# Patient Record
Sex: MALE | Race: WHITE | NOT HISPANIC OR LATINO | Employment: OTHER | URBAN - METROPOLITAN AREA
[De-identification: names, ages, dates, MRNs, and addresses within clinical notes are randomized per-mention and may not be internally consistent; named-entity substitution may affect disease eponyms.]

---

## 2023-10-02 ENCOUNTER — HOSPITAL ENCOUNTER (INPATIENT)
Facility: HOSPITAL | Age: 73
LOS: 11 days | Discharge: NURSING, REHAB OR LONG-TERM CARE | DRG: 305 | End: 2023-10-13
Attending: EMERGENCY MEDICINE | Admitting: INTERNAL MEDICINE
Payer: MEDICARE

## 2023-10-02 ENCOUNTER — APPOINTMENT (OUTPATIENT)
Dept: RADIOLOGY | Facility: HOSPITAL | Age: 73
DRG: 305 | End: 2023-10-02
Payer: MEDICARE

## 2023-10-02 DIAGNOSIS — Z95.0 PACEMAKER: ICD-10-CM

## 2023-10-02 DIAGNOSIS — G91.9 HYDROCEPHALUS IN ADULT (MULTI): ICD-10-CM

## 2023-10-02 DIAGNOSIS — I43 CARDIOMYOPATHY AS MANIFESTATION OF UNDERLYING DISEASE (MULTI): ICD-10-CM

## 2023-10-02 DIAGNOSIS — I10 HYPERTENSION, UNSPECIFIED TYPE: ICD-10-CM

## 2023-10-02 DIAGNOSIS — I11.9 HYPERTENSIVE HEART DISEASE WITHOUT HEART FAILURE: ICD-10-CM

## 2023-10-02 DIAGNOSIS — I16.1 HYPERTENSIVE EMERGENCY: Primary | ICD-10-CM

## 2023-10-02 DIAGNOSIS — R55 SYNCOPE, UNSPECIFIED SYNCOPE TYPE: ICD-10-CM

## 2023-10-02 DIAGNOSIS — E11.65 TYPE 2 DIABETES MELLITUS WITH HYPERGLYCEMIA, WITHOUT LONG-TERM CURRENT USE OF INSULIN (MULTI): ICD-10-CM

## 2023-10-02 DIAGNOSIS — I49.5 TACHYCARDIA-BRADYCARDIA (MULTI): ICD-10-CM

## 2023-10-02 DIAGNOSIS — I21.A1 TYPE 2 MI (MYOCARDIAL INFARCTION) (MULTI): ICD-10-CM

## 2023-10-02 DIAGNOSIS — G91.2 NPH (NORMAL PRESSURE HYDROCEPHALUS) (MULTI): ICD-10-CM

## 2023-10-02 LAB
ALBUMIN SERPL BCP-MCNC: 3.9 G/DL (ref 3.4–5)
ALBUMIN SERPL BCP-MCNC: 4.2 G/DL (ref 3.4–5)
ALP SERPL-CCNC: 66 U/L (ref 33–136)
ALP SERPL-CCNC: 67 U/L (ref 33–136)
ALT SERPL W P-5'-P-CCNC: 16 U/L (ref 10–52)
ALT SERPL W P-5'-P-CCNC: 18 U/L (ref 10–52)
AMMONIA PLAS-SCNC: 30 UMOL/L (ref 16–53)
ANION GAP BLDV CALCULATED.4IONS-SCNC: 12 MMOL/L (ref 10–25)
ANION GAP BLDV CALCULATED.4IONS-SCNC: 13 MMOL/L (ref 10–25)
ANION GAP SERPL CALC-SCNC: 20 MMOL/L (ref 10–20)
ANION GAP SERPL CALC-SCNC: 20 MMOL/L (ref 10–20)
APPEARANCE UR: ABNORMAL
APTT PPP: 29 SECONDS (ref 27–38)
AST SERPL W P-5'-P-CCNC: 24 U/L (ref 9–39)
AST SERPL W P-5'-P-CCNC: 27 U/L (ref 9–39)
BASE EXCESS BLDV CALC-SCNC: 2 MMOL/L (ref -2–3)
BASE EXCESS BLDV CALC-SCNC: 5.2 MMOL/L (ref -2–3)
BASOPHILS # BLD AUTO: 0.02 X10*3/UL (ref 0–0.1)
BASOPHILS # BLD AUTO: 0.04 X10*3/UL (ref 0–0.1)
BASOPHILS NFR BLD AUTO: 0.1 %
BASOPHILS NFR BLD AUTO: 0.3 %
BILIRUB SERPL-MCNC: 1.5 MG/DL (ref 0–1.2)
BILIRUB SERPL-MCNC: 1.7 MG/DL (ref 0–1.2)
BILIRUB UR STRIP.AUTO-MCNC: NEGATIVE MG/DL
BNP SERPL-MCNC: 229 PG/ML (ref 0–99)
BNP SERPL-MCNC: 244 PG/ML (ref 0–99)
BODY TEMPERATURE: 37 DEGREES CELSIUS
BODY TEMPERATURE: 37 DEGREES CELSIUS
BUN SERPL-MCNC: 20 MG/DL (ref 6–23)
BUN SERPL-MCNC: 23 MG/DL (ref 6–23)
CA-I BLD-SCNC: 1.12 MMOL/L (ref 1.1–1.33)
CA-I BLDV-SCNC: 1.21 MMOL/L (ref 1.1–1.33)
CA-I BLDV-SCNC: 1.22 MMOL/L (ref 1.1–1.33)
CALCIUM SERPL-MCNC: 10.1 MG/DL (ref 8.6–10.6)
CALCIUM SERPL-MCNC: 9.7 MG/DL (ref 8.6–10.6)
CARDIAC TROPONIN I PNL SERPL HS: 149 NG/L (ref 0–53)
CARDIAC TROPONIN I PNL SERPL HS: 151 NG/L (ref 0–53)
CARDIAC TROPONIN I PNL SERPL HS: 153 NG/L (ref 0–53)
CHLORIDE BLDV-SCNC: 100 MMOL/L (ref 98–107)
CHLORIDE BLDV-SCNC: 99 MMOL/L (ref 98–107)
CHLORIDE SERPL-SCNC: 97 MMOL/L (ref 98–107)
CHLORIDE SERPL-SCNC: 98 MMOL/L (ref 98–107)
CK SERPL-CCNC: 396 U/L (ref 0–325)
CO2 SERPL-SCNC: 25 MMOL/L (ref 21–32)
CO2 SERPL-SCNC: 26 MMOL/L (ref 21–32)
COLOR UR: YELLOW
CREAT SERPL-MCNC: 0.98 MG/DL (ref 0.5–1.3)
CREAT SERPL-MCNC: 1.04 MG/DL (ref 0.5–1.3)
EOSINOPHIL # BLD AUTO: 0.01 X10*3/UL (ref 0–0.4)
EOSINOPHIL # BLD AUTO: 0.06 X10*3/UL (ref 0–0.4)
EOSINOPHIL NFR BLD AUTO: 0.1 %
EOSINOPHIL NFR BLD AUTO: 0.4 %
ERYTHROCYTE [DISTWIDTH] IN BLOOD BY AUTOMATED COUNT: 12.6 % (ref 11.5–14.5)
ERYTHROCYTE [DISTWIDTH] IN BLOOD BY AUTOMATED COUNT: 12.7 % (ref 11.5–14.5)
GFR SERPL CREATININE-BSD FRML MDRD: 76 ML/MIN/1.73M*2
GFR SERPL CREATININE-BSD FRML MDRD: 81 ML/MIN/1.73M*2
GLUCOSE BLD MANUAL STRIP-MCNC: 304 MG/DL (ref 74–99)
GLUCOSE BLD MANUAL STRIP-MCNC: 313 MG/DL (ref 74–99)
GLUCOSE BLD MANUAL STRIP-MCNC: 321 MG/DL (ref 74–99)
GLUCOSE BLDV-MCNC: 355 MG/DL (ref 74–99)
GLUCOSE BLDV-MCNC: 420 MG/DL (ref 74–99)
GLUCOSE SERPL-MCNC: 311 MG/DL (ref 74–99)
GLUCOSE SERPL-MCNC: 359 MG/DL (ref 74–99)
GLUCOSE UR STRIP.AUTO-MCNC: ABNORMAL MG/DL
HCO3 BLDV-SCNC: 26.5 MMOL/L (ref 22–26)
HCO3 BLDV-SCNC: 27.8 MMOL/L (ref 22–26)
HCT VFR BLD AUTO: 46.8 % (ref 41–52)
HCT VFR BLD AUTO: 50.7 % (ref 41–52)
HCT VFR BLD EST: 54 % (ref 41–52)
HCT VFR BLD EST: 55 % (ref 41–52)
HGB BLD-MCNC: 17.5 G/DL (ref 13.5–17.5)
HGB BLD-MCNC: 17.8 G/DL (ref 13.5–17.5)
HGB BLDV-MCNC: 18.1 G/DL (ref 13.5–17.5)
HGB BLDV-MCNC: 18.4 G/DL (ref 13.5–17.5)
IMM GRANULOCYTES # BLD AUTO: 0.08 X10*3/UL (ref 0–0.5)
IMM GRANULOCYTES # BLD AUTO: 0.08 X10*3/UL (ref 0–0.5)
IMM GRANULOCYTES NFR BLD AUTO: 0.5 % (ref 0–0.9)
IMM GRANULOCYTES NFR BLD AUTO: 0.5 % (ref 0–0.9)
INR PPP: 1 (ref 0.9–1.1)
KETONES UR STRIP.AUTO-MCNC: ABNORMAL MG/DL
LACTATE BLDV-SCNC: 2.2 MMOL/L (ref 0.4–2)
LACTATE BLDV-SCNC: 2.3 MMOL/L (ref 0.4–2)
LACTATE SERPL-SCNC: 1.3 MMOL/L (ref 0.4–2)
LEUKOCYTE ESTERASE UR QL STRIP.AUTO: NEGATIVE
LYMPHOCYTES # BLD AUTO: 0.79 X10*3/UL (ref 0.8–3)
LYMPHOCYTES # BLD AUTO: 1.67 X10*3/UL (ref 0.8–3)
LYMPHOCYTES NFR BLD AUTO: 11.1 %
LYMPHOCYTES NFR BLD AUTO: 5.3 %
MAGNESIUM SERPL-MCNC: 1.85 MG/DL (ref 1.6–2.4)
MCH RBC QN AUTO: 32.1 PG (ref 26–34)
MCH RBC QN AUTO: 33.7 PG (ref 26–34)
MCHC RBC AUTO-ENTMCNC: 35.1 G/DL (ref 32–36)
MCHC RBC AUTO-ENTMCNC: 37.4 G/DL (ref 32–36)
MCV RBC AUTO: 90 FL (ref 80–100)
MCV RBC AUTO: 91 FL (ref 80–100)
MONOCYTES # BLD AUTO: 1.07 X10*3/UL (ref 0.05–0.8)
MONOCYTES # BLD AUTO: 1.47 X10*3/UL (ref 0.05–0.8)
MONOCYTES NFR BLD AUTO: 7.1 %
MONOCYTES NFR BLD AUTO: 9.8 %
MUCOUS THREADS #/AREA URNS AUTO: ABNORMAL /LPF
NEUTROPHILS # BLD AUTO: 11.7 X10*3/UL (ref 1.6–5.5)
NEUTROPHILS # BLD AUTO: 13 X10*3/UL (ref 1.6–5.5)
NEUTROPHILS NFR BLD AUTO: 77.9 %
NEUTROPHILS NFR BLD AUTO: 86.9 %
NITRITE UR QL STRIP.AUTO: NEGATIVE
NRBC BLD-RTO: 0 /100 WBCS (ref 0–0)
NRBC BLD-RTO: 0 /100 WBCS (ref 0–0)
OXYHGB MFR BLDV: 76.8 % (ref 45–75)
OXYHGB MFR BLDV: 85.2 % (ref 45–75)
PCO2 BLDV: 34 MM HG (ref 41–51)
PCO2 BLDV: 40 MM HG (ref 41–51)
PH BLDV: 7.43 PH (ref 7.33–7.43)
PH BLDV: 7.52 PH (ref 7.33–7.43)
PH UR STRIP.AUTO: 5 [PH]
PHOSPHATE SERPL-MCNC: 3.1 MG/DL (ref 2.5–4.9)
PLATELET # BLD AUTO: 207 X10*3/UL (ref 150–450)
PLATELET # BLD AUTO: 227 X10*3/UL (ref 150–450)
PMV BLD AUTO: 11 FL (ref 7.5–11.5)
PMV BLD AUTO: 11.3 FL (ref 7.5–11.5)
PO2 BLDV: 52 MM HG (ref 35–45)
PO2 BLDV: 54 MM HG (ref 35–45)
POTASSIUM BLDV-SCNC: 3.5 MMOL/L (ref 3.5–5.3)
POTASSIUM BLDV-SCNC: 4 MMOL/L (ref 3.5–5.3)
POTASSIUM SERPL-SCNC: 3.4 MMOL/L (ref 3.5–5.3)
POTASSIUM SERPL-SCNC: 3.8 MMOL/L (ref 3.5–5.3)
PROT SERPL-MCNC: 6.7 G/DL (ref 6.4–8.2)
PROT SERPL-MCNC: 7.6 G/DL (ref 6.4–8.2)
PROT UR STRIP.AUTO-MCNC: ABNORMAL MG/DL
PROTHROMBIN TIME: 11.5 SECONDS (ref 9.8–12.8)
RBC # BLD AUTO: 5.19 X10*6/UL (ref 4.5–5.9)
RBC # BLD AUTO: 5.55 X10*6/UL (ref 4.5–5.9)
RBC # UR STRIP.AUTO: NEGATIVE /UL
RBC #/AREA URNS AUTO: ABNORMAL /HPF
SAO2 % BLDV: 79 % (ref 45–75)
SAO2 % BLDV: 88 % (ref 45–75)
SODIUM BLDV-SCNC: 134 MMOL/L (ref 136–145)
SODIUM BLDV-SCNC: 136 MMOL/L (ref 136–145)
SODIUM SERPL-SCNC: 139 MMOL/L (ref 136–145)
SODIUM SERPL-SCNC: 140 MMOL/L (ref 136–145)
SP GR UR STRIP.AUTO: 1.03
SQUAMOUS #/AREA URNS AUTO: ABNORMAL /HPF
UROBILINOGEN UR STRIP.AUTO-MCNC: <2 MG/DL
WBC # BLD AUTO: 15 X10*3/UL (ref 4.4–11.3)
WBC # BLD AUTO: 15 X10*3/UL (ref 4.4–11.3)
WBC #/AREA URNS AUTO: ABNORMAL /HPF

## 2023-10-02 PROCEDURE — 96372 THER/PROPH/DIAG INJ SC/IM: CPT

## 2023-10-02 PROCEDURE — 82330 ASSAY OF CALCIUM: CPT

## 2023-10-02 PROCEDURE — 36415 COLL VENOUS BLD VENIPUNCTURE: CPT

## 2023-10-02 PROCEDURE — 82947 ASSAY GLUCOSE BLOOD QUANT: CPT

## 2023-10-02 PROCEDURE — 99285 EMERGENCY DEPT VISIT HI MDM: CPT | Performed by: EMERGENCY MEDICINE

## 2023-10-02 PROCEDURE — 2500000004 HC RX 250 GENERAL PHARMACY W/ HCPCS (ALT 636 FOR OP/ED)

## 2023-10-02 PROCEDURE — 83880 ASSAY OF NATRIURETIC PEPTIDE: CPT | Performed by: EMERGENCY MEDICINE

## 2023-10-02 PROCEDURE — 84484 ASSAY OF TROPONIN QUANT: CPT | Performed by: EMERGENCY MEDICINE

## 2023-10-02 PROCEDURE — G1004 CDSM NDSC: HCPCS

## 2023-10-02 PROCEDURE — 83735 ASSAY OF MAGNESIUM: CPT

## 2023-10-02 PROCEDURE — 70450 CT HEAD/BRAIN W/O DYE: CPT | Performed by: RADIOLOGY

## 2023-10-02 PROCEDURE — 82550 ASSAY OF CK (CPK): CPT | Performed by: EMERGENCY MEDICINE

## 2023-10-02 PROCEDURE — 84100 ASSAY OF PHOSPHORUS: CPT

## 2023-10-02 PROCEDURE — 84484 ASSAY OF TROPONIN QUANT: CPT

## 2023-10-02 PROCEDURE — 80307 DRUG TEST PRSMV CHEM ANLYZR: CPT

## 2023-10-02 PROCEDURE — 83605 ASSAY OF LACTIC ACID: CPT

## 2023-10-02 PROCEDURE — 82140 ASSAY OF AMMONIA: CPT | Performed by: EMERGENCY MEDICINE

## 2023-10-02 PROCEDURE — 99291 CRITICAL CARE FIRST HOUR: CPT

## 2023-10-02 PROCEDURE — 84439 ASSAY OF FREE THYROXINE: CPT

## 2023-10-02 PROCEDURE — 84443 ASSAY THYROID STIM HORMONE: CPT

## 2023-10-02 PROCEDURE — 85730 THROMBOPLASTIN TIME PARTIAL: CPT | Performed by: STUDENT IN AN ORGANIZED HEALTH CARE EDUCATION/TRAINING PROGRAM

## 2023-10-02 PROCEDURE — 1100000001 HC PRIVATE ROOM DAILY

## 2023-10-02 PROCEDURE — 2500000004 HC RX 250 GENERAL PHARMACY W/ HCPCS (ALT 636 FOR OP/ED): Performed by: EMERGENCY MEDICINE

## 2023-10-02 PROCEDURE — 85025 COMPLETE CBC W/AUTO DIFF WBC: CPT

## 2023-10-02 PROCEDURE — 2500000002 HC RX 250 W HCPCS SELF ADMINISTERED DRUGS (ALT 637 FOR MEDICARE OP, ALT 636 FOR OP/ED)

## 2023-10-02 PROCEDURE — 84484 ASSAY OF TROPONIN QUANT: CPT | Performed by: STUDENT IN AN ORGANIZED HEALTH CARE EDUCATION/TRAINING PROGRAM

## 2023-10-02 PROCEDURE — 80053 COMPREHEN METABOLIC PANEL: CPT | Performed by: EMERGENCY MEDICINE

## 2023-10-02 PROCEDURE — 83605 ASSAY OF LACTIC ACID: CPT | Performed by: STUDENT IN AN ORGANIZED HEALTH CARE EDUCATION/TRAINING PROGRAM

## 2023-10-02 PROCEDURE — 2580000001 HC RX 258 IV SOLUTIONS

## 2023-10-02 PROCEDURE — 80307 DRUG TEST PRSMV CHEM ANLYZR: CPT | Performed by: EMERGENCY MEDICINE

## 2023-10-02 PROCEDURE — 96374 THER/PROPH/DIAG INJ IV PUSH: CPT

## 2023-10-02 PROCEDURE — 81001 URINALYSIS AUTO W/SCOPE: CPT | Performed by: EMERGENCY MEDICINE

## 2023-10-02 PROCEDURE — 36415 COLL VENOUS BLD VENIPUNCTURE: CPT | Performed by: EMERGENCY MEDICINE

## 2023-10-02 PROCEDURE — 71045 X-RAY EXAM CHEST 1 VIEW: CPT | Mod: FOREIGN READ | Performed by: RADIOLOGY

## 2023-10-02 PROCEDURE — 72125 CT NECK SPINE W/O DYE: CPT | Mod: ME

## 2023-10-02 PROCEDURE — 85018 HEMOGLOBIN: CPT | Performed by: EMERGENCY MEDICINE

## 2023-10-02 PROCEDURE — 87086 URINE CULTURE/COLONY COUNT: CPT | Performed by: EMERGENCY MEDICINE

## 2023-10-02 PROCEDURE — 72125 CT NECK SPINE W/O DYE: CPT | Performed by: RADIOLOGY

## 2023-10-02 PROCEDURE — 83880 ASSAY OF NATRIURETIC PEPTIDE: CPT

## 2023-10-02 PROCEDURE — 71045 X-RAY EXAM CHEST 1 VIEW: CPT | Mod: FY,FR

## 2023-10-02 PROCEDURE — 85025 COMPLETE CBC W/AUTO DIFF WBC: CPT | Performed by: EMERGENCY MEDICINE

## 2023-10-02 PROCEDURE — 96375 TX/PRO/DX INJ NEW DRUG ADDON: CPT

## 2023-10-02 PROCEDURE — 84075 ASSAY ALKALINE PHOSPHATASE: CPT | Performed by: EMERGENCY MEDICINE

## 2023-10-02 RX ORDER — SODIUM CHLORIDE, SODIUM LACTATE, POTASSIUM CHLORIDE, CALCIUM CHLORIDE 600; 310; 30; 20 MG/100ML; MG/100ML; MG/100ML; MG/100ML
500 INJECTION, SOLUTION INTRAVENOUS CONTINUOUS
Status: ACTIVE | OUTPATIENT
Start: 2023-10-02 | End: 2023-10-03

## 2023-10-02 RX ORDER — POTASSIUM CHLORIDE 14.9 MG/ML
20 INJECTION INTRAVENOUS
Status: DISCONTINUED | OUTPATIENT
Start: 2023-10-02 | End: 2023-10-02

## 2023-10-02 RX ORDER — INSULIN GLARGINE 100 [IU]/ML
10 INJECTION, SOLUTION SUBCUTANEOUS NIGHTLY
Status: DISCONTINUED | OUTPATIENT
Start: 2023-10-02 | End: 2023-10-04

## 2023-10-02 RX ORDER — MAGNESIUM SULFATE HEPTAHYDRATE 40 MG/ML
2 INJECTION, SOLUTION INTRAVENOUS ONCE
Status: DISCONTINUED | OUTPATIENT
Start: 2023-10-02 | End: 2023-10-02

## 2023-10-02 RX ORDER — HYDRALAZINE HYDROCHLORIDE 20 MG/ML
10 INJECTION INTRAMUSCULAR; INTRAVENOUS ONCE
Status: COMPLETED | OUTPATIENT
Start: 2023-10-02 | End: 2023-10-02

## 2023-10-02 RX ORDER — DEXTROSE MONOHYDRATE 100 MG/ML
0.3 INJECTION, SOLUTION INTRAVENOUS ONCE AS NEEDED
Status: DISCONTINUED | OUTPATIENT
Start: 2023-10-02 | End: 2023-10-13 | Stop reason: HOSPADM

## 2023-10-02 RX ORDER — POTASSIUM CHLORIDE 14.9 MG/ML
20 INJECTION INTRAVENOUS
Status: COMPLETED | OUTPATIENT
Start: 2023-10-02 | End: 2023-10-03

## 2023-10-02 RX ORDER — DEXTROSE 50 % IN WATER (D50W) INTRAVENOUS SYRINGE
25
Status: DISCONTINUED | OUTPATIENT
Start: 2023-10-02 | End: 2023-10-13 | Stop reason: HOSPADM

## 2023-10-02 RX ORDER — NICARDIPINE HYDROCHLORIDE 0.2 MG/ML
2.5-15 INJECTION INTRAVENOUS CONTINUOUS
Status: DISCONTINUED | OUTPATIENT
Start: 2023-10-02 | End: 2023-10-03

## 2023-10-02 RX ORDER — MAGNESIUM SULFATE HEPTAHYDRATE 40 MG/ML
2 INJECTION, SOLUTION INTRAVENOUS ONCE
Status: COMPLETED | OUTPATIENT
Start: 2023-10-02 | End: 2023-10-02

## 2023-10-02 RX ORDER — POTASSIUM CHLORIDE 14.9 MG/ML
INJECTION INTRAVENOUS
Status: COMPLETED
Start: 2023-10-02 | End: 2023-10-02

## 2023-10-02 RX ORDER — NICARDIPINE HYDROCHLORIDE 0.2 MG/ML
INJECTION INTRAVENOUS
Status: COMPLETED
Start: 2023-10-02 | End: 2023-10-02

## 2023-10-02 RX ORDER — INSULIN LISPRO 100 [IU]/ML
0-10 INJECTION, SOLUTION INTRAVENOUS; SUBCUTANEOUS
Status: DISCONTINUED | OUTPATIENT
Start: 2023-10-02 | End: 2023-10-13 | Stop reason: HOSPADM

## 2023-10-02 RX ORDER — INSULIN LISPRO 100 [IU]/ML
0-10 INJECTION, SOLUTION INTRAVENOUS; SUBCUTANEOUS
Status: DISCONTINUED | OUTPATIENT
Start: 2023-10-03 | End: 2023-10-02

## 2023-10-02 RX ORDER — NAPROXEN SODIUM 220 MG/1
325 TABLET, FILM COATED ORAL ONCE
Status: DISCONTINUED | OUTPATIENT
Start: 2023-10-02 | End: 2023-10-02

## 2023-10-02 RX ORDER — DEXTROSE MONOHYDRATE 100 MG/ML
0.3 INJECTION, SOLUTION INTRAVENOUS ONCE AS NEEDED
Status: DISCONTINUED | OUTPATIENT
Start: 2023-10-02 | End: 2023-10-02

## 2023-10-02 RX ORDER — NAPROXEN SODIUM 220 MG/1
324 TABLET, FILM COATED ORAL ONCE
Status: DISCONTINUED | OUTPATIENT
Start: 2023-10-02 | End: 2023-10-02

## 2023-10-02 RX ADMIN — HYDRALAZINE HYDROCHLORIDE 10 MG: 20 INJECTION INTRAMUSCULAR; INTRAVENOUS at 10:48

## 2023-10-02 RX ADMIN — NICARDIPINE HYDROCHLORIDE 2.5 MG/HR: 0.2 INJECTION, SOLUTION INTRAVENOUS at 12:09

## 2023-10-02 RX ADMIN — INSULIN LISPRO 8 UNITS: 100 INJECTION, SOLUTION INTRAVENOUS; SUBCUTANEOUS at 22:06

## 2023-10-02 RX ADMIN — POTASSIUM CHLORIDE 20 MEQ: 14.9 INJECTION, SOLUTION INTRAVENOUS at 22:08

## 2023-10-02 RX ADMIN — NICARDIPINE HYDROCHLORIDE 2.58 MG/HR: 0.2 INJECTION, SOLUTION INTRAVENOUS at 22:07

## 2023-10-02 RX ADMIN — MAGNESIUM SULFATE HEPTAHYDRATE 2 G: 40 INJECTION, SOLUTION INTRAVENOUS at 22:07

## 2023-10-02 RX ADMIN — INSULIN HUMAN 10 UNITS: 100 INJECTION, SOLUTION PARENTERAL at 12:00

## 2023-10-02 RX ADMIN — SODIUM CHLORIDE, POTASSIUM CHLORIDE, SODIUM LACTATE AND CALCIUM CHLORIDE 500 ML/HR: 600; 310; 30; 20 INJECTION, SOLUTION INTRAVENOUS at 23:17

## 2023-10-02 RX ADMIN — INSULIN GLARGINE 10 UNITS: 100 INJECTION, SOLUTION SUBCUTANEOUS at 22:05

## 2023-10-02 SDOH — SOCIAL STABILITY: SOCIAL INSECURITY: ABUSE: ADULT

## 2023-10-02 SDOH — SOCIAL STABILITY: SOCIAL INSECURITY: ARE THERE ANY APPARENT SIGNS OF INJURIES/BEHAVIORS THAT COULD BE RELATED TO ABUSE/NEGLECT?: NO

## 2023-10-02 SDOH — SOCIAL STABILITY: SOCIAL INSECURITY: DO YOU FEEL UNSAFE GOING BACK TO THE PLACE WHERE YOU ARE LIVING?: NO

## 2023-10-02 SDOH — SOCIAL STABILITY: SOCIAL INSECURITY: DOES ANYONE TRY TO KEEP YOU FROM HAVING/CONTACTING OTHER FRIENDS OR DOING THINGS OUTSIDE YOUR HOME?: NO

## 2023-10-02 SDOH — SOCIAL STABILITY: SOCIAL INSECURITY: HAVE YOU HAD THOUGHTS OF HARMING ANYONE ELSE?: NO

## 2023-10-02 SDOH — SOCIAL STABILITY: SOCIAL INSECURITY: HAS ANYONE EVER THREATENED TO HURT YOUR FAMILY OR YOUR PETS?: NO

## 2023-10-02 SDOH — SOCIAL STABILITY: SOCIAL INSECURITY: ARE YOU OR HAVE YOU BEEN THREATENED OR ABUSED PHYSICALLY, EMOTIONALLY, OR SEXUALLY BY ANYONE?: NO

## 2023-10-02 SDOH — SOCIAL STABILITY: SOCIAL INSECURITY: DO YOU FEEL ANYONE HAS EXPLOITED OR TAKEN ADVANTAGE OF YOU FINANCIALLY OR OF YOUR PERSONAL PROPERTY?: NO

## 2023-10-02 ASSESSMENT — LIFESTYLE VARIABLES
HOW OFTEN DO YOU HAVE 6 OR MORE DRINKS ON ONE OCCASION: NEVER
SKIP TO QUESTIONS 9-10: 1
HOW OFTEN DO YOU HAVE A DRINK CONTAINING ALCOHOL: 2-4 TIMES A MONTH
HOW MANY STANDARD DRINKS CONTAINING ALCOHOL DO YOU HAVE ON A TYPICAL DAY: 1 OR 2
AUDIT-C TOTAL SCORE: 2
HAVE YOU EVER FELT YOU SHOULD CUT DOWN ON YOUR DRINKING: NO
HAVE PEOPLE ANNOYED YOU BY CRITICIZING YOUR DRINKING: NO
EVER FELT BAD OR GUILTY ABOUT YOUR DRINKING: NO
AUDIT-C TOTAL SCORE: 2
EVER HAD A DRINK FIRST THING IN THE MORNING TO STEADY YOUR NERVES TO GET RID OF A HANGOVER: NO

## 2023-10-02 ASSESSMENT — ENCOUNTER SYMPTOMS
WEAKNESS: 1
FALLS: 1
CHILLS: 0
NEAR-SYNCOPE: 1
EYE DISCHARGE: 0
ORTHOPNEA: 0
CONSTIPATION: 0
FEVER: 0
DIARRHEA: 0
EYE REDNESS: 1
NAUSEA: 0
LOSS OF BALANCE: 1
JOINT SWELLING: 0
HEADACHES: 0
ABDOMINAL PAIN: 0
FREQUENCY: 0
EYE PAIN: 0
PALPITATIONS: 0
SYNCOPE: 1
VOMITING: 0
LIGHT-HEADEDNESS: 1
SEIZURES: 0
PND: 0
NUMBNESS: 0
DYSPNEA ON EXERTION: 0
DIZZINESS: 1
DYSURIA: 0
FOCAL WEAKNESS: 0

## 2023-10-02 ASSESSMENT — ACTIVITIES OF DAILY LIVING (ADL)
GROOMING: NEEDS ASSISTANCE
DRESSING YOURSELF: NEEDS ASSISTANCE
HEARING - RIGHT EAR: FUNCTIONAL
HEARING - LEFT EAR: FUNCTIONAL
BATHING: NEEDS ASSISTANCE
JUDGMENT_ADEQUATE_SAFELY_COMPLETE_DAILY_ACTIVITIES: YES
PATIENT'S MEMORY ADEQUATE TO SAFELY COMPLETE DAILY ACTIVITIES?: YES
WALKS IN HOME: INDEPENDENT
ADEQUATE_TO_COMPLETE_ADL: YES
TOILETING: INDEPENDENT
FEEDING YOURSELF: INDEPENDENT

## 2023-10-02 ASSESSMENT — COGNITIVE AND FUNCTIONAL STATUS - GENERAL
MOBILITY SCORE: 20
CLIMB 3 TO 5 STEPS WITH RAILING: A LITTLE
DRESSING REGULAR UPPER BODY CLOTHING: A LITTLE
MOVING TO AND FROM BED TO CHAIR: A LITTLE
PERSONAL GROOMING: A LITTLE
TOILETING: A LITTLE
HELP NEEDED FOR BATHING: A LITTLE
PATIENT BASELINE BEDBOUND: NO
STANDING UP FROM CHAIR USING ARMS: A LITTLE
DAILY ACTIVITIY SCORE: 19
EATING MEALS: A LITTLE
WALKING IN HOSPITAL ROOM: A LITTLE

## 2023-10-02 ASSESSMENT — PAIN - FUNCTIONAL ASSESSMENT
PAIN_FUNCTIONAL_ASSESSMENT: 0-10
PAIN_FUNCTIONAL_ASSESSMENT: 0-10

## 2023-10-02 ASSESSMENT — PAIN SCALES - GENERAL
PAINLEVEL_OUTOF10: 0 - NO PAIN
PAINLEVEL_OUTOF10: 0 - NO PAIN

## 2023-10-02 ASSESSMENT — COLUMBIA-SUICIDE SEVERITY RATING SCALE - C-SSRS
1. IN THE PAST MONTH, HAVE YOU WISHED YOU WERE DEAD OR WISHED YOU COULD GO TO SLEEP AND NOT WAKE UP?: NO
6. HAVE YOU EVER DONE ANYTHING, STARTED TO DO ANYTHING, OR PREPARED TO DO ANYTHING TO END YOUR LIFE?: NO
2. HAVE YOU ACTUALLY HAD ANY THOUGHTS OF KILLING YOURSELF?: NO

## 2023-10-02 ASSESSMENT — PATIENT HEALTH QUESTIONNAIRE - PHQ9
2. FEELING DOWN, DEPRESSED OR HOPELESS: NOT AT ALL
1. LITTLE INTEREST OR PLEASURE IN DOING THINGS: NOT AT ALL
SUM OF ALL RESPONSES TO PHQ9 QUESTIONS 1 & 2: 0

## 2023-10-02 NOTE — H&P
History Of Present Illness  Miguel Angel Santos is a 73 y.o. male presenting with PMHx of HTN, DMII per self-report, who presents for a syncopal event. Hx was taken from the patient with difficulties as he is having trouble with thought process and replying to questions however he was doing well when asked yes/no questions. The patient reported that he fell yesterday, lost consciousness, unsure for how long, and unsure why, he reported that he can't recall the whole incident but he denies tripping on something and he reported just blacking out. He denies any chest pain, sob, cough, fever, chills, n/v/d/c, abdominal pain and rest of ROS was unremarkable.   He reported he follows at [facility] and has some medication which he has been having trouble taking them.  Per ED note, it was noted that he was found down in his apartment by his daughter which noticed confused speech, also per report EMS found him naked on the ground in his urine (unclear if today or yesterday as he reported similar episode of falling down yesterday)   Per ED report Additionally reported he has had 1 to 2 weeks of worsening personality changes, shuffling gait, and multiple episodes of incontinence.         Met family at bedside, they reported that for the past 3 days they have been trying to contact him with no response, no one is aware of the mechanism of the fall, they also reported that for the past few week to months he has not been him self, recently having recurrent falls, unable to have full conversations and interactions as before, along with urinary incontinence.     They are unsure of how much he follows or takes his medications but have doubts on that, they are not sure if he takes insulin but confirmed the dx of HTN and DM with no knowledge of other diseases.     The reported FH of Stroke in both parents of the patient. It was noted during the conversation with his daughters that the patient was more attentive and more engaging and the daughter  agreed on this status of waxing and waning.      SH: He lives alone, denies smoking, alcohol or illicit drug use   FH: Could not obtain  PMHX: as mentioned   PSHx: PPM in 2018     ED course:   Was found to be hypertensive 200s/100s, 204/142  WY 99, RR 16, T 36.7, Sat 94% on RA   CBC: WBC 15, Hgb 17.8,    Chem: Glucose 359, K 3.8, Chloride 97, , Bicarb 26, Cr 0.98, BUN 20   LFTs within normal limits   Ammonia 30   Trop 149   BNp 244      BG: Ph 7.52, Pco2 34, Bicarb 27.8   EKG per ED documentation   EKG: Rate 98, regular rhythm, WY interval less than 250 MS, QRS prolonged at 180,  I calculated his QTc interval as 511 ms.  Atrial sensed V paced rhythm rhythm present.  Sgarbossa criteria negative for STEMI  as he did not have excessively discordant ST elevation in his precordial leads.     CXR:   IMPRESSION:  Right basilar subsegmental atelectasis.    CTH and CTS:   IMPRESSION:  Moderate dilatation of the lateral and 3rd ventricles with relative  effacement of the subarachnoid space over the vertex suggesting the  possibility of adult hydrocephalus. Please correlate clinically.      Minimal focal hypodensity in the left corona radiata and right  lateral thalamus likely due to ischemia of uncertain chronicity. If  recent ischemia were clinically suspected CT angiography and or MRI  could be considered for added sensitivity and specificity in this  regard.      No acute fracture or other acute abnormality of the cervical vertebral bodies was identified with degenerative changes as described above.    Started on cardene drip, and  NSG consulted with no surgical intervention for now.     Past Medical History  He has a past medical history of Diabetes mellitus (CMS/HCC).    Surgical History  He has a past surgical history that includes Cardiac surgery.     Social History  He has no history on file for tobacco use, alcohol use, and drug use.    Family History  No family history on file.    "  Allergies  Patient has no known allergies.       Review of Systems   Constitutional: Negative for chills and fever.   HENT:  Negative for ear discharge and ear pain.    Eyes:  Positive for redness. Negative for discharge and pain.   Cardiovascular:  Positive for near-syncope and syncope. Negative for chest pain, dyspnea on exertion, orthopnea, palpitations and paroxysmal nocturnal dyspnea.   Musculoskeletal:  Positive for falls. Negative for joint pain and joint swelling.   Gastrointestinal:  Negative for abdominal pain, constipation, diarrhea, melena, nausea and vomiting.   Genitourinary:  Positive for bladder incontinence. Negative for dysuria and frequency.   Neurological:  Positive for dizziness, light-headedness, loss of balance and weakness. Negative for focal weakness, headaches, numbness and seizures.       Physical Exam  BP (!) 167/106   Pulse 106   Temp 36.7 °C (98.1 °F) (Temporal)   Resp 16   Ht 1.93 m (6' 4\")   Wt 99.8 kg (220 lb)   SpO2 96%   BMI 26.78 kg/m²     Constitutional: NAD, lying in bed, appears comfortable, Alert to year, person and place but not to month (takes a lot of time to answer questions)   Eyes: EOMI, clear sclera  ENMT: moist mucous membranes  Head/Neck: no appreciable JVD  Respiratory/Thorax: CTAB, adequate air movement, no increased WOB  Cardiovascular: +S1, +S2, RRR, no m/r/g  Gastrointestinal: soft, NT, ND, +BS, no appreciable HSM  Extremities: No asymmetry   Neurological: AOx2-3, pleasant, conversational, following commands, no gross focal neuro deficits  Skin: warm and dry       Last Recorded Vitals  Blood pressure (!) 167/106, pulse 106, temperature 36.7 °C (98.1 °F), temperature source Temporal, resp. rate 16, height 1.93 m (6' 4\"), weight 99.8 kg (220 lb), SpO2 96 %.    Relevant Results  Scheduled medications  aspirin, 324 mg, oral, Once  lactated Ringer's, 500 mL, intravenous, Once  magnesium sulfate, 2 g, intravenous, Once  magnesium sulfate, 2 g, intravenous, " Once  potassium chloride, 20 mEq, intravenous, q2h      Continuous medications  niCARdipine, 2.5-15 mg/hr, Last Rate: 2.5 mg/hr (10/02/23 1209)      PRN medications    Results for orders placed or performed during the hospital encounter of 10/02/23 (from the past 24 hour(s))   CBC and Auto Differential   Result Value Ref Range    WBC 15.0 (H) 4.4 - 11.3 x10*3/uL    nRBC 0.0 0.0 - 0.0 /100 WBCs    RBC 5.55 4.50 - 5.90 x10*6/uL    Hemoglobin 17.8 (H) 13.5 - 17.5 g/dL    Hematocrit 50.7 41.0 - 52.0 %    MCV 91 80 - 100 fL    MCH 32.1 26.0 - 34.0 pg    MCHC 35.1 32.0 - 36.0 g/dL    RDW 12.6 11.5 - 14.5 %    Platelets 207 150 - 450 x10*3/uL    MPV 11.3 7.5 - 11.5 fL    Neutrophils % 86.9 40.0 - 80.0 %    Immature Granulocytes %, Automated 0.5 0.0 - 0.9 %    Lymphocytes % 5.3 13.0 - 44.0 %    Monocytes % 7.1 2.0 - 10.0 %    Eosinophils % 0.1 0.0 - 6.0 %    Basophils % 0.1 0.0 - 2.0 %    Neutrophils Absolute 13.00 (H) 1.60 - 5.50 x10*3/uL    Immature Granulocytes Absolute, Automated 0.08 0.00 - 0.50 x10*3/uL    Lymphocytes Absolute 0.79 (L) 0.80 - 3.00 x10*3/uL    Monocytes Absolute 1.07 (H) 0.05 - 0.80 x10*3/uL    Eosinophils Absolute 0.01 0.00 - 0.40 x10*3/uL    Basophils Absolute 0.02 0.00 - 0.10 x10*3/uL   Comprehensive metabolic panel   Result Value Ref Range    Glucose 359 (H) 74 - 99 mg/dL    Sodium 139 136 - 145 mmol/L    Potassium 3.8 3.5 - 5.3 mmol/L    Chloride 97 (L) 98 - 107 mmol/L    Bicarbonate 26 21 - 32 mmol/L    Anion Gap 20 10 - 20 mmol/L    Urea Nitrogen 20 6 - 23 mg/dL    Creatinine 0.98 0.50 - 1.30 mg/dL    eGFR 81 >60 mL/min/1.73m*2    Calcium 10.1 8.6 - 10.6 mg/dL    Albumin 4.2 3.4 - 5.0 g/dL    Alkaline Phosphatase 66 33 - 136 U/L    Total Protein 7.6 6.4 - 8.2 g/dL    AST 27 9 - 39 U/L    Bilirubin, Total 1.5 (H) 0.0 - 1.2 mg/dL    ALT 18 10 - 52 U/L   Ammonia   Result Value Ref Range    Ammonia 30 16 - 53 umol/L   Troponin I, High Sensitivity   Result Value Ref Range    Troponin I, High  Sensitivity 149 (HH) 0 - 53 ng/L   B-Type Natriuretic Peptide   Result Value Ref Range     (H) 0 - 99 pg/mL   Creatine Kinase   Result Value Ref Range    Creatine Kinase 396 (H) 0 - 325 U/L   Blood Gas Venous Full Panel   Result Value Ref Range    POCT pH, Venous 7.43 7.33 - 7.43 pH    POCT pCO2, Venous 40 (L) 41 - 51 mm Hg    POCT pO2, Venous 52 (H) 35 - 45 mm Hg    POCT SO2, Venous 79 (H) 45 - 75 %    POCT Oxy Hemoglobin, Venous 76.8 (H) 45.0 - 75.0 %    POCT Hematocrit Calculated, Venous 55.0 (H) 41.0 - 52.0 %    POCT Sodium, Venous 134 (L) 136 - 145 mmol/L    POCT Potassium, Venous 4.0 3.5 - 5.3 mmol/L    POCT Chloride, Venous 99 98 - 107 mmol/L    POCT Ionized Calicum, Venous 1.21 1.10 - 1.33 mmol/L    POCT Glucose, Venous 420 (H) 74 - 99 mg/dL    POCT Lactate, Venous 2.3 (H) 0.4 - 2.0 mmol/L    POCT Base Excess, Venous 2.0 -2.0 - 3.0 mmol/L    POCT HCO3 Calculated, Venous 26.5 (H) 22.0 - 26.0 mmol/L    POCT Hemoglobin, Venous 18.4 (H) 13.5 - 17.5 g/dL    POCT Anion Gap, Venous 13.0 10.0 - 25.0 mmol/L    Patient Temperature 37.0 degrees Celsius   Blood Gas Venous Full Panel Unsolicited   Result Value Ref Range    POCT pH, Venous 7.52 (H) 7.33 - 7.43 pH    POCT pCO2, Venous 34 (L) 41 - 51 mm Hg    POCT pO2, Venous 54 (H) 35 - 45 mm Hg    POCT SO2, Venous 88 (H) 45 - 75 %    POCT Oxy Hemoglobin, Venous 85.2 (H) 45.0 - 75.0 %    POCT Hematocrit Calculated, Venous 54.0 (H) 41.0 - 52.0 %    POCT Sodium, Venous 136 136 - 145 mmol/L    POCT Potassium, Venous 3.5 3.5 - 5.3 mmol/L    POCT Chloride, Venous 100 98 - 107 mmol/L    POCT Ionized Calicum, Venous 1.22 1.10 - 1.33 mmol/L    POCT Glucose, Venous 355 (H) 74 - 99 mg/dL    POCT Lactate, Venous 2.2 (H) 0.4 - 2.0 mmol/L    POCT Base Excess, Venous 5.2 (H) -2.0 - 3.0 mmol/L    POCT HCO3 Calculated, Venous 27.8 (H) 22.0 - 26.0 mmol/L    POCT Hemoglobin, Venous 18.1 (H) 13.5 - 17.5 g/dL    POCT Anion Gap, Venous 12.0 10.0 - 25.0 mmol/L    Patient Temperature 37.0  degrees Celsius   POCT GLUCOSE   Result Value Ref Range    POCT Glucose 313 (H) 74 - 99 mg/dL   POCT GLUCOSE   Result Value Ref Range    POCT Glucose 321 (H) 74 - 99 mg/dL      CT head wo IV contrast    Result Date: 10/2/2023  Interpreted By:  Naseem Palmer, STUDY: CT HEAD WO IV CONTRAST; CT CERVICAL SPINE WO IV CONTRAST;  10/2/2023 12:41 pm   INDICATION: confusion ?PRESS?; Signs/Symptoms:unwitnessed fall.   COMPARISON: None.   ACCESSION NUMBER(S): [id number]; [id number]   ORDERING CLINICIAN: CORBIN YEPZE   TECHNIQUE: Axial noncontrast head CT   FINDINGS: Findings head CT: No acute intracranial hemorrhage mass effect or midline shift. Moderate dilatation of the lateral and 3rd ventricles with partial effacement of the subarachnoid space over the vertex. Nonspecific focal hypodensity in the left frontal corona radiata and within the lateral aspect of the right thalamus of uncertain chronicity. No territorial loss of gray-white distinction. No calvarial fracture. Atherosclerotic calcification of the cavernous internal carotid and bilateral V4 vertebral arteries is noted.   Findings cervical spine CT: No acute fracture or posttraumatic subluxation was identified. Disc osteophyte complexes indent the ventral thecal sac most prominently at C3-4 C4-5 C5-6 and C6-7 without high-grade central canal stenosis within the limits of CT.       Moderate dilatation of the lateral and 3rd ventricles with relative effacement of the subarachnoid space over the vertex suggesting the possibility of adult hydrocephalus. Please correlate clinically.   Minimal focal hypodensity in the left corona radiata and right lateral thalamus likely due to ischemia of uncertain chronicity. If recent ischemia were clinically suspected CT angiography and or MRI could be considered for added sensitivity and specificity in this regard.   No acute fracture or other acute abnormality of the cervical vertebral bodies was identified with degenerative changes  as described above.   Signed by: Naseem Palmer 10/2/2023 12:56 PM Dictation workstation:   [id number]    CT cervical spine wo IV contrast    Result Date: 10/2/2023  Interpreted By:  Naseem Palmer, STUDY: CT HEAD WO IV CONTRAST; CT CERVICAL SPINE WO IV CONTRAST;  10/2/2023 12:41 pm   INDICATION: confusion ?PRESS?; Signs/Symptoms:unwitnessed fall.   COMPARISON: None.   ACCESSION NUMBER(S): [id number]; [id number]   ORDERING CLINICIAN: CORBIN YEPEZ   TECHNIQUE: Axial noncontrast head CT   FINDINGS: Findings head CT: No acute intracranial hemorrhage mass effect or midline shift. Moderate dilatation of the lateral and 3rd ventricles with partial effacement of the subarachnoid space over the vertex. Nonspecific focal hypodensity in the left frontal corona radiata and within the lateral aspect of the right thalamus of uncertain chronicity. No territorial loss of gray-white distinction. No calvarial fracture. Atherosclerotic calcification of the cavernous internal carotid and bilateral V4 vertebral arteries is noted.   Findings cervical spine CT: No acute fracture or posttraumatic subluxation was identified. Disc osteophyte complexes indent the ventral thecal sac most prominently at C3-4 C4-5 C5-6 and C6-7 without high-grade central canal stenosis within the limits of CT.       Moderate dilatation of the lateral and 3rd ventricles with relative effacement of the subarachnoid space over the vertex suggesting the possibility of adult hydrocephalus. Please correlate clinically.   Minimal focal hypodensity in the left corona radiata and right lateral thalamus likely due to ischemia of uncertain chronicity. If recent ischemia were clinically suspected CT angiography and or MRI could be considered for added sensitivity and specificity in this regard.   No acute fracture or other acute abnormality of the cervical vertebral bodies was identified with degenerative changes as described above.   Signed by: Naseme Palmer 10/2/2023 12:56 PM  Dictation workstation:   [id number]    XR chest 1 view    Result Date: 10/2/2023  STUDY: Chest Radiograph; 10-2-2023 at 10:32 AM INDICATION: Hypertension. COMPARISON: None Available ACCESSION NUMBER(S): [id number] ORDERING CLINICIAN: CORBIN YEPEZ TECHNIQUE:  Frontal chest was obtained at 10:18 hours. FINDINGS: CARDIOMEDIASTINAL SILHOUETTE: Cardiomediastinal silhouette is normal in size and configuration.  LUNGS: There is mild elevation the right hemidiaphragm with subsegmental atelectasis in the right lung base.  ABDOMEN: No remarkable upper abdominal findings.  BONES: No acute osseous changes.    Right basilar subsegmental atelectasis. Signed by Anthony Sosa MD          Assessment/Plan   Principal Problem:    Hypertensive emergency  Active Problems:    Type 2 MI (myocardial infarction) (CMS/HCC)    NPH (normal pressure hydrocephalus) (CMS/HCC)    Type 2 diabetes mellitus with hyperglycemia, without long-term current use of insulin (CMS/HCC)      A 72 y/o M patient with PMHx of HTN, DMII per self-report, who presents for a syncopal event. Hx was difficult to obtain however from ED report it was mentioned that he was found down by his daughter and he was covered in urine, he doesn't recall any traumatic event and describes it as syncope last night. His vitals showed BP in 200s/100s, labs showed elevated trops, imaging showed findings suggestive of NPH. Started on cardene drip and admitted to CICU for further monitoring.     Neuro  Personality changes and wobbly gait per ED reports per daughter  NPH on imaging, NSG on board, recommending OP follow up and no acute surgical intervention  Currently calm AOx2-3     CV:   # Hypertensive emergency  # Type II demand ischemia   - Syncopal events, high trops, EKG findings suggestive of early repolarization, wide QRS likely from pacing, with more j point elevation.   Admit to CICU  Monitor VS  25 % decrease in first hour achieved, will aim for <160/110 in the first day    Continue cardene drip  Reported to be on labetalol and ccb per gaurang review, will try to obtain medication list otherwise it would be appropriate to at least start with ACE/ARB for his concurrent DM   TTE tomorrow  Trend trops   Drug screen   # Resp  No acute issues  # GI  No acute issues  # Renal  No acute issues   # MSK  No acute issues  # ID  Elevated WBC, could be 2/2 dehydration/hemoconcentration also reactive to acute state  No fever or focus of infection, U/A pending and less likely to be reliable with no sympotms  Ctm for now   # Endo  DM II, self-reported, no insulin use per patient  ISS and accucheks   F: prn  E: prn   N: 2 gm sodium   A: PIV   DVT PPx: Avoid (high fall risk)   Code status: Full code  NOK: Kate Glaser [phone]            Najma Mcneil MD

## 2023-10-02 NOTE — ED PROVIDER NOTES
HPI   Chief Complaint   Patient presents with    Fall     Pt found down during wellness check. Pt unable to state how long down and LKW. States he believes he fell sometime yesterday       73-year-old male significant past cardiac history with ?AICD in place presenting today after being found down in his apartment after his daughter  noticed confused speech.  According to the patient he was watching TV last night when he stood up he felt lightheaded and collapsed to the ground.  He denies head strike, loss of consciousness, denies taking blood thinners.  He does not believe the room felt like a spinning around him and he remembers falling onto the ground.  He did not have chest pain, shortness of breath, nausea vomiting, diarrhea, diaphoresis, chills preceding his presyncopal episode.  EMS responded and found him on the ground, naked, in his own urine.  He was noted to be hypertensive in the 200s/100s.  Upon presentation he denies any symptoms including headache, vision changes, neck pain, chest pain, shortness of breath, abdominal pain, nausea, vomiting, diarrhea, extremity pain, weakness, and paresthesias. he was a poor historian, often giving confused nonsensical answers.  He was unable to provide any medical, surgical or other medical history.     daughter at bedside reported that he has a history of hypertension, type 2 diabetes, and history of medication noncompliance. He takes labetalol and a calcium channel blocker for hypertension control.  They are unsure what medications he takes for his diabetes management. Additionally reported he has had 1 to 2 weeks of worsening personality changes, shuffling gait, and multiple episodes of incontinence.  His ICD was placed at [facility] but they are unsure of details other than that.   He received Mohs for a forehead lesion but did not follow-up with dermatology.  The daughter has noticed a recurrence of the lesion which has not been assessed by a physician.                           Oliver Coma Scale Score: 14                  Patient History   No past medical history on file.  No past surgical history on file.  No family history on file.  Social History     Tobacco Use    Smoking status: Not on file    Smokeless tobacco: Not on file   Substance Use Topics    Alcohol use: Not on file    Drug use: Not on file       Physical Exam   ED Triage Vitals [10/02/23 1009]   Temp Heart Rate Resp BP   36.7 °C (98.1 °F) 99 16 (!) 204/142      SpO2 Temp Source Heart Rate Source Patient Position   94 % Temporal Monitor Sitting      BP Location FiO2 (%)     Left arm --       Physical Exam  Constitutional:       Appearance: Normal appearance.   HENT:      Head: Normocephalic and atraumatic.      Right Ear: External ear normal.      Left Ear: External ear normal.      Nose: Nose normal.      Mouth/Throat:      Mouth: Mucous membranes are moist.      Pharynx: Oropharynx is clear.   Eyes:      Extraocular Movements: Extraocular movements intact.      Conjunctiva/sclera: Conjunctivae normal.      Pupils: Pupils are equal, round, and reactive to light.   Cardiovascular:      Rate and Rhythm: Normal rate and regular rhythm.      Pulses: Normal pulses.      Heart sounds: Normal heart sounds.   Pulmonary:      Effort: Pulmonary effort is normal.      Breath sounds: Normal breath sounds.   Abdominal:      General: Abdomen is flat. There is no distension.      Palpations: Abdomen is soft.      Tenderness: There is no abdominal tenderness. There is no guarding or rebound.   Skin:     General: Skin is warm and dry.      Capillary Refill: Capillary refill takes less than 2 seconds.   Neurological:      Mental Status: He is alert and oriented to person, place, and time. He is confused.      GCS: GCS eye subscore is 4. GCS verbal subscore is 4. GCS motor subscore is 6.      Cranial Nerves: Cranial nerves 2-12 are intact. No cranial nerve deficit.      Sensory: Sensation is intact. No sensory deficit.      Motor:  Weakness present.      Comments:  strength 4/5 bilateral upper and lower extremities       ED Course & MDM   ED Course as of 10/02/23 1131   Mon Oct 02, 2023   1121 XR chest 1 view [JY]   1121 WBC(!): 15.0 [JY]      ED Course User Index  [JY] Vincent DIXON Chente, DO       Medical Decision Making   73-year-old male presenting after a presyncopal fall with confusion and hypertension.  The patient's vitals were notable for hypertension to 204/142,  and heart rate >90s, but he was otherwise afebrile and had respiratory rate of less than 20.  Given worsening neurologic status over the last 2 weeks, and CT findings the patient likely has NPH.  The associated mental status changes may have exacerbated his nonadherence to his medication regimen leading to his hypertensive emergency.  Given his elevated troponin in the setting of his extremely high blood pressure, he likely has a type II NSTEMI as well.  He will be admitted to the CICU for continued monitoring.  Neurosurgery has been consulted for his NPH.  Working him up we also considered other cardiac versus neurologic versus orthostatic causes of his presyncopal episode.  However given his findings we believe the underlying cause may be his NPH and/or hypertensive encephalopathy.      EKG: Rate 98, regular rhythm, OH interval less than 250 MS, QRS prolonged at 180,  I calculated his QTc interval as 511 ms.  Atrial sensed V paced rhythm rhythm present.  Sgarbossa criteria negative for STEMI  as he did not have excessively discordant ST elevation in his precordial leads.      POCUS: No Signs of right heart strain.  No pericardial effusion.   parasternal short views were not ideal for assessment of wall motion abnormalities but on parasternal long and subxiphoid no focal wall motion abnormalities were noted.  IVC collapsible.  this helps rule out STEMI or acute  cardiac ischemia as a cause of his fall.  Additionally there are no signs of PE on this exam.    -Troponin was elevated.   In the setting of his hypertension this likely represents a hypertensive emergency and is secondary to his high blood pressure.  This represents a type II NSTEMI.    -  Head CT: Dilated ventricles concerning for hydrocephalus.  Given his signs and symptoms this may represent a refresher hydrocephalus.  additionally 2 hypodensities which could represent prior strokes. We will consult neurology for further work-up.    - CT C-spine:  no fracture.  Safe to remove cervical collar at this time    - VBG: Glucose elevated.  pH within normal limits.  Bicarb elevated.    Likely not in DKA.  Initial VBG showed normal potassium    -, rules out rhabdo from his recent fall    At this time his blood pressure is 180s/110s and will continue to update titrate his Cardizem drip.  We will place an arterial line for continuous blood pressure monitoring.  Additionally he will require admission to the CCU for further management of his blood pressure, and continued work-up of his elevated troponin.     Problem list:  Neuro (& Psych):   CT showed hydrocephalus.  Given the constellation of symptoms likely represents NPH, neurosurgery consulted  Endocrine:   10 units insulin given.  q1hr hour fingersticks  Cardiovascular:   Type II NSTEMI -  Will trend troponin.  Currently on Cardizem drip will uptitrate to a blood pressure goal of 110/80.  ICU admission for continued blood pressure monitoring and control.  Pulm / Resp:  on 2 L O2 nasal cannula.   wean O2 as tolerated.  Gastrointestinal:   N.p.o.  Genitourinary:   follow-up urinalysis.  UDS obtained due to the possibility of cocaine induced hypertension.  Hematologic:   WBC count of 15.  Unclear etiology,  potentially due to ongoing inflammation and stress.  Infectious Disease:  white blood cell count 15.   chest x-ray, UA, skin exam to assess for potential infectious etiology.  Lactate elevated 2.1 will trend.  Musculoskeletal:    Dermatology:   Will require outpatient dermatology  follow-up  FEN:  we will start maintenance fluids of LR at 150 mL an hour.  We will give 2 g magnesium for prolonged QTc and vasodilatory effect.    Patient was found to be hypokalemic after the initiation of an insulin, will replete.   Corrected sodium of 143.    Patient signed out to oncoming team  pending CICU admission.    Amount and/or Complexity of Data Reviewed  Labs: ordered.  Radiology: ordered.  ECG/medicine tests: ordered.        Procedure  Procedures     Yue Cosby  10/02/23 1528

## 2023-10-02 NOTE — CONSULTS
Date of Service:  10/2/2023 Attending Provider:  No att. providers found     Reason for Consultation:  Miguel Angel Santos is being seen today for a consult requested by No att. providers found.  Consulted at 2pm  Consult seen at 220PM     Assessment/Plan   Assessment:  73yM h/o HTN, HLD, ICD, DM2, p/w found down, had 1-2 wks of personality changes and episodes of incontinence, 's on arrival, CTH triventriculomegaly    Symptoms and imaging concerning for NPH as patient has been increasingly confused, multiple episodes of incontinence, and having falls.    Plan:  No acute neurosurgical intervention indicated at this time.  Recommend outpatient work up for possible NPH for LP, neuropsych evaluation. Please request a follow up closer to time of discharge.     Subjective   History of Present Illness:  Miguel Angel is a 73 y.o. male with No Principal Problem: There is no principal problem currently on the Problem List. Please update the Problem List and refresh.      Objective   Vitals:  Vitals:    10/02/23 1351   BP: 132/90   Pulse: 110   Resp: 16   Temp:    SpO2: 94%       EXAM  Constitutional: no acute distress  Cards: well perfused  Resp: breathing comfortably  ABD: nondistended  Neuro: Ox3 (confused) 5/5x4  Psych: appropriate  Skin: no obvious lesions    Diagnostic Results:  CT scan of the head was reviewed and is notable for ventriculomegaly    Time spent on the history, physical examination, assessment, plan, and coordination of care for this patient was 15 minutes.

## 2023-10-02 NOTE — PROGRESS NOTES
"   10/02/23 1412   Discharge Planning   Living Arrangements Alone   Support Systems Children;Spouse/significant other;Friends/neighbors   Assistance Needed Home assistance, family education for SNF and living independently; nutrition, medication   Type of Residence Private residence   Number of Stairs to Enter Residence 0   Number of Stairs Within Residence 0  (elevator)   Do you have animals or pets at home? No   Does the patient need discharge transport arranged? Yes   RoundTrip coordination needed? Yes   Has discharge transport been arranged? No     SW consulted initially in order to locate pt's emergency contact and/or family members.   SW arrived, pt's daughter present at bedside, reporting they were sent to [facility] initially, then  ED, and had to park.  SW and pt's daughter, Destiny, discussed pt's current situation, she advised pt \"does not have much money, is on a fixed income\".  Pt currently resides at \"[facility] a senior building, where his daughter states pt's \"girlfriend\" also has an apartment there. Destiny requested a UA drug screen from provider, due to pt's concerns with failing health. Destiny requested POA information, as well as Home Care/care giving information.   SW and family discussed pt's ongoing needs, family willing to take turns assisting with care giving until acclimated with new routine(s). Pt admitted medically.   Pt shared with SW that he has not been regularly removing his contacts from his eyes, has not been taking his medications as prescribed, or sometimes at all, and pt agreed that he has been feeling \"sad\". Pt reports willingness to be independent; pt tearful as he states he is \"motivated to get better\".  VASU Cabello  "

## 2023-10-02 NOTE — ED PROVIDER NOTES
Patient was signed out to me by Yue Young, MS4.     Admitted to CICU.  73 year old with past cardiac history presents after being found down in his apartment.   Found to be hypertensive on cardine drip.  Elevated Troponin & CK. Waiting on repeat Troponin.   WBC 15.  CT showing hydrocephalus     Neurosurgery was consulted.      Agata Bourgeois  10/02/23 1945       Nelly Shafer MD  10/18/23 6927

## 2023-10-03 ENCOUNTER — APPOINTMENT (OUTPATIENT)
Dept: CARDIOLOGY | Facility: HOSPITAL | Age: 73
DRG: 305 | End: 2023-10-03
Payer: MEDICARE

## 2023-10-03 DIAGNOSIS — G91.2 NORMAL PRESSURE HYDROCEPHALUS (MULTI): Primary | ICD-10-CM

## 2023-10-03 PROBLEM — I11.9 HYPERTENSIVE HEART DISEASE WITHOUT HEART FAILURE: Status: ACTIVE | Noted: 2023-10-03

## 2023-10-03 LAB
ALBUMIN SERPL BCP-MCNC: 3.4 G/DL (ref 3.4–5)
ALP SERPL-CCNC: 59 U/L (ref 33–136)
ALT SERPL W P-5'-P-CCNC: 15 U/L (ref 10–52)
AMPHETAMINES UR QL SCN: NORMAL
AMPHETAMINES UR QL SCN: NORMAL
ANION GAP SERPL CALC-SCNC: 16 MMOL/L (ref 10–20)
AST SERPL W P-5'-P-CCNC: 21 U/L (ref 9–39)
BARBITURATES UR QL SCN: NORMAL
BARBITURATES UR QL SCN: NORMAL
BASOPHILS # BLD AUTO: 0.04 X10*3/UL (ref 0–0.1)
BASOPHILS NFR BLD AUTO: 0.3 %
BENZODIAZ UR QL SCN: NORMAL
BENZODIAZ UR QL SCN: NORMAL
BILIRUB SERPL-MCNC: 1.6 MG/DL (ref 0–1.2)
BUN SERPL-MCNC: 26 MG/DL (ref 6–23)
BZE UR QL SCN: NORMAL
BZE UR QL SCN: NORMAL
CALCIUM SERPL-MCNC: 9.5 MG/DL (ref 8.6–10.6)
CANNABINOIDS UR QL SCN: NORMAL
CANNABINOIDS UR QL SCN: NORMAL
CARDIAC TROPONIN I PNL SERPL HS: 145 NG/L (ref 0–53)
CHLORIDE SERPL-SCNC: 100 MMOL/L (ref 98–107)
CO2 SERPL-SCNC: 26 MMOL/L (ref 21–32)
CREAT SERPL-MCNC: 0.99 MG/DL (ref 0.5–1.3)
EJECTION FRACTION APICAL 4 CHAMBER: 67.1
EOSINOPHIL # BLD AUTO: 0.15 X10*3/UL (ref 0–0.4)
EOSINOPHIL NFR BLD AUTO: 1.1 %
ERYTHROCYTE [DISTWIDTH] IN BLOOD BY AUTOMATED COUNT: 12.4 % (ref 11.5–14.5)
FENTANYL+NORFENTANYL UR QL SCN: NORMAL
FENTANYL+NORFENTANYL UR QL SCN: NORMAL
GFR SERPL CREATININE-BSD FRML MDRD: 80 ML/MIN/1.73M*2
GLUCOSE BLD MANUAL STRIP-MCNC: 219 MG/DL (ref 74–99)
GLUCOSE BLD MANUAL STRIP-MCNC: 330 MG/DL (ref 74–99)
GLUCOSE BLD MANUAL STRIP-MCNC: 342 MG/DL (ref 74–99)
GLUCOSE SERPL-MCNC: 281 MG/DL (ref 74–99)
HCT VFR BLD AUTO: 45.8 % (ref 41–52)
HGB BLD-MCNC: 16.2 G/DL (ref 13.5–17.5)
IMM GRANULOCYTES # BLD AUTO: 0.05 X10*3/UL (ref 0–0.5)
IMM GRANULOCYTES NFR BLD AUTO: 0.4 % (ref 0–0.9)
LYMPHOCYTES # BLD AUTO: 2.03 X10*3/UL (ref 0.8–3)
LYMPHOCYTES NFR BLD AUTO: 14.8 %
MAGNESIUM SERPL-MCNC: 2.12 MG/DL (ref 1.6–2.4)
MCH RBC QN AUTO: 32.4 PG (ref 26–34)
MCHC RBC AUTO-ENTMCNC: 35.4 G/DL (ref 32–36)
MCV RBC AUTO: 92 FL (ref 80–100)
MONOCYTES # BLD AUTO: 1.49 X10*3/UL (ref 0.05–0.8)
MONOCYTES NFR BLD AUTO: 10.9 %
NEUTROPHILS # BLD AUTO: 9.93 X10*3/UL (ref 1.6–5.5)
NEUTROPHILS NFR BLD AUTO: 72.5 %
NRBC BLD-RTO: 0 /100 WBCS (ref 0–0)
OPIATES UR QL SCN: NORMAL
OPIATES UR QL SCN: NORMAL
OXYCODONE+OXYMORPHONE UR QL SCN: NORMAL
OXYCODONE+OXYMORPHONE UR QL SCN: NORMAL
PCP UR QL SCN: NORMAL
PCP UR QL SCN: NORMAL
PHOSPHATE SERPL-MCNC: 2.5 MG/DL (ref 2.5–4.9)
PLATELET # BLD AUTO: 202 X10*3/UL (ref 150–450)
PMV BLD AUTO: 10.7 FL (ref 7.5–11.5)
POTASSIUM SERPL-SCNC: 3.7 MMOL/L (ref 3.5–5.3)
PROT SERPL-MCNC: 6 G/DL (ref 6.4–8.2)
RBC # BLD AUTO: 5 X10*6/UL (ref 4.5–5.9)
SODIUM SERPL-SCNC: 138 MMOL/L (ref 136–145)
T PALLIDUM AB SER QL: NONREACTIVE
WBC # BLD AUTO: 13.7 X10*3/UL (ref 4.4–11.3)

## 2023-10-03 PROCEDURE — 82607 VITAMIN B-12: CPT

## 2023-10-03 PROCEDURE — 85025 COMPLETE CBC W/AUTO DIFF WBC: CPT

## 2023-10-03 PROCEDURE — 93280 PM DEVICE PROGR EVAL DUAL: CPT | Performed by: STUDENT IN AN ORGANIZED HEALTH CARE EDUCATION/TRAINING PROGRAM

## 2023-10-03 PROCEDURE — 93290 INTERROG DEV EVAL ICPMS IP: CPT | Performed by: STUDENT IN AN ORGANIZED HEALTH CARE EDUCATION/TRAINING PROGRAM

## 2023-10-03 PROCEDURE — 99233 SBSQ HOSP IP/OBS HIGH 50: CPT

## 2023-10-03 PROCEDURE — 2500000004 HC RX 250 GENERAL PHARMACY W/ HCPCS (ALT 636 FOR OP/ED)

## 2023-10-03 PROCEDURE — 83036 HEMOGLOBIN GLYCOSYLATED A1C: CPT

## 2023-10-03 PROCEDURE — 84100 ASSAY OF PHOSPHORUS: CPT

## 2023-10-03 PROCEDURE — 84484 ASSAY OF TROPONIN QUANT: CPT

## 2023-10-03 PROCEDURE — 97112 NEUROMUSCULAR REEDUCATION: CPT | Mod: GP

## 2023-10-03 PROCEDURE — 1100000001 HC PRIVATE ROOM DAILY

## 2023-10-03 PROCEDURE — 2500000001 HC RX 250 WO HCPCS SELF ADMINISTERED DRUGS (ALT 637 FOR MEDICARE OP): Performed by: STUDENT IN AN ORGANIZED HEALTH CARE EDUCATION/TRAINING PROGRAM

## 2023-10-03 PROCEDURE — 80053 COMPREHEN METABOLIC PANEL: CPT

## 2023-10-03 PROCEDURE — 96372 THER/PROPH/DIAG INJ SC/IM: CPT

## 2023-10-03 PROCEDURE — 82947 ASSAY GLUCOSE BLOOD QUANT: CPT

## 2023-10-03 PROCEDURE — 2500000002 HC RX 250 W HCPCS SELF ADMINISTERED DRUGS (ALT 637 FOR MEDICARE OP, ALT 636 FOR OP/ED)

## 2023-10-03 PROCEDURE — 93306 TTE W/DOPPLER COMPLETE: CPT | Performed by: INTERNAL MEDICINE

## 2023-10-03 PROCEDURE — 93306 TTE W/DOPPLER COMPLETE: CPT

## 2023-10-03 PROCEDURE — 86780 TREPONEMA PALLIDUM: CPT

## 2023-10-03 PROCEDURE — 82746 ASSAY OF FOLIC ACID SERUM: CPT

## 2023-10-03 PROCEDURE — 97530 THERAPEUTIC ACTIVITIES: CPT | Mod: GP

## 2023-10-03 PROCEDURE — 2500000001 HC RX 250 WO HCPCS SELF ADMINISTERED DRUGS (ALT 637 FOR MEDICARE OP)

## 2023-10-03 PROCEDURE — 97162 PT EVAL MOD COMPLEX 30 MIN: CPT | Mod: GP

## 2023-10-03 PROCEDURE — 83735 ASSAY OF MAGNESIUM: CPT

## 2023-10-03 PROCEDURE — 36415 COLL VENOUS BLD VENIPUNCTURE: CPT

## 2023-10-03 PROCEDURE — 2500000004 HC RX 250 GENERAL PHARMACY W/ HCPCS (ALT 636 FOR OP/ED): Performed by: STUDENT IN AN ORGANIZED HEALTH CARE EDUCATION/TRAINING PROGRAM

## 2023-10-03 PROCEDURE — 93286 PERI-PX EVAL PM/LDLS PM IP: CPT

## 2023-10-03 PROCEDURE — 2500000004 HC RX 250 GENERAL PHARMACY W/ HCPCS (ALT 636 FOR OP/ED): Performed by: EMERGENCY MEDICINE

## 2023-10-03 RX ORDER — LOSARTAN POTASSIUM 25 MG/1
25 TABLET ORAL DAILY
Status: DISCONTINUED | OUTPATIENT
Start: 2023-10-03 | End: 2023-10-06

## 2023-10-03 RX ORDER — DAPAGLIFLOZIN 10 MG/1
10 TABLET, FILM COATED ORAL DAILY
Status: DISCONTINUED | OUTPATIENT
Start: 2023-10-03 | End: 2023-10-13 | Stop reason: HOSPADM

## 2023-10-03 RX ORDER — CHOLECALCIFEROL (VITAMIN D3) 25 MCG
25 TABLET ORAL DAILY
Status: DISCONTINUED | OUTPATIENT
Start: 2023-10-03 | End: 2023-10-13 | Stop reason: HOSPADM

## 2023-10-03 RX ORDER — NIFEDIPINE 90 MG/1
90 TABLET, EXTENDED RELEASE ORAL DAILY
COMMUNITY
Start: 2023-07-10 | End: 2023-10-13 | Stop reason: HOSPADM

## 2023-10-03 RX ORDER — CHLORTHALIDONE 25 MG/1
50 TABLET ORAL DAILY
Status: DISCONTINUED | OUTPATIENT
Start: 2023-10-03 | End: 2023-10-05

## 2023-10-03 RX ORDER — DOXAZOSIN 4 MG/1
4 TABLET ORAL DAILY
COMMUNITY
Start: 2023-06-19 | End: 2023-10-13 | Stop reason: HOSPADM

## 2023-10-03 RX ORDER — ATORVASTATIN CALCIUM 40 MG/1
40 TABLET, FILM COATED ORAL DAILY
COMMUNITY
Start: 2023-07-11

## 2023-10-03 RX ORDER — CARVEDILOL 12.5 MG/1
12.5 TABLET ORAL EVERY 12 HOURS
Status: DISCONTINUED | OUTPATIENT
Start: 2023-10-04 | End: 2023-10-13 | Stop reason: HOSPADM

## 2023-10-03 RX ORDER — OFLOXACIN 3 MG/ML
1 SOLUTION/ DROPS OPHTHALMIC 4 TIMES DAILY
Status: DISPENSED | OUTPATIENT
Start: 2023-10-03 | End: 2023-10-08

## 2023-10-03 RX ORDER — ASPIRIN 81 MG/1
81 TABLET ORAL DAILY
Status: DISCONTINUED | OUTPATIENT
Start: 2023-10-03 | End: 2023-10-13 | Stop reason: HOSPADM

## 2023-10-03 RX ORDER — CARVEDILOL 3.12 MG/1
6.25 TABLET ORAL EVERY 12 HOURS
Status: DISCONTINUED | OUTPATIENT
Start: 2023-10-03 | End: 2023-10-03

## 2023-10-03 RX ORDER — MULTIVIT-MIN/IRON FUM/FOLIC AC 7.5 MG-4
1 TABLET ORAL DAILY
Status: DISCONTINUED | OUTPATIENT
Start: 2023-10-03 | End: 2023-10-13 | Stop reason: HOSPADM

## 2023-10-03 RX ORDER — LISINOPRIL 40 MG/1
40 TABLET ORAL DAILY
COMMUNITY
Start: 2023-07-10 | End: 2023-10-13 | Stop reason: HOSPADM

## 2023-10-03 RX ORDER — ASCORBIC ACID 500 MG
500 TABLET ORAL DAILY
Status: DISCONTINUED | OUTPATIENT
Start: 2023-10-03 | End: 2023-10-13 | Stop reason: HOSPADM

## 2023-10-03 RX ORDER — POTASSIUM CHLORIDE 750 MG/1
10 TABLET, EXTENDED RELEASE ORAL DAILY
COMMUNITY
Start: 2023-07-08 | End: 2023-10-13 | Stop reason: HOSPADM

## 2023-10-03 RX ORDER — POTASSIUM CHLORIDE 14.9 MG/ML
20 INJECTION INTRAVENOUS
Status: DISPENSED | OUTPATIENT
Start: 2023-10-03 | End: 2023-10-03

## 2023-10-03 RX ORDER — METFORMIN HYDROCHLORIDE 1000 MG/1
1000 TABLET ORAL 2 TIMES DAILY
COMMUNITY
Start: 2023-07-10

## 2023-10-03 RX ORDER — DAPAGLIFLOZIN 10 MG/1
10 TABLET, FILM COATED ORAL
COMMUNITY
Start: 2023-05-23

## 2023-10-03 RX ORDER — ORAL SEMAGLUTIDE 7 MG/1
1 TABLET ORAL
COMMUNITY
Start: 2023-06-22 | End: 2023-10-13 | Stop reason: HOSPADM

## 2023-10-03 RX ORDER — CARVEDILOL 3.12 MG/1
6.25 TABLET ORAL ONCE
Status: COMPLETED | OUTPATIENT
Start: 2023-10-03 | End: 2023-10-03

## 2023-10-03 RX ORDER — ERYTHROMYCIN 5 MG/G
1 OINTMENT OPHTHALMIC NIGHTLY
Status: DISCONTINUED | OUTPATIENT
Start: 2023-10-03 | End: 2023-10-03

## 2023-10-03 RX ORDER — METOPROLOL SUCCINATE 200 MG/1
200 TABLET, EXTENDED RELEASE ORAL DAILY
COMMUNITY
Start: 2023-08-14 | End: 2023-10-13 | Stop reason: HOSPADM

## 2023-10-03 RX ORDER — CHLORTHALIDONE 50 MG/1
50 TABLET ORAL DAILY
COMMUNITY
Start: 2023-07-11 | End: 2023-10-13 | Stop reason: HOSPADM

## 2023-10-03 RX ORDER — GLIPIZIDE 10 MG/1
10 TABLET ORAL
COMMUNITY
Start: 2023-08-29 | End: 2023-10-13 | Stop reason: HOSPADM

## 2023-10-03 RX ORDER — ATORVASTATIN CALCIUM 40 MG/1
40 TABLET, FILM COATED ORAL DAILY
Status: DISCONTINUED | OUTPATIENT
Start: 2023-10-03 | End: 2023-10-13 | Stop reason: HOSPADM

## 2023-10-03 RX ORDER — HYDRALAZINE HYDROCHLORIDE 20 MG/ML
10 INJECTION INTRAMUSCULAR; INTRAVENOUS ONCE
Status: COMPLETED | OUTPATIENT
Start: 2023-10-03 | End: 2023-10-03

## 2023-10-03 RX ADMIN — CHLORTHALIDONE 50 MG: 25 TABLET ORAL at 18:30

## 2023-10-03 RX ADMIN — DAPAGLIFLOZIN 10 MG: 10 TABLET, FILM COATED ORAL at 15:06

## 2023-10-03 RX ADMIN — INSULIN LISPRO 8 UNITS: 100 INJECTION, SOLUTION INTRAVENOUS; SUBCUTANEOUS at 07:55

## 2023-10-03 RX ADMIN — HYDRALAZINE HYDROCHLORIDE 10 MG: 20 INJECTION INTRAMUSCULAR; INTRAVENOUS at 22:58

## 2023-10-03 RX ADMIN — CARVEDILOL 6.25 MG: 3.12 TABLET, FILM COATED ORAL at 21:53

## 2023-10-03 RX ADMIN — INSULIN GLARGINE 10 UNITS: 100 INJECTION, SOLUTION SUBCUTANEOUS at 21:54

## 2023-10-03 RX ADMIN — LOSARTAN POTASSIUM 25 MG: 25 TABLET, FILM COATED ORAL at 10:02

## 2023-10-03 RX ADMIN — POTASSIUM CHLORIDE 20 MEQ: 14.9 INJECTION, SOLUTION INTRAVENOUS at 00:07

## 2023-10-03 RX ADMIN — PERFLUTREN 0.5 ML: 6.52 INJECTION, SUSPENSION INTRAVENOUS at 08:48

## 2023-10-03 RX ADMIN — ATORVASTATIN CALCIUM 40 MG: 40 TABLET, FILM COATED ORAL at 15:06

## 2023-10-03 RX ADMIN — Medication 25 MCG: at 17:41

## 2023-10-03 RX ADMIN — POTASSIUM CHLORIDE 20 MEQ: 14.9 INJECTION, SOLUTION INTRAVENOUS at 07:57

## 2023-10-03 RX ADMIN — OXYCODONE HYDROCHLORIDE AND ACETAMINOPHEN 500 MG: 500 TABLET ORAL at 17:40

## 2023-10-03 RX ADMIN — INSULIN LISPRO 8 UNITS: 100 INJECTION, SOLUTION INTRAVENOUS; SUBCUTANEOUS at 13:15

## 2023-10-03 RX ADMIN — POTASSIUM CHLORIDE 20 MEQ: 14.9 INJECTION, SOLUTION INTRAVENOUS at 02:03

## 2023-10-03 RX ADMIN — NICARDIPINE HYDROCHLORIDE 5 MG/HR: 0.2 INJECTION, SOLUTION INTRAVENOUS at 09:00

## 2023-10-03 RX ADMIN — CARVEDILOL 6.25 MG: 3.12 TABLET, FILM COATED ORAL at 22:58

## 2023-10-03 RX ADMIN — INSULIN LISPRO 4 UNITS: 100 INJECTION, SOLUTION INTRAVENOUS; SUBCUTANEOUS at 17:49

## 2023-10-03 RX ADMIN — ASPIRIN 81 MG: 81 TABLET, COATED ORAL at 15:06

## 2023-10-03 RX ADMIN — Medication 1 TABLET: at 15:00

## 2023-10-03 SDOH — ECONOMIC STABILITY: HOUSING INSECURITY
IN THE LAST 12 MONTHS, WAS THERE A TIME WHEN YOU DID NOT HAVE A STEADY PLACE TO SLEEP OR SLEPT IN A SHELTER (INCLUDING NOW)?: YES

## 2023-10-03 SDOH — SOCIAL STABILITY: SOCIAL NETWORK: ARE YOU MARRIED, WIDOWED, DIVORCED, SEPARATED, NEVER MARRIED, OR LIVING WITH A PARTNER?: DIVORCED

## 2023-10-03 SDOH — ECONOMIC STABILITY: FOOD INSECURITY: WITHIN THE PAST 12 MONTHS, THE FOOD YOU BOUGHT JUST DIDN'T LAST AND YOU DIDN'T HAVE MONEY TO GET MORE.: NEVER TRUE

## 2023-10-03 SDOH — ECONOMIC STABILITY: INCOME INSECURITY: IN THE LAST 12 MONTHS, WAS THERE A TIME WHEN YOU WERE NOT ABLE TO PAY THE MORTGAGE OR RENT ON TIME?: NO

## 2023-10-03 SDOH — ECONOMIC STABILITY: INCOME INSECURITY: IN THE PAST 12 MONTHS, HAS THE ELECTRIC, GAS, OIL, OR WATER COMPANY THREATENED TO SHUT OFF SERVICE IN YOUR HOME?: NO

## 2023-10-03 SDOH — ECONOMIC STABILITY: FOOD INSECURITY: WITHIN THE PAST 12 MONTHS, YOU WORRIED THAT YOUR FOOD WOULD RUN OUT BEFORE YOU GOT MONEY TO BUY MORE.: NEVER TRUE

## 2023-10-03 SDOH — ECONOMIC STABILITY: HOUSING INSECURITY: IN THE LAST 12 MONTHS, HOW MANY PLACES HAVE YOU LIVED?: 1

## 2023-10-03 ASSESSMENT — PAIN - FUNCTIONAL ASSESSMENT
PAIN_FUNCTIONAL_ASSESSMENT: 0-10

## 2023-10-03 ASSESSMENT — PAIN SCALES - GENERAL
PAINLEVEL_OUTOF10: 0 - NO PAIN

## 2023-10-03 ASSESSMENT — COGNITIVE AND FUNCTIONAL STATUS - GENERAL
MOBILITY SCORE: 8
WALKING IN HOSPITAL ROOM: TOTAL
STANDING UP FROM CHAIR USING ARMS: TOTAL
TURNING FROM BACK TO SIDE WHILE IN FLAT BAD: A LOT
MOVING TO AND FROM BED TO CHAIR: TOTAL
CLIMB 3 TO 5 STEPS WITH RAILING: TOTAL
MOVING FROM LYING ON BACK TO SITTING ON SIDE OF FLAT BED WITH BEDRAILS: A LOT

## 2023-10-03 NOTE — PROGRESS NOTES
Miguel Angel Santos is a 73 y.o. male on day 1 of admission presenting with Hypertensive emergency.    Subjective   Doing better this morning, reports he slept well, no complaints, no chest pain, sob, or cough. No headache or visual changes.     Miguel Angel Santos is a 73 y.o. male with PMHx of HTN, DMII per self-report, who presented for a syncopal event. Hx was taken from the patient with difficulties as he is having trouble with thought process and replying to questions however he was doing well when asked yes/no questions. The patient reported that he fell yesterday, lost consciousness, unsure for how long, and unsure why, he reported that he can't recall the whole incident but he denies tripping on something and he reported just blacking out. He denies any chest pain, sob, cough, fever, chills, n/v/d/c, abdominal pain and rest of ROS was unremarkable.   He reported he follows at [facility] and has some medication which he has been having trouble taking them.     Met family at bedside, they reported that for the past 3 days they have been trying to contact him with no response, no one is aware of the mechanism of the fall, they also reported that for the past few week to months he has not been him self, recently having recurrent falls, unable to have full conversations and interactions as before, along with urinary incontinence.       ED course:   Was found to be hypertensive 200s/100s, 204/142  OR 99, RR 16, T 36.7, Sat 94% on RA   CBC: WBC 15, Hgb 17.8,    Chem: Glucose 359, K 3.8, Chloride 97, , Bicarb 26, Cr 0.98, BUN 20   EKG: Rate 98, regular rhythm, OR interval less than 250 MS, QRS prolonged at 180,  I calculated his QTc interval as 511 ms.  Atrial sensed V paced rhythm rhythm present.  Sgarbossa criteria negative for STEMI  as he did not have excessively discordant ST elevation in his precordial leads.      CTH showed  ilatation of the lateral and 3rd ventricles with relative effacement of the subarachnoid  "space over the vertex suggesting the possibility of adult hydrocephalus. For which NSG was consulted and no surgical intervention was needed.     He was started on cardene drip, weaned off and started on coreg and losartan.     To follow  [ ] HgbA1c and start empagliflozin if high   [ ] Device interrogation   [ ] Neurology, endocrinology and PCP follow up  (WANTS TO establish care here)   [ ] Cognitive eval requested by family, order is in, OT performs it per psych or if not can reach out to geriatrics, will wait on OT to pass by today if not will consult Hanny tomorrow     Objective   Vitals:    10/03/23 0700   BP: (!) 150/98   Pulse: 87   Resp: 15   Temp:    SpO2: 97%       Physical Exam  BP (!) 150/98 (BP Location: Left arm)   Pulse 87   Temp 36.6 °C (97.9 °F) (Temporal)   Resp 15   Ht 1.93 m (6' 4\")   Wt 91.4 kg (201 lb 8 oz)   SpO2 97%   BMI 24.53 kg/m²     General Appearance:  Alert, cooperative, no distress, appears stated age   Head:  Normocephalic, without obvious abnormality, atraumatic                   Neck: Supple, symmetrical,       Lungs:   Clear to auscultation bilaterally, respirations unlabored   Chest Wall:  No tenderness or deformity   Heart:  Regular rate and rhythm, S1, S2 normal, no murmur, rub or gallop   Abdomen:   Soft, non-tender, bowel sounds active all four quadrants,  no masses, no organomegaly           Extremities: Extremities normal, atraumatic, no cyanosis or edema                       Last Recorded Vitals  Blood pressure (!) 150/98, pulse 87, temperature 36.6 °C (97.9 °F), temperature source Temporal, resp. rate 15, height 1.93 m (6' 4\"), weight 91.4 kg (201 lb 8 oz), SpO2 97 %.  Intake/Output last 3 Shifts:  I/O last 3 completed shifts:  In: 1988.9 (21.8 mL/kg) [P.O.:840; I.V.:848.9 (9.3 mL/kg); IV Piggyback:300]  Out: - (0 mL/kg)   Weight: 91.4 kg     Relevant Results  Scheduled medications  carvedilol, 6.25 mg, oral, q12h  insulin glargine, 10 Units, subcutaneous, " Nightly  insulin lispro, 0-10 Units, subcutaneous, TID with meals  perflutren lipid microspheres, 0.5 mL, intravenous, Once in imaging  perflutren protein A microsphere, 0.5 mL, intravenous, Once in imaging  potassium chloride, 20 mEq, intravenous, q2h  sulfur hexafluoride microsphr, 2 mL, intravenous, Once in imaging      Continuous medications  niCARdipine, 2.5-15 mg/hr, Last Rate: 5 mg/hr (10/03/23 0700)      PRN medications  PRN medications: dextrose, dextrose, glucagon    Results for orders placed or performed during the hospital encounter of 10/02/23 (from the past 24 hour(s))   CBC and Auto Differential   Result Value Ref Range    WBC 15.0 (H) 4.4 - 11.3 x10*3/uL    nRBC 0.0 0.0 - 0.0 /100 WBCs    RBC 5.55 4.50 - 5.90 x10*6/uL    Hemoglobin 17.8 (H) 13.5 - 17.5 g/dL    Hematocrit 50.7 41.0 - 52.0 %    MCV 91 80 - 100 fL    MCH 32.1 26.0 - 34.0 pg    MCHC 35.1 32.0 - 36.0 g/dL    RDW 12.6 11.5 - 14.5 %    Platelets 207 150 - 450 x10*3/uL    MPV 11.3 7.5 - 11.5 fL    Neutrophils % 86.9 40.0 - 80.0 %    Immature Granulocytes %, Automated 0.5 0.0 - 0.9 %    Lymphocytes % 5.3 13.0 - 44.0 %    Monocytes % 7.1 2.0 - 10.0 %    Eosinophils % 0.1 0.0 - 6.0 %    Basophils % 0.1 0.0 - 2.0 %    Neutrophils Absolute 13.00 (H) 1.60 - 5.50 x10*3/uL    Immature Granulocytes Absolute, Automated 0.08 0.00 - 0.50 x10*3/uL    Lymphocytes Absolute 0.79 (L) 0.80 - 3.00 x10*3/uL    Monocytes Absolute 1.07 (H) 0.05 - 0.80 x10*3/uL    Eosinophils Absolute 0.01 0.00 - 0.40 x10*3/uL    Basophils Absolute 0.02 0.00 - 0.10 x10*3/uL   Comprehensive metabolic panel   Result Value Ref Range    Glucose 359 (H) 74 - 99 mg/dL    Sodium 139 136 - 145 mmol/L    Potassium 3.8 3.5 - 5.3 mmol/L    Chloride 97 (L) 98 - 107 mmol/L    Bicarbonate 26 21 - 32 mmol/L    Anion Gap 20 10 - 20 mmol/L    Urea Nitrogen 20 6 - 23 mg/dL    Creatinine 0.98 0.50 - 1.30 mg/dL    eGFR 81 >60 mL/min/1.73m*2    Calcium 10.1 8.6 - 10.6 mg/dL    Albumin 4.2 3.4 - 5.0  g/dL    Alkaline Phosphatase 66 33 - 136 U/L    Total Protein 7.6 6.4 - 8.2 g/dL    AST 27 9 - 39 U/L    Bilirubin, Total 1.5 (H) 0.0 - 1.2 mg/dL    ALT 18 10 - 52 U/L   Ammonia   Result Value Ref Range    Ammonia 30 16 - 53 umol/L   Troponin I, High Sensitivity   Result Value Ref Range    Troponin I, High Sensitivity 149 (HH) 0 - 53 ng/L   B-Type Natriuretic Peptide   Result Value Ref Range     (H) 0 - 99 pg/mL   Creatine Kinase   Result Value Ref Range    Creatine Kinase 396 (H) 0 - 325 U/L   Blood Gas Venous Full Panel   Result Value Ref Range    POCT pH, Venous 7.43 7.33 - 7.43 pH    POCT pCO2, Venous 40 (L) 41 - 51 mm Hg    POCT pO2, Venous 52 (H) 35 - 45 mm Hg    POCT SO2, Venous 79 (H) 45 - 75 %    POCT Oxy Hemoglobin, Venous 76.8 (H) 45.0 - 75.0 %    POCT Hematocrit Calculated, Venous 55.0 (H) 41.0 - 52.0 %    POCT Sodium, Venous 134 (L) 136 - 145 mmol/L    POCT Potassium, Venous 4.0 3.5 - 5.3 mmol/L    POCT Chloride, Venous 99 98 - 107 mmol/L    POCT Ionized Calicum, Venous 1.21 1.10 - 1.33 mmol/L    POCT Glucose, Venous 420 (H) 74 - 99 mg/dL    POCT Lactate, Venous 2.3 (H) 0.4 - 2.0 mmol/L    POCT Base Excess, Venous 2.0 -2.0 - 3.0 mmol/L    POCT HCO3 Calculated, Venous 26.5 (H) 22.0 - 26.0 mmol/L    POCT Hemoglobin, Venous 18.4 (H) 13.5 - 17.5 g/dL    POCT Anion Gap, Venous 13.0 10.0 - 25.0 mmol/L    Patient Temperature 37.0 degrees Celsius   Blood Gas Venous Full Panel Unsolicited   Result Value Ref Range    POCT pH, Venous 7.52 (H) 7.33 - 7.43 pH    POCT pCO2, Venous 34 (L) 41 - 51 mm Hg    POCT pO2, Venous 54 (H) 35 - 45 mm Hg    POCT SO2, Venous 88 (H) 45 - 75 %    POCT Oxy Hemoglobin, Venous 85.2 (H) 45.0 - 75.0 %    POCT Hematocrit Calculated, Venous 54.0 (H) 41.0 - 52.0 %    POCT Sodium, Venous 136 136 - 145 mmol/L    POCT Potassium, Venous 3.5 3.5 - 5.3 mmol/L    POCT Chloride, Venous 100 98 - 107 mmol/L    POCT Ionized Calicum, Venous 1.22 1.10 - 1.33 mmol/L    POCT Glucose, Venous 355 (H)  74 - 99 mg/dL    POCT Lactate, Venous 2.2 (H) 0.4 - 2.0 mmol/L    POCT Base Excess, Venous 5.2 (H) -2.0 - 3.0 mmol/L    POCT HCO3 Calculated, Venous 27.8 (H) 22.0 - 26.0 mmol/L    POCT Hemoglobin, Venous 18.1 (H) 13.5 - 17.5 g/dL    POCT Anion Gap, Venous 12.0 10.0 - 25.0 mmol/L    Patient Temperature 37.0 degrees Celsius   POCT GLUCOSE   Result Value Ref Range    POCT Glucose 313 (H) 74 - 99 mg/dL   POCT GLUCOSE   Result Value Ref Range    POCT Glucose 321 (H) 74 - 99 mg/dL   Troponin I, High Sensitivity   Result Value Ref Range    Troponin I, High Sensitivity 151 (HH) 0 - 53 ng/L   POCT GLUCOSE   Result Value Ref Range    POCT Glucose 304 (H) 74 - 99 mg/dL   Troponin I, High Sensitivity   Result Value Ref Range    Troponin I, High Sensitivity 153 (HH) 0 - 53 ng/L   Lactate   Result Value Ref Range    Lactate 1.3 0.4 - 2.0 mmol/L   CBC and Auto Differential   Result Value Ref Range    WBC 15.0 (H) 4.4 - 11.3 x10*3/uL    nRBC 0.0 0.0 - 0.0 /100 WBCs    RBC 5.19 4.50 - 5.90 x10*6/uL    Hemoglobin 17.5 13.5 - 17.5 g/dL    Hematocrit 46.8 41.0 - 52.0 %    MCV 90 80 - 100 fL    MCH 33.7 26.0 - 34.0 pg    MCHC 37.4 (H) 32.0 - 36.0 g/dL    RDW 12.7 11.5 - 14.5 %    Platelets 227 150 - 450 x10*3/uL    MPV 11.0 7.5 - 11.5 fL    Neutrophils % 77.9 40.0 - 80.0 %    Immature Granulocytes %, Automated 0.5 0.0 - 0.9 %    Lymphocytes % 11.1 13.0 - 44.0 %    Monocytes % 9.8 2.0 - 10.0 %    Eosinophils % 0.4 0.0 - 6.0 %    Basophils % 0.3 0.0 - 2.0 %    Neutrophils Absolute 11.70 (H) 1.60 - 5.50 x10*3/uL    Immature Granulocytes Absolute, Automated 0.08 0.00 - 0.50 x10*3/uL    Lymphocytes Absolute 1.67 0.80 - 3.00 x10*3/uL    Monocytes Absolute 1.47 (H) 0.05 - 0.80 x10*3/uL    Eosinophils Absolute 0.06 0.00 - 0.40 x10*3/uL    Basophils Absolute 0.04 0.00 - 0.10 x10*3/uL   Comprehensive metabolic panel   Result Value Ref Range    Glucose 311 (H) 74 - 99 mg/dL    Sodium 140 136 - 145 mmol/L    Potassium 3.4 (L) 3.5 - 5.3 mmol/L     Chloride 98 98 - 107 mmol/L    Bicarbonate 25 21 - 32 mmol/L    Anion Gap 20 10 - 20 mmol/L    Urea Nitrogen 23 6 - 23 mg/dL    Creatinine 1.04 0.50 - 1.30 mg/dL    eGFR 76 >60 mL/min/1.73m*2    Calcium 9.7 8.6 - 10.6 mg/dL    Albumin 3.9 3.4 - 5.0 g/dL    Alkaline Phosphatase 67 33 - 136 U/L    Total Protein 6.7 6.4 - 8.2 g/dL    AST 24 9 - 39 U/L    Bilirubin, Total 1.7 (H) 0.0 - 1.2 mg/dL    ALT 16 10 - 52 U/L   Magnesium   Result Value Ref Range    Magnesium 1.85 1.60 - 2.40 mg/dL   Phosphorus   Result Value Ref Range    Phosphorus 3.1 2.5 - 4.9 mg/dL   Calcium, ionized   Result Value Ref Range    POCT Calcium, Ionized 1.12 1.1 - 1.33 mmol/L   B-type natriuretic peptide   Result Value Ref Range     (H) 0 - 99 pg/mL   Urinalysis with Reflex Microscopic and Culture   Result Value Ref Range    Color, Urine Yellow Straw, Yellow    Appearance, Urine Hazy (N) Clear    Specific Gravity, Urine 1.030 1.005 - 1.035    pH, Urine 5.0 5.0, 5.5, 6.0, 6.5, 7.0, 7.5, 8.0    Protein, Urine 100 (2+) (N) NEGATIVE mg/dL    Glucose, Urine >=500 (3+) (A) NEGATIVE mg/dL    Blood, Urine NEGATIVE NEGATIVE    Ketones, Urine 80 (2+) (A) NEGATIVE mg/dL    Bilirubin, Urine NEGATIVE NEGATIVE    Urobilinogen, Urine <2.0 <2.0 mg/dL    Nitrite, Urine NEGATIVE NEGATIVE    Leukocyte Esterase, Urine NEGATIVE NEGATIVE   Urinalysis Microscopic   Result Value Ref Range    WBC, Urine 6-10 (A) 1-5, NONE /HPF    RBC, Urine 3-5 NONE, 1-2, 3-5 /HPF    Squamous Epithelial Cells, Urine NONE Reference range not established. /HPF    Mucus, Urine 1+ Reference range not established. /LPF   Coagulation Screen   Result Value Ref Range    Protime 11.5 9.8 - 12.8 seconds    INR 1.0 0.9 - 1.1    aPTT 29 27 - 38 seconds   CBC and Auto Differential   Result Value Ref Range    WBC 13.7 (H) 4.4 - 11.3 x10*3/uL    nRBC 0.0 0.0 - 0.0 /100 WBCs    RBC 5.00 4.50 - 5.90 x10*6/uL    Hemoglobin 16.2 13.5 - 17.5 g/dL    Hematocrit 45.8 41.0 - 52.0 %    MCV 92 80 - 100 fL     MCH 32.4 26.0 - 34.0 pg    MCHC 35.4 32.0 - 36.0 g/dL    RDW 12.4 11.5 - 14.5 %    Platelets 202 150 - 450 x10*3/uL    MPV 10.7 7.5 - 11.5 fL    Neutrophils % 72.5 40.0 - 80.0 %    Immature Granulocytes %, Automated 0.4 0.0 - 0.9 %    Lymphocytes % 14.8 13.0 - 44.0 %    Monocytes % 10.9 2.0 - 10.0 %    Eosinophils % 1.1 0.0 - 6.0 %    Basophils % 0.3 0.0 - 2.0 %    Neutrophils Absolute 9.93 (H) 1.60 - 5.50 x10*3/uL    Immature Granulocytes Absolute, Automated 0.05 0.00 - 0.50 x10*3/uL    Lymphocytes Absolute 2.03 0.80 - 3.00 x10*3/uL    Monocytes Absolute 1.49 (H) 0.05 - 0.80 x10*3/uL    Eosinophils Absolute 0.15 0.00 - 0.40 x10*3/uL    Basophils Absolute 0.04 0.00 - 0.10 x10*3/uL   Comprehensive Metabolic Panel   Result Value Ref Range    Glucose 281 (H) 74 - 99 mg/dL    Sodium 138 136 - 145 mmol/L    Potassium 3.7 3.5 - 5.3 mmol/L    Chloride 100 98 - 107 mmol/L    Bicarbonate 26 21 - 32 mmol/L    Anion Gap 16 10 - 20 mmol/L    Urea Nitrogen 26 (H) 6 - 23 mg/dL    Creatinine 0.99 0.50 - 1.30 mg/dL    eGFR 80 >60 mL/min/1.73m*2    Calcium 9.5 8.6 - 10.6 mg/dL    Albumin 3.4 3.4 - 5.0 g/dL    Alkaline Phosphatase 59 33 - 136 U/L    Total Protein 6.0 (L) 6.4 - 8.2 g/dL    AST 21 9 - 39 U/L    Bilirubin, Total 1.6 (H) 0.0 - 1.2 mg/dL    ALT 15 10 - 52 U/L   Magnesium   Result Value Ref Range    Magnesium 2.12 1.60 - 2.40 mg/dL   Phosphorus   Result Value Ref Range    Phosphorus 2.5 2.5 - 4.9 mg/dL   Troponin I, High Sensitivity   Result Value Ref Range    Troponin I, High Sensitivity 145 (HH) 0 - 53 ng/L   POCT GLUCOSE   Result Value Ref Range    POCT Glucose 330 (H) 74 - 99 mg/dL           Assessment/Plan   Principal Problem:    Hypertensive emergency  Active Problems:    Type 2 MI (myocardial infarction) (CMS/[facility])    NPH (normal pressure hydrocephalus) (CMS/[facility])    Type 2 diabetes mellitus with hyperglycemia, without long-term current use of insulin (CMS/[facility])    A 74 y/o M patient with PMHx of HTN, DMII per  self-report, who presents for a syncopal event. Hx was difficult to obtain however from ED report it was mentioned that he was found down by his daughter and he was covered in urine, he doesn't recall any traumatic event and describes it as syncope last night. His vitals showed BP in 200s/100s, labs showed elevated trops, imaging showed findings suggestive of NPH. Started on cardene drip and admitted to CICU for further monitoring.      Updates:  - Losartan 25 mg po daily + coreg 6.25 daily  - Hgba1c and if elevated add empa 10 mg po daily  - Wean off cardene drip   - Interrogation of device   - Noted to have red eye with some drainage mainly left eye but also right, he has contact lenses so will start ofloxacin 0.3 4 times daily x5 days     Neuro  Personality changes and wobbly gait per ED reports per daughter  NPH on imaging, NSG on board, recommending OP follow up and no acute surgical intervention  Currently calm AOx2-3   Syphilis, T4, TSH, folate, Drug screen, B12 added   Cognitive eval requested by family, order is in, OT performs it per psych or if not can reach out to geriatrics, will wait on OT to pass by today if not will consult Hanny tomorrow      CV:   # Hypertensive emergency  # Type II demand ischemia   - Syncopal events, high trops, EKG findings suggestive of early repolarization, wide QRS likely from pacing, with more j point elevation.   Admit to CICU  Monitor VS  25 % decrease in first hour achieved, will aim for <160/110 in the first day  Wean off cardene drip  Reported to be on labetalol and ccb per gaurang review, will try to obtain medication list otherwise it would be appropriate to at least start with ACE/ARB for his concurrent DM   TTE Today  Trops trended down  Coreg 6.25 started   Echo : CONCLUSIONS:   1. Left ventricular systolic function is normal with a 55-60% estimated ejection fraction.   2. Poorly visualized anatomical structures due to suboptimal image quality.   3. Abnormal septal  motion consistent with RV pacemaker.      Home medications obtained and are:   - Atorvastatin 40 (restarted)   - ASA 81 mg po daily (restarted)   - Dapagliflozin 10 mg po (restarted)   - Lisinopril 40 mg (siwtched to losartan)   - Chlorthalidone 50 mg po daily (restarted)   - Nifedipine 90 mg po daily (can reinitiate during his stay)   - Doxazosin 4 mg po daily (can reinitiate as needed)   - Rybelsus 7 mg po daily (currently on hold, c/w SSI)   - Glipizide 10 mg po daily (currently on hold, c/w SSI)   - Metforim 1000 BID (currently on hold, c/w SSI)   - Vit c, and Vit D and MVI resumed     # Resp  No acute issues    # GI  No acute issues    # Renal  No acute issues     # MSK  No acute issues    # ID  Elevated WBC trending down from 15 K to 13K, could be 2/2 dehydration/hemoconcentration also reactive to acute state  No fever or focus of infection, U/A with WBC 6-10, +ve glucose and ketones, -ve nitrite and LE less likely to be reliable with no sympotms  Ctm for now       # Endo  DM II, self-reported, no insulin use per patient  ISS and accucheks   Insulin glargine 10 units subcutaneous nightly added   F: prn  E: prn   N: 2 gm sodium   A: PIV   DVT PPx: Avoid (high fall risk)   Code status: Full code  NOK: Daughter Destiny Glaser [phone]            Najma Mcneil MD

## 2023-10-03 NOTE — CARE PLAN
SPT under the direct supervision of PT     Problem: Balance  Goal: STG - Maintains dynamic standing balance with CGA upper extremity support  Outcome: Progressing     Problem: Mobility  Goal: STG - Patient will ambulate >50 ft with CGA and no assistive device   Outcome: Progressing     Problem: Transfers  Goal: STG - Patient will perform bed mobility with the use of bedrails and Jami x1  Outcome: Progressing  Goal: STG - Patient will transfer sit to and from stand with CGA with no assistive device  Outcome: Progressing

## 2023-10-03 NOTE — PROGRESS NOTES
Physical Therapy    Physical Therapy Evaluation    Patient Name: Miguel Angel Santos  MRN: [id number]  Today's Date: 10/3/2023      SPT under the direct supervision of PT    Assessment/Plan   PT Assessment  PT Assessment Results: Decreased strength, Decreased range of motion, Impaired balance, Decreased mobility, Decreased coordination, Decreased cognition  Rehab Prognosis: Good  Evaluation/Treatment Tolerance: Patient tolerated treatment well  Medical Staff Made Aware: Yes  End of Session Communication: Bedside nurse  End of Session Patient Position: Up in chair, Alarm on  IP OR SWING BED PT PLAN  Inpatient or Swing Bed: Inpatient  PT Plan  Treatment/Interventions: Bed mobility, Transfer training, Gait training, Balance training, Neuromuscular re-education, Strengthening, Endurance training, Postural re-education, Therapeutic exercise, Therapeutic activity  PT Plan: Skilled PT  PT Frequency: 4 times per week  PT Discharge Recommendations: High intensity level of continued care  PT Recommended Transfer Status: Assist x2      Subjective     General Visit Information:  General  Reason for Referral: syncopal event, hypertensive emergency, type II demand ischemia, type II MI, NPH  Past Medical History Relevant to Rehab: HTN, DMII  Family/Caregiver Present: Yes  Caregiver Feedback: Daughters present throughout session. They are supportive and pleasant  Patient Position Received: Bed, 4 rail up, Alarm on    Home Living:  Home Living  Type of Home: Apartment  Lives With: Alone  Home Adaptive Equipment: None  Home Layout: One level (elevator to apartment)  Home Access: No concerns  Home Living Comments: girlfriend lives on same floor per pt report; family reports pt does not have help at home    Prior Level of Function:  Prior Function Per Pt/Caregiver Report  Level of [place]: Independent with homemaking with ambulation, Independent with ADLs and functional transfers  Receives Help From: Family, Friends  Vocational:  Retired  Prior Function Comments: Pt had more than two falls in the last month per family report. Pt drives at baseline.    Precautions:  Precautions  Medical Precautions: Cardiac precautions, Fall precautions (SBP </= 140s)    Vital Signs:  Vital Signs  Heart Rate:  (PRE:87 DURING: VSS POST:107)  Resp:  (PRE: 19 DURING: VSS POST: 25)  SpO2:  (PRE: 97% DURING: VSS POST: 99%)  BP:  (PRE: 127/77 supine (HOB elevated) DURIN/77 supine, 109/97 sitting, 129/94 standing POST: 143/84 sitting; orthostatics taken per RN request)  BP Location: Left arm    Objective     Pain:  Pain Assessment  Pain Assessment: 0-10  Pain Score: 0 - No pain    Cognition:  Cognition  Overall Cognitive Status: Impaired  Arousal/Alertness: Delayed responses to stimuli  Orientation Level: Disoriented to time, Disoriented to person (Pt thought it was the 27th. Was able to guess month with increased processing time. Guessed year with options. Pt was able to give his birth month and day but was not able to guess his birth year. Re-oriented to time and self)  Following Commands: Follows one step commands with increased time (>90%)  Attention: Exceptions to WFL  Alternating Attention: Impaired (Re-directed to tasks)  Insight:  (impaired insight into deficits and safety)  Processing Speed: Delayed    General Assessments:      Activity Tolerance  Endurance: Tolerates less than 10 min exercise with changes in vital signs    Sensation  Light Touch: No apparent deficits  Sensation Comment: pt could feel light touch but was not able to distinguish exact location distally    Coordination  Movements are Fluid and Coordinated: No  Lower Body Coordination: shuffled gait with narrow TYREL - unable to adjust with cueing  Trunk Coordination: L lean  Finger to Nose: Bradykinesia, Dysdiadokinesia  Rapid Alternating Movements: Dysdiadokinesia, Bradykinesia  Alternating Toe Taps: Bradykinesia  Heel to [place]: Dysdiadokinesia  Finger to [place]:  Dysdiadokinesia    Postural Control  Trunk Control: L lean    Static Sitting Balance  Static Sitting-Balance Support: Feet supported, No upper extremity supported  Static Sitting-Level of Assistance: Close supervision (occasional CGA to correct lean)  Static Sitting-Comment/Number of Minutes: 10  Dynamic Sitting Balance  Dynamic Sitting-Balance Support: Feet supported, No upper extremity supported    Static Standing Balance  Static Standing-Balance Support: Bilateral upper extremity supported  Static Standing-Level of Assistance: Minimum assistance (x2)  Static Standing-Comment/Number of Minutes: 3  Dynamic Standing Balance  Dynamic Standing-Balance Support: Bilateral upper extremity supported (min-modA x2)    Functional Assessments:  Bed Mobility  Bed Mobility: Yes  Bed Mobility 1  Bed Mobility 1: Supine to sitting  Level of Assistance 1: Maximum assistance, Maximum verbal cues (x1)    Transfers  Transfer: Yes  Transfer 1  Transfer From 1: Sit to  Transfer to 1: Stand  Technique 1: Sit to stand  Transfer Level of Assistance 1: Arm in arm assistance, Maximum verbal cues, Maximum assistance (x2)  Trials/Comments 1: cueing for pt to push through LE  Transfers 2  Transfer From 2: Stand to  Transfer to 2: Sit  Technique 2: Stand to sit  Transfer Level of Assistance 2: Arm in arm assistance, Maximum assistance, Maximum verbal cues (x2)  Transfers 3  Transfer From 3: Sit to  Transfer to 3: Stand  Technique 3: Sit to stand  Transfer Level of Assistance 3: Arm in arm assistance, Moderate assistance, Maximum verbal cues (x2)  Trials/Comments 3: TREATMENT  Transfers 4  Transfer From 4: Stand to  Transfer to 4: Sit  Technique 4: Stand to sit  Transfer Level of Assistance 4: Arm in arm assistance, Moderate assistance, Maximum verbal cues (x2)  Trials/Comments 4: TREATMENT    Ambulation/Gait Training  Ambulation/Gait Training Performed: Yes  Ambulation/Gait Training 1  Surface 1: Level tile  Assistance 1: Arm in arm assistance,  Minimum assistance, Maximum verbal cues (min-modA x2)  Quality of Gait 1: Narrow base of support (shuffled gait, rigid L foot, rigid gait, decreased selene)  Comments/Distance (ft) 1: 15 ft  Ambulation/Gait Training 2  Surface 2: Level tile  Assistance 2: Arm in arm assistance, Minimum assistance, Maximum verbal cues (x2)  Quality of Gait 2: Narrow base of support (shuffled gait, rigid L foot, rigid gait, decreased selene)  Comments/Distance (ft) 2: 3 ft TREATMENT    Outcome Measures:  [facility] Basic Mobility  Turning from your back to your side while in a flat bed without using bedrails: A lot  Moving from lying on your back to sitting on the side of a flat bed without using bedrails: A lot  Moving to and from bed to chair (including a wheelchair): Total  Standing up from a chair using your arms (e.g. wheelchair or bedside chair): Total  To walk in hospital room: Total  Climbing 3-5 steps with railing: Total  Basic Mobility - Total Score: 8    Confusion Assessment Method-ICU (CAM-ICU)  Feature 1: Acute Onset or Fluctuating Course: Positive  Feature 2: Inattention: Positive  Feature 3: Altered Level of Consciousness: Negative  Feature 4: Disorganized Thinking: Positive  Overall CAM-ICU: Positive    FSS-ICU  Ambulation: Walks <50 feet with any assistance x1 or walks any distance with assistance x2 people  Rolling: Total assistance (performs 25% or requires another person)  Sitting: Minimal assistance (performs 75% or more of task)  Transfer Sit-to-Stand: Total assistance (performs 25% or requires another person)  Transfer Supine-to-Sit: Maximal assistance (performs 25% - 49% of task)  Total Score: 9         E = Exercise and Early Mobility  Current Activity: Ambulating in room    Encounter Problems       Encounter Problems (Active)       Balance       STG - Maintains dynamic standing balance with CGA upper extremity support (Progressing)       Start:  10/03/23    Expected End:  10/17/23               Mobility       STG  - Patient will ambulate >50 ft with CGA and no assistive device  (Progressing)       Start:  10/03/23    Expected End:  10/17/23               Transfers       STG - Patient will perform bed mobility with the use of bedrails and Jami x1 (Progressing)       Start:  10/03/23    Expected End:  10/17/23            STG - Patient will transfer sit to and from stand with CGA with no assistive device (Progressing)       Start:  10/03/23    Expected End:  10/17/23                   Education Documentation  Mobility Training, taught by LYNDA Mcmahon at 10/3/2023  3:51 PM.  Learner: Patient  Readiness: Acceptance  Method: Explanation  Response: Needs Reinforcement    Education Comments  No comments found.

## 2023-10-03 NOTE — CARE PLAN
The patient's goals for the shift include      The clinical goals for the shift include a SBP < 160 .  Over the shift, the patient did not make progress toward the following goals. Barriers to progression include medication changes. Recommendations to address these barriers include adding home meds to regimen..

## 2023-10-03 NOTE — SIGNIFICANT EVENT
Sw was able to met with the patient at the bedside. His daughters Stacy Richards , Kyle Dangelo  and his significant other Sammi Knight  were present.  He has one other daughter Destiny Glaser  who was not present. Per the siblings, their sister Destiny should be called first. Her email is [email or web address]. The patient denies any concerns. His daughters asked to speak with me after I completed my assessment.   According to Stacy and Kyle they have not had a relation with their dad, however, they are working together to make sure he gets what he needs. They are concerned about his cognition and whether he has capacity to make his own decisions.  Per his daughter Stacy he does not know her name. They also mentioned that their dad has a legal history. He has been in  federal [employer] for embezzlement.    They are hoping he will be able to go to a SNF until he improves.  They would prefer that he goes to a SNF in the [place] area, which is close to his daughters. HCPOA vs guardianship TBD based on his cognition. Sw will follow up with the family after I hear from the doctors about his cognition.

## 2023-10-03 NOTE — PROGRESS NOTES
Pharmacy Medication History Review    Miguel Angel Santos is a 73 y.o. male admitted for Hypertensive emergency. Pharmacy reviewed the patient's [id number] medications and allergies for accuracy.    The list below reflectives the updated PTA list. Please review each medication in order reconciliation for additional clarification and justification.  Medications Prior to Admission   Medication Sig Dispense Refill Last Dose    atorvastatin (Lipitor) 40 mg tablet Take 1 tablet (40 mg) by mouth once daily.   Past Week    chlorthalidone (Hygroton) 50 mg tablet Take 1 tablet (50 mg) by mouth once daily.   Past Week    doxazosin (Cardura) 4 mg tablet Take 1 tablet (4 mg) by mouth once daily.   Past Week    Farxiga 10 mg Take 1 tablet (10 mg) by mouth once daily with a meal.   More than a month    glipiZIDE (Glucotrol) 10 mg tablet Take 1 tablet (10 mg) by mouth 2 times a day before meals.   Past Week    lisinopril 40 mg tablet Take 1 tablet (40 mg) by mouth once daily.   Past Week    metFORMIN (Glucophage) 1,000 mg tablet Take 1 tablet (1,000 mg) by mouth 2 times a day.   Past Week    metoprolol succinate XL (Toprol-XL) 200 mg 24 hr tablet Take 1 tablet (200 mg) by mouth once daily.   Past Week    NIFEdipine XL 90 mg 24 hr tablet Take 1 tablet (90 mg) by mouth once daily.   Unknown    potassium chloride CR 10 mEq ER tablet Take 1 tablet (10 mEq) by mouth once daily.       Rybelsus 7 mg tablet Take 1 tablet (7 mg) by mouth once daily in the morning. Take before meals.           The list below reflectives the updated allergy list. Please review each documented allergy for additional clarification and justification.  Allergies  Reviewed by Vincent Eldridge DO on 10/2/2023   No Known Allergies     The patient was a great interview who knew medications well. He was stating he could not afford the Farxiga and Rybelus. The ss and [place] were used in determining med list.     Below are additional concerns with the patient's PTA  list.      Crow Mccall, [facility]

## 2023-10-03 NOTE — CARE PLAN
The patient's goals for the shift include      The clinical goals for the shift include progressing    Over the shift, the patient did not make progress toward the following goals. Barriers to progression include ***. Recommendations to address these barriers include ***.

## 2023-10-04 ENCOUNTER — APPOINTMENT (OUTPATIENT)
Dept: CARDIOLOGY | Facility: HOSPITAL | Age: 73
DRG: 305 | End: 2023-10-04
Payer: MEDICARE

## 2023-10-04 LAB
FOLATE SERPL-MCNC: 20.1 NG/ML
GLUCOSE BLD MANUAL STRIP-MCNC: 236 MG/DL (ref 74–99)
GLUCOSE BLD MANUAL STRIP-MCNC: 271 MG/DL (ref 74–99)
GLUCOSE BLD MANUAL STRIP-MCNC: 273 MG/DL (ref 74–99)
GLUCOSE BLD MANUAL STRIP-MCNC: 304 MG/DL (ref 74–99)
VIT B12 SERPL-MCNC: 433 PG/ML (ref 211–911)

## 2023-10-04 PROCEDURE — 2500000001 HC RX 250 WO HCPCS SELF ADMINISTERED DRUGS (ALT 637 FOR MEDICARE OP)

## 2023-10-04 PROCEDURE — 1100000001 HC PRIVATE ROOM DAILY

## 2023-10-04 PROCEDURE — 2500000001 HC RX 250 WO HCPCS SELF ADMINISTERED DRUGS (ALT 637 FOR MEDICARE OP): Performed by: STUDENT IN AN ORGANIZED HEALTH CARE EDUCATION/TRAINING PROGRAM

## 2023-10-04 PROCEDURE — 96372 THER/PROPH/DIAG INJ SC/IM: CPT | Performed by: INTERNAL MEDICINE

## 2023-10-04 PROCEDURE — 96372 THER/PROPH/DIAG INJ SC/IM: CPT

## 2023-10-04 PROCEDURE — 2500000002 HC RX 250 W HCPCS SELF ADMINISTERED DRUGS (ALT 637 FOR MEDICARE OP, ALT 636 FOR OP/ED)

## 2023-10-04 PROCEDURE — 2500000001 HC RX 250 WO HCPCS SELF ADMINISTERED DRUGS (ALT 637 FOR MEDICARE OP): Performed by: INTERNAL MEDICINE

## 2023-10-04 PROCEDURE — 97530 THERAPEUTIC ACTIVITIES: CPT | Mod: GP,CQ

## 2023-10-04 PROCEDURE — 97116 GAIT TRAINING THERAPY: CPT | Mod: GP,CQ

## 2023-10-04 PROCEDURE — 82947 ASSAY GLUCOSE BLOOD QUANT: CPT

## 2023-10-04 PROCEDURE — 2500000002 HC RX 250 W HCPCS SELF ADMINISTERED DRUGS (ALT 637 FOR MEDICARE OP, ALT 636 FOR OP/ED): Performed by: INTERNAL MEDICINE

## 2023-10-04 PROCEDURE — 2500000004 HC RX 250 GENERAL PHARMACY W/ HCPCS (ALT 636 FOR OP/ED): Performed by: INTERNAL MEDICINE

## 2023-10-04 PROCEDURE — 99232 SBSQ HOSP IP/OBS MODERATE 35: CPT | Performed by: INTERNAL MEDICINE

## 2023-10-04 RX ORDER — METFORMIN HYDROCHLORIDE 1000 MG/1
1000 TABLET ORAL 2 TIMES DAILY
Status: DISCONTINUED | OUTPATIENT
Start: 2023-10-04 | End: 2023-10-13 | Stop reason: HOSPADM

## 2023-10-04 RX ORDER — METFORMIN HYDROCHLORIDE 500 MG/1
500 TABLET ORAL 2 TIMES DAILY
Status: DISCONTINUED | OUTPATIENT
Start: 2023-10-04 | End: 2023-10-04

## 2023-10-04 RX ORDER — INSULIN GLARGINE 100 [IU]/ML
15 INJECTION, SOLUTION SUBCUTANEOUS NIGHTLY
Status: DISCONTINUED | OUTPATIENT
Start: 2023-10-04 | End: 2023-10-05

## 2023-10-04 RX ORDER — HYDRALAZINE HYDROCHLORIDE 20 MG/ML
10 INJECTION INTRAMUSCULAR; INTRAVENOUS EVERY 6 HOURS PRN
Status: DISCONTINUED | OUTPATIENT
Start: 2023-10-04 | End: 2023-10-13 | Stop reason: HOSPADM

## 2023-10-04 RX ORDER — ENOXAPARIN SODIUM 100 MG/ML
40 INJECTION SUBCUTANEOUS DAILY
Status: DISCONTINUED | OUTPATIENT
Start: 2023-10-04 | End: 2023-10-13 | Stop reason: HOSPADM

## 2023-10-04 RX ORDER — CLONIDINE HYDROCHLORIDE 0.1 MG/1
0.2 TABLET ORAL ONCE
Status: COMPLETED | OUTPATIENT
Start: 2023-10-04 | End: 2023-10-04

## 2023-10-04 RX ADMIN — CARVEDILOL 12.5 MG: 12.5 TABLET, FILM COATED ORAL at 18:00

## 2023-10-04 RX ADMIN — OXYCODONE HYDROCHLORIDE AND ACETAMINOPHEN 500 MG: 500 TABLET ORAL at 09:36

## 2023-10-04 RX ADMIN — CLONIDINE HYDROCHLORIDE 0.2 MG: 0.1 TABLET ORAL at 04:37

## 2023-10-04 RX ADMIN — METFORMIN HYDROCHLORIDE 1000 MG: 1000 TABLET, FILM COATED ORAL at 22:31

## 2023-10-04 RX ADMIN — OFLOXACIN 1 DROP: 3 SOLUTION OPHTHALMIC at 22:32

## 2023-10-04 RX ADMIN — LOSARTAN POTASSIUM 25 MG: 25 TABLET, FILM COATED ORAL at 09:36

## 2023-10-04 RX ADMIN — Medication 1 TABLET: at 09:36

## 2023-10-04 RX ADMIN — ASPIRIN 81 MG: 81 TABLET, COATED ORAL at 09:36

## 2023-10-04 RX ADMIN — INSULIN LISPRO 4 UNITS: 100 INJECTION, SOLUTION INTRAVENOUS; SUBCUTANEOUS at 13:17

## 2023-10-04 RX ADMIN — INSULIN LISPRO 8 UNITS: 100 INJECTION, SOLUTION INTRAVENOUS; SUBCUTANEOUS at 18:50

## 2023-10-04 RX ADMIN — Medication 25 MCG: at 09:36

## 2023-10-04 RX ADMIN — DAPAGLIFLOZIN 10 MG: 10 TABLET, FILM COATED ORAL at 09:36

## 2023-10-04 RX ADMIN — OFLOXACIN 1 DROP: 3 SOLUTION OPHTHALMIC at 16:43

## 2023-10-04 RX ADMIN — INSULIN LISPRO 6 UNITS: 100 INJECTION, SOLUTION INTRAVENOUS; SUBCUTANEOUS at 09:42

## 2023-10-04 RX ADMIN — CHLORTHALIDONE 50 MG: 25 TABLET ORAL at 09:37

## 2023-10-04 RX ADMIN — CARVEDILOL 12.5 MG: 12.5 TABLET, FILM COATED ORAL at 06:44

## 2023-10-04 RX ADMIN — INSULIN GLARGINE 15 UNITS: 100 INJECTION, SOLUTION SUBCUTANEOUS at 22:31

## 2023-10-04 RX ADMIN — ATORVASTATIN CALCIUM 40 MG: 40 TABLET, FILM COATED ORAL at 09:36

## 2023-10-04 RX ADMIN — ENOXAPARIN SODIUM 40 MG: 40 INJECTION SUBCUTANEOUS at 11:24

## 2023-10-04 ASSESSMENT — COGNITIVE AND FUNCTIONAL STATUS - GENERAL
STANDING UP FROM CHAIR USING ARMS: A LOT
MOVING FROM LYING ON BACK TO SITTING ON SIDE OF FLAT BED WITH BEDRAILS: A LITTLE
DAILY ACTIVITIY SCORE: 19
WALKING IN HOSPITAL ROOM: A LOT
WALKING IN HOSPITAL ROOM: A LOT
DRESSING REGULAR UPPER BODY CLOTHING: A LITTLE
MOBILITY SCORE: 12
DRESSING REGULAR LOWER BODY CLOTHING: A LITTLE
TURNING FROM BACK TO SIDE WHILE IN FLAT BAD: A LOT
HELP NEEDED FOR BATHING: A LITTLE
TURNING FROM BACK TO SIDE WHILE IN FLAT BAD: A LOT
STANDING UP FROM CHAIR USING ARMS: A LOT
MOVING TO AND FROM BED TO CHAIR: A LOT
MOVING TO AND FROM BED TO CHAIR: A LOT
TOILETING: A LITTLE
CLIMB 3 TO 5 STEPS WITH RAILING: TOTAL
MOBILITY SCORE: 12
CLIMB 3 TO 5 STEPS WITH RAILING: TOTAL
MOVING FROM LYING ON BACK TO SITTING ON SIDE OF FLAT BED WITH BEDRAILS: A LITTLE
PERSONAL GROOMING: A LITTLE

## 2023-10-04 ASSESSMENT — PAIN SCALES - GENERAL: PAINLEVEL_OUTOF10: 0 - NO PAIN

## 2023-10-04 ASSESSMENT — PAIN - FUNCTIONAL ASSESSMENT: PAIN_FUNCTIONAL_ASSESSMENT: 0-10

## 2023-10-04 NOTE — PROGRESS NOTES
"Miguel Angel Santos is a 73 y.o. male on day 2 of admission presenting after being found down and was diagnosed with hypertensive emergency.    Subjective   Lying in bed. No distress.          Objective     General: Lying in bed without distress.  Cooperative.  Skin: No rashes ulcerations.  HEENT: Sclera is white.  Mucous membranes moist.  Neck: Supple.  No JVD.  Cardiac: Regular rate and rhythm, S1/S2 distant.  Lungs: Clear to auscultation bilaterally, no wheezing or crackles, no accessory muscle use at rest.  Abdomen: Soft, nontender, nondistended, BS +  Extremities: No cyanosis.  No lower extremity edema.  Neurologic: Alert and oriented to self, partially to place, partially to time, not situation.  No focal deficits.  Patient has a poor memory recall.  Psychiatric: Appropriate mood and behavior.  Currently no agitation.    Last Recorded Vitals  Blood pressure 120/66, pulse 76, temperature 36.9 °C (98.4 °F), resp. rate 16, height 1.93 m (6' 3.98\"), weight 92.1 kg (203 lb 0.7 oz), SpO2 100 %.  On room air.    Intake/Output last 3 Shifts:  I/O last 3 completed shifts:  In: 2809.1 (30.5 mL/kg) [P.O.:1380; I.V.:1029.1 (11.2 mL/kg); IV Piggyback:400]  Out: 2400 (26.1 mL/kg) [Urine:2400 (0.7 mL/kg/hr)]  Weight: 92.1 kg     Relevant Results  ascorbic acid, 500 mg, oral, Daily  aspirin, 81 mg, oral, Daily  atorvastatin, 40 mg, oral, Daily  carvedilol, 12.5 mg, oral, q12h  chlorthalidone, 50 mg, oral, Daily  cholecalciferol, 25 mcg, oral, Daily  dapagliflozin propanediol, 10 mg, oral, Daily  insulin glargine, 10 Units, subcutaneous, Nightly  insulin lispro, 0-10 Units, subcutaneous, TID with meals  losartan, 25 mg, oral, Daily  multivitamin with minerals, 1 tablet, oral, Daily  ofloxacin, 1 drop, Both Eyes, 4x daily  perflutren protein A microsphere, 0.5 mL, intravenous, Once in imaging  sulfur hexafluoride microsphr, 2 mL, intravenous, Once in imaging           PRN medications: dextrose 10 % in water (D10W), dextrose, " glucagon, hydrALAZINE     Hospital course:  A 72 y/o M patient with PMHx of HTN, DMII per self-report, who presents for a syncopal event. Hx was difficult to obtain however from ED report it was mentioned that he was found down by his daughter and he was covered in urine, he doesn't recall any traumatic event and described it as syncope. His vitals showed BP in 200s/100s, labs showed elevated trops, imaging showed findings suggestive of NPH. Started on cardene drip and admitted to CICU for further monitoring.  Patient improved, weaned off Cardene drip, then moved to medical floor on 10/3.    Assessment/Plan     Syncope  -Currently suspected to be secondary to hypertensive emergency.  -Check orthostatic vitals.    -Transthoracic echocardiogram done showing EF of 55 to 60% but suboptimal image quality.  No mention of significant valvular issues.    Hypertensive emergency  -Blood pressure significantly elevated on admission, restarted on some of his home medications and has since improved.  -Currently on Coreg 12.5 mg twice a day, chlorthalidone 50 mg daily, dapagliflozin 10 mg daily, losartan 25 mg daily.  -Current systolic blood pressure 120s.  Continue to monitor.  -We will order device interrogation of PPM that was placed in 2018.    Type II demand ischemia  -Elevated high-sensitivity troponins on admission but this is likely secondary to hypertensive emergency.  Troponins down trended since admission.    Diabetes mellitus type 2  -Patient reports no medications at home.  Reportedly on diet control only.  -Check hemoglobin A1c.  So far poorly controlled.  Patient reports polyuria and polydipsia at home.  -This hospitalization glucose not well controlled.  -Currently on Lantus 10 units at bedtime, mild sliding scale insulin.  Increase basal insulin to 15 units at bedtime.  Start oral metformin 500 mg twice a day.    Neurocognitive disorder, suspected underlying dementia  -OT has been requested for neurocognitive  assessment.  -Patient currently alert, oriented to self, partially to place, partially to time, initially appeared to understand his situation but has poor memory recall and could not recall why he is hospitalized when asked.    Generalized weakness/physical deconditioning  -PT has recommended high intensity acute rehab.  Discussed with TCC who will contact family.  Waiting for OT evaluation.    DVT prophylaxis  -Subcu Lovenox 40 mg daily.      Lawanda Puente MD

## 2023-10-04 NOTE — CARE PLAN
Problem: Fall/Injury  Goal: Verbalize understanding of personal risk factors for fall in the hospital  10/4/2023 1758 by Jayshree Negron RN  Outcome: Progressing  10/4/2023 1751 by Jayshree Negron RN  Outcome: Progressing  Goal: Verbalize understanding of risk factor reduction measures to prevent injury from fall in the home  10/4/2023 1758 by Jayshree Negron RN  Outcome: Progressing  10/4/2023 1751 by Jayshree Negron RN  Outcome: Progressing  Goal: Use assistive devices by end of the shift  10/4/2023 1758 by Jayshree Negron RN  Outcome: Progressing  10/4/2023 1751 by Jayshree Negron RN  Outcome: Progressing  Goal: Pace activities to prevent fatigue by end of the shift  10/4/2023 1758 by Jayshree Negron RN  Outcome: Progressing  10/4/2023 1751 by Jayshree Negron RN  Outcome: Progressing   The patient's goals for the shift include      The clinical goals for the shift include free from falls

## 2023-10-04 NOTE — CARE PLAN
The patient's goals for the shift include      The clinical goals for the shift include progressing      Problem: Fall/Injury  Goal: Verbalize understanding of personal risk factors for fall in the hospital  Outcome: Progressing  Goal: Verbalize understanding of risk factor reduction measures to prevent injury from fall in the home  Outcome: Progressing  Goal: Use assistive devices by end of the shift  Outcome: Progressing  Goal: Pace activities to prevent fatigue by end of the shift  Outcome: Progressing

## 2023-10-04 NOTE — PROGRESS NOTES
Spoke with patient's daughter (Destiny) regarding high intensity recommendation from PT. Discussed differences between AR and SNF. Daughter concerned that patient may need long term placement after AR or SNF. Discussed that patient can go to long term facility after rehab. Daughter in agreement to AR. Provided list of facilities. Daughter requested referrals be made to [facility], [facility], and [facility] with [facility] or [facility] being first choice. Referrals placed. Daughter requested doctor to contact her by cell when he can to discuss her concerns with her father's change in mental status from his baseline. Notified Dr. Puente who stated he would call her when able. Agata Soto RN, T[facility]

## 2023-10-04 NOTE — CARE PLAN
Problem: Balance  Goal: STG - Maintains dynamic standing balance with CGA upper extremity support  Outcome: Progressing     Problem: Mobility  Goal: STG - Patient will ambulate >50 ft with CGA and no assistive device   Outcome: Progressing     Problem: Transfers  Goal: STG - Patient will perform bed mobility with the use of bedrails and Jami x1  Outcome: Progressing  Goal: STG - Patient will transfer sit to and from stand with CGA with no assistive device  Outcome: Progressing

## 2023-10-05 LAB
ALBUMIN SERPL BCP-MCNC: 3.5 G/DL (ref 3.4–5)
ANION GAP SERPL CALC-SCNC: 16 MMOL/L (ref 10–20)
BASOPHILS # BLD AUTO: 0.03 X10*3/UL (ref 0–0.1)
BASOPHILS NFR BLD AUTO: 0.3 %
BUN SERPL-MCNC: 44 MG/DL (ref 6–23)
CALCIUM SERPL-MCNC: 9.5 MG/DL (ref 8.6–10.6)
CHLORIDE SERPL-SCNC: 98 MMOL/L (ref 98–107)
CO2 SERPL-SCNC: 27 MMOL/L (ref 21–32)
CREAT SERPL-MCNC: 1.49 MG/DL (ref 0.5–1.3)
EOSINOPHIL # BLD AUTO: 0.28 X10*3/UL (ref 0–0.4)
EOSINOPHIL NFR BLD AUTO: 3.1 %
ERYTHROCYTE [DISTWIDTH] IN BLOOD BY AUTOMATED COUNT: 13.4 % (ref 11.5–14.5)
EST. AVERAGE GLUCOSE BLD GHB EST-MCNC: 260 MG/DL
FOLATE SERPL-MCNC: 17.6 NG/ML
GFR SERPL CREATININE-BSD FRML MDRD: 49 ML/MIN/1.73M*2
GLUCOSE BLD MANUAL STRIP-MCNC: 219 MG/DL (ref 74–99)
GLUCOSE BLD MANUAL STRIP-MCNC: 223 MG/DL (ref 74–99)
GLUCOSE BLD MANUAL STRIP-MCNC: 226 MG/DL (ref 74–99)
GLUCOSE BLD MANUAL STRIP-MCNC: 253 MG/DL (ref 74–99)
GLUCOSE SERPL-MCNC: 233 MG/DL (ref 74–99)
HBA1C MFR BLD: 10.7 %
HCT VFR BLD AUTO: 48.1 % (ref 41–52)
HGB BLD-MCNC: 16 G/DL (ref 13.5–17.5)
IMM GRANULOCYTES # BLD AUTO: 0.06 X10*3/UL (ref 0–0.5)
IMM GRANULOCYTES NFR BLD AUTO: 0.7 % (ref 0–0.9)
LYMPHOCYTES # BLD AUTO: 2.61 X10*3/UL (ref 0.8–3)
LYMPHOCYTES NFR BLD AUTO: 29.1 %
MAGNESIUM SERPL-MCNC: 2.32 MG/DL (ref 1.6–2.4)
MCH RBC QN AUTO: 32.1 PG (ref 26–34)
MCHC RBC AUTO-ENTMCNC: 33.3 G/DL (ref 32–36)
MCV RBC AUTO: 97 FL (ref 80–100)
MONOCYTES # BLD AUTO: 0.93 X10*3/UL (ref 0.05–0.8)
MONOCYTES NFR BLD AUTO: 10.4 %
NEUTROPHILS # BLD AUTO: 5.06 X10*3/UL (ref 1.6–5.5)
NEUTROPHILS NFR BLD AUTO: 56.4 %
NRBC BLD-RTO: 0 /100 WBCS (ref 0–0)
PHOSPHATE SERPL-MCNC: 5.7 MG/DL (ref 2.5–4.9)
PLATELET # BLD AUTO: 205 X10*3/UL (ref 150–450)
PMV BLD AUTO: 10.9 FL (ref 7.5–11.5)
POTASSIUM SERPL-SCNC: 4.1 MMOL/L (ref 3.5–5.3)
RBC # BLD AUTO: 4.98 X10*6/UL (ref 4.5–5.9)
SODIUM SERPL-SCNC: 137 MMOL/L (ref 136–145)
T4 FREE SERPL-MCNC: 1.1 NG/DL (ref 0.78–1.48)
TSH SERPL-ACNC: 1.07 MIU/L (ref 0.44–3.98)
TSH SERPL-ACNC: 2.04 MIU/L (ref 0.44–3.98)
WBC # BLD AUTO: 9 X10*3/UL (ref 4.4–11.3)

## 2023-10-05 PROCEDURE — 97165 OT EVAL LOW COMPLEX 30 MIN: CPT | Mod: GO

## 2023-10-05 PROCEDURE — 96372 THER/PROPH/DIAG INJ SC/IM: CPT | Performed by: INTERNAL MEDICINE

## 2023-10-05 PROCEDURE — 70553 MRI BRAIN STEM W/O & W/DYE: CPT | Performed by: RADIOLOGY

## 2023-10-05 PROCEDURE — 82947 ASSAY GLUCOSE BLOOD QUANT: CPT

## 2023-10-05 PROCEDURE — 2500000001 HC RX 250 WO HCPCS SELF ADMINISTERED DRUGS (ALT 637 FOR MEDICARE OP)

## 2023-10-05 PROCEDURE — 2500000001 HC RX 250 WO HCPCS SELF ADMINISTERED DRUGS (ALT 637 FOR MEDICARE OP): Performed by: INTERNAL MEDICINE

## 2023-10-05 PROCEDURE — 83735 ASSAY OF MAGNESIUM: CPT

## 2023-10-05 PROCEDURE — 99254 IP/OBS CNSLTJ NEW/EST MOD 60: CPT

## 2023-10-05 PROCEDURE — 85025 COMPLETE CBC W/AUTO DIFF WBC: CPT

## 2023-10-05 PROCEDURE — 80069 RENAL FUNCTION PANEL: CPT

## 2023-10-05 PROCEDURE — 84443 ASSAY THYROID STIM HORMONE: CPT | Performed by: INTERNAL MEDICINE

## 2023-10-05 PROCEDURE — 2500000002 HC RX 250 W HCPCS SELF ADMINISTERED DRUGS (ALT 637 FOR MEDICARE OP, ALT 636 FOR OP/ED): Performed by: INTERNAL MEDICINE

## 2023-10-05 PROCEDURE — 96372 THER/PROPH/DIAG INJ SC/IM: CPT

## 2023-10-05 PROCEDURE — 99233 SBSQ HOSP IP/OBS HIGH 50: CPT | Performed by: INTERNAL MEDICINE

## 2023-10-05 PROCEDURE — 2500000002 HC RX 250 W HCPCS SELF ADMINISTERED DRUGS (ALT 637 FOR MEDICARE OP, ALT 636 FOR OP/ED)

## 2023-10-05 PROCEDURE — 2500000004 HC RX 250 GENERAL PHARMACY W/ HCPCS (ALT 636 FOR OP/ED): Performed by: INTERNAL MEDICINE

## 2023-10-05 PROCEDURE — 36415 COLL VENOUS BLD VENIPUNCTURE: CPT | Performed by: INTERNAL MEDICINE

## 2023-10-05 PROCEDURE — 1100000001 HC PRIVATE ROOM DAILY

## 2023-10-05 PROCEDURE — 36415 COLL VENOUS BLD VENIPUNCTURE: CPT

## 2023-10-05 PROCEDURE — 2500000001 HC RX 250 WO HCPCS SELF ADMINISTERED DRUGS (ALT 637 FOR MEDICARE OP): Performed by: STUDENT IN AN ORGANIZED HEALTH CARE EDUCATION/TRAINING PROGRAM

## 2023-10-05 PROCEDURE — 82746 ASSAY OF FOLIC ACID SERUM: CPT | Performed by: INTERNAL MEDICINE

## 2023-10-05 RX ORDER — INSULIN GLARGINE 100 [IU]/ML
20 INJECTION, SOLUTION SUBCUTANEOUS NIGHTLY
Status: DISCONTINUED | OUTPATIENT
Start: 2023-10-05 | End: 2023-10-13 | Stop reason: HOSPADM

## 2023-10-05 RX ORDER — CHLORTHALIDONE 25 MG/1
25 TABLET ORAL DAILY
Status: DISCONTINUED | OUTPATIENT
Start: 2023-10-06 | End: 2023-10-05

## 2023-10-05 RX ADMIN — OFLOXACIN 1 DROP: 3 SOLUTION OPHTHALMIC at 06:33

## 2023-10-05 RX ADMIN — METFORMIN HYDROCHLORIDE 1000 MG: 1000 TABLET, FILM COATED ORAL at 08:47

## 2023-10-05 RX ADMIN — DAPAGLIFLOZIN 10 MG: 10 TABLET, FILM COATED ORAL at 08:48

## 2023-10-05 RX ADMIN — OFLOXACIN 1 DROP: 3 SOLUTION OPHTHALMIC at 13:02

## 2023-10-05 RX ADMIN — CARVEDILOL 12.5 MG: 12.5 TABLET, FILM COATED ORAL at 06:34

## 2023-10-05 RX ADMIN — ATORVASTATIN CALCIUM 40 MG: 40 TABLET, FILM COATED ORAL at 08:47

## 2023-10-05 RX ADMIN — OFLOXACIN 1 DROP: 3 SOLUTION OPHTHALMIC at 21:37

## 2023-10-05 RX ADMIN — INSULIN GLARGINE 20 UNITS: 100 INJECTION, SOLUTION SUBCUTANEOUS at 21:36

## 2023-10-05 RX ADMIN — Medication 25 MCG: at 08:47

## 2023-10-05 RX ADMIN — Medication 1 TABLET: at 08:47

## 2023-10-05 RX ADMIN — ASPIRIN 81 MG: 81 TABLET, COATED ORAL at 08:47

## 2023-10-05 RX ADMIN — INSULIN LISPRO 4 UNITS: 100 INJECTION, SOLUTION INTRAVENOUS; SUBCUTANEOUS at 17:32

## 2023-10-05 RX ADMIN — INSULIN LISPRO 4 UNITS: 100 INJECTION, SOLUTION INTRAVENOUS; SUBCUTANEOUS at 13:02

## 2023-10-05 RX ADMIN — OFLOXACIN 1 DROP: 3 SOLUTION OPHTHALMIC at 17:32

## 2023-10-05 RX ADMIN — CARVEDILOL 12.5 MG: 12.5 TABLET, FILM COATED ORAL at 18:00

## 2023-10-05 RX ADMIN — OXYCODONE HYDROCHLORIDE AND ACETAMINOPHEN 500 MG: 500 TABLET ORAL at 08:47

## 2023-10-05 RX ADMIN — ENOXAPARIN SODIUM 40 MG: 40 INJECTION SUBCUTANEOUS at 08:48

## 2023-10-05 RX ADMIN — LOSARTAN POTASSIUM 25 MG: 25 TABLET, FILM COATED ORAL at 08:47

## 2023-10-05 RX ADMIN — INSULIN LISPRO 4 UNITS: 100 INJECTION, SOLUTION INTRAVENOUS; SUBCUTANEOUS at 08:48

## 2023-10-05 ASSESSMENT — COGNITIVE AND FUNCTIONAL STATUS - GENERAL
DRESSING REGULAR LOWER BODY CLOTHING: A LOT
MOVING FROM LYING ON BACK TO SITTING ON SIDE OF FLAT BED WITH BEDRAILS: A LITTLE
EATING MEALS: A LITTLE
DAILY ACTIVITIY SCORE: 15
EATING MEALS: A LITTLE
TURNING FROM BACK TO SIDE WHILE IN FLAT BAD: A LITTLE
PERSONAL GROOMING: A LITTLE
TOILETING: A LOT
DRESSING REGULAR UPPER BODY CLOTHING: A LOT
MOVING TO AND FROM BED TO CHAIR: A LITTLE
CLIMB 3 TO 5 STEPS WITH RAILING: TOTAL
HELP NEEDED FOR BATHING: TOTAL
WALKING IN HOSPITAL ROOM: A LITTLE
DRESSING REGULAR UPPER BODY CLOTHING: A LITTLE
DRESSING REGULAR LOWER BODY CLOTHING: A LOT
DAILY ACTIVITIY SCORE: 14
HELP NEEDED FOR BATHING: A LOT
STANDING UP FROM CHAIR USING ARMS: A LITTLE
TOILETING: A LOT
MOBILITY SCORE: 16

## 2023-10-05 ASSESSMENT — PAIN SCALES - GENERAL: PAINLEVEL_OUTOF10: 0 - NO PAIN

## 2023-10-05 ASSESSMENT — ACTIVITIES OF DAILY LIVING (ADL): BATHING_ASSISTANCE: MODERATE

## 2023-10-05 NOTE — PROGRESS NOTES
Occupational Therapy    Evaluation    Patient Name: Miguel Angel Santos  MRN: [id number]  Today's Date: 10/5/2023  Time Calculation  Start Time: 1008  Stop Time: 1037  Time Calculation (min): 29 min    Assessment  IP OT Assessment  Evaluation/Treatment Tolerance: Patient tolerated treatment well  Medical Staff Made Aware: Yes  Plan:  Treatment Interventions: ADL retraining, Functional transfer training, Endurance training, Cognitive reorientation (trunk balance retraining)  OT Frequency: 3 times per week  OT Discharge Recommendations: Moderate intensity level of continued care  Equipment Recommended upon Discharge: Wheeled walker, Bedside commode  OT Recommended Transfer Status: Moderate assist    Subjective   Current Problem:  1. Hypertensive emergency  Transthoracic Echo (TTE) Complete    Transthoracic Echo (TTE) Complete    Cardiac device check - Inpatient    Cardiac device check - Inpatient    CANCELED: Transthoracic Echo (TTE) Complete    CANCELED: Transthoracic Echo (TTE) Complete      2. Type 2 MI (myocardial infarction) (CMS/HCC)  Transthoracic Echo (TTE) Complete    Transthoracic Echo (TTE) Complete      3. Cardiomyopathy as manifestation of underlying disease (CMS/HCC)  Cardiac device check - Inpatient    Cardiac device check - Inpatient    CANCELED: Transthoracic Echo (TTE) Complete    CANCELED: Transthoracic Echo (TTE) Complete      4. Syncope, unspecified syncope type  Cardiac device check - Inpatient    Cardiac device check - Inpatient    CANCELED: Cardiac Device Check - Inpatient    CANCELED: Cardiac Device Check - Inpatient        General:  General  Reason for Referral: presents for a syncopal event  Past Medical History Relevant to Rehab: 73 y.o. male presenting with PMHx of HTN, DMII  Prior to Session Communication: Bedside nurse  Patient Position Received: Bed, 2 rail up  Preferred Learning Style: verbal  Precautions:  Precautions Comment: high fall risk  Vital Signs:  Heart Rate: 71  SpO2: 97 %  BP: (!)  162/101  BP Location: Left arm  BP Method: Automatic  Patient Position: Lying  Pain:  Pain Assessment  Pain Score: 0 - No pain    Objective   Cognition:  Orientation Level: Oriented X4  Following Commands: Follows one step commands with repetition  Attention: Exceptions to WFL  Sustained Attention:  (mod verbal cues to attend and stay on task through completion)           Home Living:  Type of Home: Apartment  Lives With: Alone  Home Access: Level entry  Bathroom Shower/Tub: Tub/shower unit  Bathroom Toilet: Standard  Bathroom Equipment: Grab bars in shower, Hand-held shower hose, Grab bars around toilet   Prior Function:  Level of [place]: Independent with ADLs and functional transfers, Independent with homemaking with ambulation  Hand Dominance: Right  IADL History:  Homemaking Responsibilities: Yes  Meal Prep Responsibility: Primary  Laundry Responsibility: Primary  Cleaning Responsibility: Primary  Bill Paying/Finance Responsibility: Primary  Shopping Responsibility: Primary  Occupation: Retired  Type of Occupation:   Leisure and Hobbies: tv/sports  ADL:  Eating Assistance: Stand by  Eating Deficit: Other (Comment) (decreased attention span)  Grooming Deficit: Setup  Bathing Assistance: Moderate  UE Dressing Deficit: Setup  LE Dressing Assistance: Moderate  Toileting Assistance with Device: Maximal  Activity Tolerance:  Endurance: Endurance does not limit participation in activity  Bed Mobility/Transfers: Bed Mobility  Bed Mobility: Yes  Bed Mobility 1  Bed Mobility 1: Supine to sitting  Level of Assistance 1: Moderate assistance  Bed Mobility Comments 1: mod vc and assistance for safe hand placement and tf initiation   and Transfers  Transfer: Yes  Transfer 1  Transfer From 1: Sit to  Transfer to 1: Stand  Technique 1: Sit to stand  Transfer Device 1: Walker, Gait belt  Transfers 2  Transfer From 2: Stand to (wh w > bsc)  Transfer to 2: Sit  Transfer Level of Assistance 2: Moderate  assistance  Transfers 3  Transfer From 3:  (wh w > bsc)  Modalities:     IADL's:   Homemaking Responsibilities: Yes  Meal Prep Responsibility: Primary  Laundry Responsibility: Primary  Cleaning Responsibility: Primary  Bill Paying/Finance Responsibility: Primary  Shopping Responsibility: Primary  Occupation: Retired  Type of Occupation:   Leisure and Hobbies: tv/sports    Sensation:  Light Touch: No apparent deficits  Strength:     Perception:     Coordination:  Trunk Coordination: R/lateral sway in unsupported sitting (mod vc to return to and maintain midline)   Hand Function:  Hand Function  Gross Grasp: Functional  Extremities: RUE   RUE : Within Functional Limits and LUE   LUE: Within Functional Limits      Outcome Measures: [facility] Daily Activity  Putting on and taking off regular lower body clothing: A lot  Bathing (including washing, rinsing, drying): A lot  Putting on and taking off regular upper body clothing: A little  Toileting, which includes using toilet, bedpan or urinal: A lot  Taking care of personal grooming such as brushing teeth: A little  Eating Meals: A little  Daily Activity - Total Score: 15      Education Documentation  Mobility Training, taught by Kala Chou OT at 10/5/2023  1:39 PM.  Learner: Patient  Readiness: Eager  Method: Explanation  Response: Demonstrated Understanding    Education Comments  No comments found.      Goals:   Encounter Problems       Encounter Problems (Active)       ADLs       Patient will perform UB and LB bathing with supervision level of assistance and extended tub bench and long-handled sponge. (Progressing)       Start:  10/05/23    Expected End:  10/19/23            Patient with complete upper body dressing with modified independent level of assistance donning and doffing pullover shirt with no adaptive equipment while edge of bed  (Progressing)       Start:  10/05/23    Expected End:  10/19/23            Patient with complete lower body  dressing with modified independent level of assistance donning and doffing all LE clothes  with reacher, sock-aid, dressing stick , and elastic shoe laces while edge of bed  (Progressing)       Start:  10/05/23    Expected End:  10/19/23            Patient will complete daily grooming tasks brushing teeth and shaving with mod I level of assistance and PRN adaptive equipment while edge of bed . (Progressing)       Start:  10/05/23    Expected End:  10/19/23            Patient will complete toileting including hygiene clothing management/hygiene with SBA level of assistance and raised toilet seat. (Progressing)       Start:  10/05/23    Expected End:  10/19/23               BALANCE: AS TOLERATED PATIENT WILL PARTICIPATE IN STATIC AND DYNAMIC SIT/STANDING BALANCE RETRAINING TO INCREASE SAFETY FOR I/ADLS AND FXAL TF.          Balance       STG - Maintains dynamic standing balance with CGA upper extremity support (Progressing)       Start:  10/03/23    Expected End:  10/17/23               COGNITION/SAFETY       Patient will follow  Simple GM commands to allow improved ADL performance. (Progressing)       Start:  10/05/23    Expected End:  10/19/23               Mobility       STG - Patient will ambulate >50 ft with CGA and no assistive device  (Progressing)       Start:  10/03/23    Expected End:  10/17/23               TRANSFERS       Patient will perform bed mobility modified independent level of assistance and bed rails in order to improve safety and independence with mobility (Progressing)       Start:  10/05/23    Expected End:  10/19/23            Patient will complete functional transfer with least restrictive device with modified independent level of assistance. (Progressing)       Start:  10/05/23    Expected End:  10/19/23               Transfers       STG - Patient will perform bed mobility with the use of bedrails and Jami x1 (Progressing)       Start:  10/03/23    Expected End:  10/17/23            STG -  Patient will transfer sit to and from stand with CGA with no assistive device (Progressing)       Start:  10/03/23    Expected End:  10/17/23

## 2023-10-05 NOTE — CARE PLAN
Problem: COGNITION/SAFETY  Goal: Patient will follow  Simple GM commands to allow improved ADL performance.  Outcome: Progressing     Problem: ADLs  Goal: Patient will perform UB and LB bathing with supervision level of assistance and extended tub bench and long-handled sponge.  Outcome: Progressing     Problem: ADLs  Goal: Patient with complete upper body dressing with modified independent level of assistance donning and doffing pullover shirt with no adaptive equipment while edge of bed   Outcome: Progressing     Problem: ADLs  Goal: Patient with complete lower body dressing with modified independent level of assistance donning and doffing all LE clothes  with reacher, sock-aid, dressing stick , and elastic shoe laces while edge of bed   Outcome: Progressing     Problem: ADLs  Goal: Patient will complete daily grooming tasks brushing teeth and shaving with mod I level of assistance and PRN adaptive equipment while edge of bed .  Outcome: Progressing     Problem: ADLs  Goal: Patient will complete toileting including hygiene clothing management/hygiene with SBA level of assistance and raised toilet seat.  Outcome: Progressing     Problem: TRANSFERS  Goal: Patient will perform bed mobility modified independent level of assistance and bed rails in order to improve safety and independence with mobility  Outcome: Progressing

## 2023-10-05 NOTE — CONSULTS
Reason For Consult  Progressive neurocognitive decline x1 year with quick deterioration in last 2 weeks    History Of Present Illness  Miguel Angel Santos is a 73 y.o. male with history of HTN and T2M, who presented to [facility] s/p syncopal event, where he was found down by daughter and covered in urine, likely 2/2 hypertensive emergency with bp 200s/100s. Neurology was consulted for neurocognitive decline with quick deterioration in the last 2 weeks.    On interview, patient stated that he passed out for the first time and is starting to remember bits and pieces of his presentation based on what others have told him. He states that he's getting irritated because he feels like people aren't telling him the truth. He denies reports of issues with memories or problems with walking (though he stated he did have problems with walking when team reassessed later in the morning). He states that he's able to complete ADLs independently (showering, bathing, paying bills, buying groceries).     Past Medical History  He has a past medical history of Diabetes mellitus (CMS/[facility]).    Surgical History  He has a past surgical history that includes Cardiac surgery.     Social History  - Smokes 1 cigar a week  - Denies alcohol use  - Denies illicit substances     Allergies  Patient has no known allergies.    Physical Exam  GENERAL APPEARANCE:  No distress, alert, interactive and cooperative.     MENTAL STATE:   Orientation was normal to time, place, person, and situation. Notably has word-finding difficulties and appears to compensate for memory. Remembered 1/3 words with clue. Unable to complete serial 7's, serial 3's, or saying the days of the week backwards (despite majoring in math). General fund of knowledge intact. Identified three items.    CRANIAL NERVES:   CN 3, 4, 6   Pupils round, 4 mm in diameter, equally reactive to light. Lids symmetric; no ptosis. EOMs normal alignment, full range with normal saccades, pursuit and  "convergence.  CN 5   Facial sensation intact bilaterally.   CN 7   Normal and symmetric facial strength. Nasolabial folds symmetric.   CN 8   Hearing intact to conversation.  CN 9/10  Palate elevates symmetrically.   CN 11   Normal strength of shoulder shrug and neck turning.   CN 12   Tongue midline, with normal bulk and strength; no fasciculations.     MOTOR:   Muscle bulk and tone were normal in both upper and lower extremities.   No fasciculations, tremor or other abnormal movements were present.                         R          L  Deltoids        5          5  Biceps          5          5  Triceps          5          5  Wrist Flex      -          -  Wrist Ext       -          -    Hip Flex         5          5  Knee Flex      5          5  Knee Ext        5          5  Dorsiflex         5          5  Plantarflex      5          5    REFLEXES:                       R          L  BR:               2         2  Biceps:         2          2  Triceps:        2          2  Knee:            3          3  Ankle:          2          2    Babinski: toes downgoing to plantar stimulation.    SENSORY:   In both upper and lower extremities, sensation was intact to light touch    COORDINATION:    In both upper extremities, finger-nose-finger was intact without dysmetria or overshoot. No intention tremor elicited.    GAIT:   Unable to assess gait as PT/OT have not cleared patient for ambulation yet.    Last Recorded Vitals  Blood pressure 144/87, pulse 59, temperature 36.5 °C (97.7 °F), resp. rate 16, height 1.93 m (6' 3.98\"), weight 92.1 kg (203 lb 0.7 oz), SpO2 96 %.    Relevant Results  CTH non-contrast: moderate dilation of lateral and third ventricles with possibility of adult hydrocephalus; minimal focal hypodensity in L corona radiata and R lateral thalamus likely d/t ischemia of uncertain chronicity     Assessment/Plan   Miguel Angel Santos is a 73 y.o. male with history of HTN and T2M, who presented to [facility] s/p " syncopal event likely 2/2 hypertensive emergency. Neurology was consulted for neurocognitive decline with quick deterioration in the last 2 weeks.    Patient notably with word-finding difficulties and appears to compensate with elaborate statements when unable to answer questions or complete tasks. Poor recent memory with 1/3 words recalled after memory cues, poor attention and concentration. Nonfocal neurological exam. Unable to assess gait at this time due to clearance needed from PT/OT. CTH with possible concern for NPH, and patient already scheduled for outpatient LP. Given recent delirium reported by primary team, it is difficult to ascertain whether this cognitive assessment is his baseline or if it's impacted by delirium. Patient would benefit from outpatient assessment.    Plan:  - Outpatient follow-up appointment with Dr. Boyle in 3-6 months (cognitive and movement disorder specialist)    Melody Loco MD   Adult Psychiatry, PGY-2      ======  Neurology Senior Resident Attestation and Addendum:  I personally saw and examined this patient, obtaining key portions of the history. I have read and edited the above note as above and agree with the assessment and plan. Briefly, this is a 73 year-old male with a history of HTN and T2DM who is admitted to the hospitalist service as a stepdown from CICU for hypertensive emergency. Course further complicated by delirium. Inpatient neurology is consulted to evaluate a year-long cognitive decline. Assessment is remarkable for moderate difficulty with recent & remote memory, attention, and concentration. No tremors on the current exam, which is further unfortunately unfortunately limited by inability to perform gait assessment. There is some bilateral cogwheel rigidity which raises concern for a parkinsonism. However, given the patient is admitted for hypertensive encephalopathy and has jut gotten out of the ICU, the picture favors delirium. Assessment for  neurocognitive disorder thus cannot be made at this time and is deferred to outpatient. Given family has noticed some shuffling gait and there is bilateral upper extremity rigidity on exam, it is appropriate for him to follow with movement disorders & cognitive neurology, with which our colleague Dr. Tram Boyle is an expert. Please arrange 3-6 month followup.    Thank you for this consult. Neurology will sign off. Please do not hesitate to reach out to our team by page or secure chat with any questions you may have. Patient was seen, examined, and discussed with the attending physician, who agrees w/ the assessment and plan.      Alonso Barbour MD PGY-3  [facility] Inpatient Neurology Team  General Neurology Pager [phone]

## 2023-10-05 NOTE — PROGRESS NOTES
"Miguel Angel Santos is a 73 y.o. male on day 3 of admission presenting after being found down and was diagnosed with hypertensive emergency.    Subjective   Lying in bed. No distress.  Patient joking and laughing.  States that he is feeling fine.         Objective     General: Lying in bed without distress.  Cooperative.  Skin: No rashes ulcerations.  HEENT: Sclera is white.  Mucous membranes moist.  Neck: Supple.  No JVD.  Cardiac: Regular rate and rhythm, S1/S2 distant.  Lungs: Clear to auscultation bilaterally, no wheezing or crackles, no accessory muscle use at rest.  Abdomen: Soft, nontender, nondistended, BS +  Extremities: No cyanosis.  No lower extremity edema.  Neurologic: Alert and oriented to self, to place, to time, and to situation today.  No focal deficits.  Significant improvement from yesterday.  Psychiatric: Appropriate mood and behavior.  Currently no agitation.    Last Recorded Vitals  Blood pressure 144/87, pulse 59, temperature 36.5 °C (97.7 °F), resp. rate 16, height 1.93 m (6' 3.98\"), weight 92.1 kg (203 lb 0.7 oz), SpO2 96 %.  On room air.    Intake/Output last 3 Shifts:  I/O last 3 completed shifts:  In: - (0 mL/kg)   Out: 2550 (27.7 mL/kg) [Urine:2550 (0.8 mL/kg/hr)]  Weight: 92.1 kg     Relevant Results  ascorbic acid, 500 mg, oral, Daily  aspirin, 81 mg, oral, Daily  atorvastatin, 40 mg, oral, Daily  carvedilol, 12.5 mg, oral, q12h  [Held by provider] chlorthalidone, 25 mg, oral, Daily  cholecalciferol, 25 mcg, oral, Daily  dapagliflozin propanediol, 10 mg, oral, Daily  enoxaparin, 40 mg, subcutaneous, Daily  insulin glargine, 15 Units, subcutaneous, Nightly  insulin lispro, 0-10 Units, subcutaneous, TID with meals  losartan, 25 mg, oral, Daily  metFORMIN, 1,000 mg, oral, BID  multivitamin with minerals, 1 tablet, oral, Daily  ofloxacin, 1 drop, Both Eyes, 4x daily  perflutren protein A microsphere, 0.5 mL, intravenous, Once in imaging  sulfur hexafluoride microsphr, 2 mL, intravenous, Once in " imaging           PRN medications: dextrose 10 % in water (D10W), dextrose, glucagon, hydrALAZINE     Hospital course:  A 72 y/o M patient with PMHx of HTN, DMII per self-report, who presents for a syncopal event. Hx was difficult to obtain however from ED report it was mentioned that he was found down by his daughter and he was covered in urine, he doesn't recall any traumatic event and described it as syncope. His vitals showed BP in 200s/100s, labs showed elevated trops, imaging showed findings suggestive of NPH. Started on cardene drip and admitted to CICU for further monitoring.  Patient improved, weaned off Cardene drip, then moved to medical floor on 10/3.  After transfer patient was alert and pleasantly confused.  In 24 hours patient improved to alert and fully oriented.    Assessment/Plan     Syncope  -Currently suspected to be secondary to hypertensive emergency.  -Waiting on orthostatic vitals.    -Transthoracic echocardiogram done showing EF of 55 to 60% but suboptimal image quality.  No mention of significant valvular issues.    Hypertensive emergency  -Blood pressure significantly elevated on admission.  -Currently on Coreg 12.5 mg twice a day, chlorthalidone 50 mg daily, dapagliflozin 10 mg daily, losartan 25 mg daily.  -Blood pressure dropped to 90s last evening.  -device interrogation of PPM that was placed in 2018 ([employer] [id number] dual-chamber [place] 8DR-7 with serial #[id number] placed on 9/21/2018 at [facility]) done yesterday per device nurse.    Type II demand ischemia  -Elevated high-sensitivity troponins on admission but this is likely secondary to hypertensive emergency.  Troponins down trended since admission.    Acute kidney injury  -Creatinine yesterday 0.99, has increased to 1.49 today.  Likely secondary to having 2 diuretics on board.  Discontinue chlorthalidone.  -Check BMP in AM.    Diabetes mellitus type 2  -Patient reports no medications at home.  Reportedly on diet control only.   Hemoglobin A1c 9.9.  -Glucose control improved from yesterday but still not ideal.  -Increase Lantus to 20 units at bedtime, continue mild sliding scale insulin.    -Continue Jardiance 10 mg daily, will need to hold metformin due to increasing creatinine.    Altered mental status, delirium versus neurocognitive disorder  -OT OT to still evaluate.  -Patient appears much more oriented today than yesterday.  The sudden improvement makes me think that this is more likely delirium.  Urine tox screen was negative.  Patient only takes vitamin C and vitamin D over-the-counter.  Denies anything else.  -Patient's daughter is reporting poor memory recall with progression of mentation deterioration over the last 2 weeks.  Reportedly patient also has been having shuffling of his feet and reports of tremors of his hands.  Will ask neurology team for opinion.  -MRI of the brain if can be done inpatient.  If cannot be done inpatient quickly may have to reschedule this outpatient.    Generalized weakness/physical deconditioning  -PT has recommended high intensity acute rehab.  Rehab facilities to evaluate.  Waiting for OT evaluation.    DVT prophylaxis  -Subcu Lovenox 40 mg daily.      Lawanda Puente MD

## 2023-10-05 NOTE — CARE PLAN
The patient's goals for the shift include      The clinical goals for the shift include free from falls      Problem: Fall/Injury  Goal: Verbalize understanding of personal risk factors for fall in the hospital  Outcome: Progressing  Goal: Verbalize understanding of risk factor reduction measures to prevent injury from fall in the home  Outcome: Progressing  Goal: Use assistive devices by end of the shift  Outcome: Progressing  Goal: Pace activities to prevent fatigue by end of the shift  Outcome: Progressing     Problem: Psychosocial Needs  Goal: Demonstrates ability to cope with hospitalization/illness  Outcome: Progressing  Goal: Collaborate with me, my family, and caregiver to identify my specific goals  Outcome: Progressing     Problem: Skin  Goal: Decreased wound size/increased tissue granulation at next dressing change  Outcome: Progressing  Goal: Participates in plan/prevention/treatment measures  Outcome: Progressing  Goal: Prevent/manage excess moisture  Outcome: Progressing  Goal: Prevent/minimize sheer/friction injuries  Outcome: Progressing  Goal: Promote/optimize nutrition  Outcome: Progressing  Goal: Promote skin healing  Outcome: Progressing

## 2023-10-06 PROBLEM — I16.1 HYPERTENSIVE EMERGENCY: Status: RESOLVED | Noted: 2023-10-02 | Resolved: 2023-10-06

## 2023-10-06 PROBLEM — I21.A1 TYPE 2 MI (MYOCARDIAL INFARCTION) (MULTI): Status: RESOLVED | Noted: 2023-10-02 | Resolved: 2023-10-06

## 2023-10-06 LAB
ANION GAP SERPL CALC-SCNC: 14 MMOL/L (ref 10–20)
BUN SERPL-MCNC: 37 MG/DL (ref 6–23)
CALCIUM SERPL-MCNC: 9.3 MG/DL (ref 8.6–10.6)
CHLORIDE SERPL-SCNC: 98 MMOL/L (ref 98–107)
CO2 SERPL-SCNC: 27 MMOL/L (ref 21–32)
CREAT SERPL-MCNC: 1.14 MG/DL (ref 0.5–1.3)
GFR SERPL CREATININE-BSD FRML MDRD: 68 ML/MIN/1.73M*2
GLUCOSE BLD MANUAL STRIP-MCNC: 219 MG/DL (ref 74–99)
GLUCOSE BLD MANUAL STRIP-MCNC: 270 MG/DL (ref 74–99)
GLUCOSE BLD MANUAL STRIP-MCNC: 378 MG/DL (ref 74–99)
GLUCOSE SERPL-MCNC: 215 MG/DL (ref 74–99)
POTASSIUM SERPL-SCNC: 3.1 MMOL/L (ref 3.5–5.3)
SODIUM SERPL-SCNC: 136 MMOL/L (ref 136–145)

## 2023-10-06 PROCEDURE — 96372 THER/PROPH/DIAG INJ SC/IM: CPT | Performed by: INTERNAL MEDICINE

## 2023-10-06 PROCEDURE — 82947 ASSAY GLUCOSE BLOOD QUANT: CPT

## 2023-10-06 PROCEDURE — 36415 COLL VENOUS BLD VENIPUNCTURE: CPT | Performed by: INTERNAL MEDICINE

## 2023-10-06 PROCEDURE — 1100000001 HC PRIVATE ROOM DAILY

## 2023-10-06 PROCEDURE — 99233 SBSQ HOSP IP/OBS HIGH 50: CPT | Performed by: INTERNAL MEDICINE

## 2023-10-06 PROCEDURE — 80048 BASIC METABOLIC PNL TOTAL CA: CPT | Performed by: INTERNAL MEDICINE

## 2023-10-06 PROCEDURE — 96372 THER/PROPH/DIAG INJ SC/IM: CPT

## 2023-10-06 PROCEDURE — 2500000004 HC RX 250 GENERAL PHARMACY W/ HCPCS (ALT 636 FOR OP/ED): Performed by: INTERNAL MEDICINE

## 2023-10-06 PROCEDURE — 2500000001 HC RX 250 WO HCPCS SELF ADMINISTERED DRUGS (ALT 637 FOR MEDICARE OP)

## 2023-10-06 PROCEDURE — 2500000001 HC RX 250 WO HCPCS SELF ADMINISTERED DRUGS (ALT 637 FOR MEDICARE OP): Performed by: INTERNAL MEDICINE

## 2023-10-06 PROCEDURE — 2500000004 HC RX 250 GENERAL PHARMACY W/ HCPCS (ALT 636 FOR OP/ED): Performed by: STUDENT IN AN ORGANIZED HEALTH CARE EDUCATION/TRAINING PROGRAM

## 2023-10-06 PROCEDURE — 97530 THERAPEUTIC ACTIVITIES: CPT | Mod: GP,CQ

## 2023-10-06 PROCEDURE — 2500000002 HC RX 250 W HCPCS SELF ADMINISTERED DRUGS (ALT 637 FOR MEDICARE OP, ALT 636 FOR OP/ED): Performed by: INTERNAL MEDICINE

## 2023-10-06 PROCEDURE — 2500000002 HC RX 250 W HCPCS SELF ADMINISTERED DRUGS (ALT 637 FOR MEDICARE OP, ALT 636 FOR OP/ED)

## 2023-10-06 PROCEDURE — 2500000001 HC RX 250 WO HCPCS SELF ADMINISTERED DRUGS (ALT 637 FOR MEDICARE OP): Performed by: STUDENT IN AN ORGANIZED HEALTH CARE EDUCATION/TRAINING PROGRAM

## 2023-10-06 RX ORDER — HYDRALAZINE HYDROCHLORIDE 50 MG/1
50 TABLET, FILM COATED ORAL 3 TIMES DAILY
Status: DISCONTINUED | OUTPATIENT
Start: 2023-10-06 | End: 2023-10-07

## 2023-10-06 RX ORDER — POTASSIUM CHLORIDE 20 MEQ/1
40 TABLET, EXTENDED RELEASE ORAL DAILY
Status: COMPLETED | OUTPATIENT
Start: 2023-10-06 | End: 2023-10-06

## 2023-10-06 RX ORDER — LOSARTAN POTASSIUM 50 MG/1
50 TABLET ORAL DAILY
Status: DISCONTINUED | OUTPATIENT
Start: 2023-10-06 | End: 2023-10-10

## 2023-10-06 RX ORDER — ASCORBIC ACID 500 MG
500 TABLET ORAL DAILY
Status: CANCELLED | OUTPATIENT
Start: 2023-10-06

## 2023-10-06 RX ADMIN — OFLOXACIN 1 DROP: 3 SOLUTION OPHTHALMIC at 22:49

## 2023-10-06 RX ADMIN — OXYCODONE HYDROCHLORIDE AND ACETAMINOPHEN 500 MG: 500 TABLET ORAL at 12:45

## 2023-10-06 RX ADMIN — OFLOXACIN 1 DROP: 3 SOLUTION OPHTHALMIC at 12:49

## 2023-10-06 RX ADMIN — CARVEDILOL 12.5 MG: 12.5 TABLET, FILM COATED ORAL at 07:00

## 2023-10-06 RX ADMIN — ASPIRIN 81 MG: 81 TABLET, COATED ORAL at 12:46

## 2023-10-06 RX ADMIN — INSULIN GLARGINE 20 UNITS: 100 INJECTION, SOLUTION SUBCUTANEOUS at 22:45

## 2023-10-06 RX ADMIN — CARVEDILOL 12.5 MG: 12.5 TABLET, FILM COATED ORAL at 17:12

## 2023-10-06 RX ADMIN — HYDRALAZINE HYDROCHLORIDE 10 MG: 20 INJECTION INTRAMUSCULAR; INTRAVENOUS at 07:00

## 2023-10-06 RX ADMIN — Medication 25 MCG: at 12:46

## 2023-10-06 RX ADMIN — POTASSIUM CHLORIDE 40 MEQ: 1500 TABLET, EXTENDED RELEASE ORAL at 17:12

## 2023-10-06 RX ADMIN — INSULIN LISPRO 6 UNITS: 100 INJECTION, SOLUTION INTRAVENOUS; SUBCUTANEOUS at 12:53

## 2023-10-06 RX ADMIN — OFLOXACIN 1 DROP: 3 SOLUTION OPHTHALMIC at 17:13

## 2023-10-06 RX ADMIN — INSULIN LISPRO 10 UNITS: 100 INJECTION, SOLUTION INTRAVENOUS; SUBCUTANEOUS at 18:27

## 2023-10-06 RX ADMIN — Medication 1 TABLET: at 12:45

## 2023-10-06 RX ADMIN — ENOXAPARIN SODIUM 40 MG: 40 INJECTION SUBCUTANEOUS at 12:52

## 2023-10-06 RX ADMIN — LOSARTAN POTASSIUM 50 MG: 50 TABLET, FILM COATED ORAL at 12:45

## 2023-10-06 RX ADMIN — HYDRALAZINE HYDROCHLORIDE 50 MG: 50 TABLET, FILM COATED ORAL at 17:12

## 2023-10-06 RX ADMIN — DAPAGLIFLOZIN 10 MG: 10 TABLET, FILM COATED ORAL at 12:53

## 2023-10-06 RX ADMIN — HYDRALAZINE HYDROCHLORIDE 50 MG: 50 TABLET, FILM COATED ORAL at 22:45

## 2023-10-06 RX ADMIN — ATORVASTATIN CALCIUM 40 MG: 40 TABLET, FILM COATED ORAL at 12:45

## 2023-10-06 ASSESSMENT — COGNITIVE AND FUNCTIONAL STATUS - GENERAL
EATING MEALS: A LITTLE
DRESSING REGULAR LOWER BODY CLOTHING: A LOT
STANDING UP FROM CHAIR USING ARMS: A LITTLE
MOBILITY SCORE: 13
MOVING TO AND FROM BED TO CHAIR: A LITTLE
PERSONAL GROOMING: A LOT
DAILY ACTIVITIY SCORE: 14
CLIMB 3 TO 5 STEPS WITH RAILING: TOTAL
TURNING FROM BACK TO SIDE WHILE IN FLAT BAD: A LITTLE
MOVING TO AND FROM BED TO CHAIR: A LOT
EATING MEALS: A LITTLE
TOILETING: A LOT
MOVING TO AND FROM BED TO CHAIR: A LOT
MOVING FROM LYING ON BACK TO SITTING ON SIDE OF FLAT BED WITH BEDRAILS: A LITTLE
STANDING UP FROM CHAIR USING ARMS: A LOT
TURNING FROM BACK TO SIDE WHILE IN FLAT BAD: A LOT
DAILY ACTIVITIY SCORE: 13
DRESSING REGULAR LOWER BODY CLOTHING: A LOT
CLIMB 3 TO 5 STEPS WITH RAILING: TOTAL
STANDING UP FROM CHAIR USING ARMS: A LOT
CLIMB 3 TO 5 STEPS WITH RAILING: TOTAL
MOVING FROM LYING ON BACK TO SITTING ON SIDE OF FLAT BED WITH BEDRAILS: A LITTLE
WALKING IN HOSPITAL ROOM: A LOT
DRESSING REGULAR UPPER BODY CLOTHING: A LOT
WALKING IN HOSPITAL ROOM: A LITTLE
HELP NEEDED FOR BATHING: A LOT
MOBILITY SCORE: 16
TOILETING: A LOT
HELP NEEDED FOR BATHING: TOTAL
DRESSING REGULAR UPPER BODY CLOTHING: A LOT
TURNING FROM BACK TO SIDE WHILE IN FLAT BAD: A LITTLE
MOBILITY SCORE: 12
WALKING IN HOSPITAL ROOM: A LOT
MOVING FROM LYING ON BACK TO SITTING ON SIDE OF FLAT BED WITH BEDRAILS: A LITTLE

## 2023-10-06 ASSESSMENT — PAIN - FUNCTIONAL ASSESSMENT: PAIN_FUNCTIONAL_ASSESSMENT: 0-10

## 2023-10-06 ASSESSMENT — PAIN SCALES - GENERAL: PAINLEVEL_OUTOF10: 0 - NO PAIN

## 2023-10-06 NOTE — PROGRESS NOTES
"Miguel Angel Santos is a 73 y.o. male on day 4 of admission presenting after being found down and was diagnosed with hypertensive emergency.    Subjective   Lying in bed. No distress.  Patient joking and laughing.  States that he is feeling fine.  No further complaints today.         Objective     General: Lying in bed without distress.  Cooperative.  Skin: No rashes ulcerations.  HEENT: Sclera is white.  Mucous membranes moist.  Neck: Supple.  No JVD.  Cardiac: Regular rate and rhythm, S1/S2 distant.  Lungs: Clear to auscultation bilaterally, no wheezing or crackles, no accessory muscle use at rest.  Abdomen: Soft, nontender, nondistended, BS +  Extremities: No cyanosis.  No lower extremity edema.  Neurologic: Alert and oriented to self, to place, to time, and to situation today.  No focal deficits.  Significant improvement from yesterday.  Psychiatric: Appropriate mood and behavior.  Currently no agitation.    Last Recorded Vitals  Blood pressure (!) 185/93, pulse 67, temperature 36.6 °C (97.9 °F), resp. rate 19, height 1.93 m (6' 3.98\"), weight 92.1 kg (203 lb 0.7 oz), SpO2 97 %.  On room air.    Intake/Output last 3 Shifts:  I/O last 3 completed shifts:  In: - (0 mL/kg)   Out: 2075 (22.5 mL/kg) [Urine:2075 (0.6 mL/kg/hr)]  Weight: 92.1 kg     Relevant Results  ascorbic acid, 500 mg, oral, Daily  aspirin, 81 mg, oral, Daily  atorvastatin, 40 mg, oral, Daily  carvedilol, 12.5 mg, oral, q12h  cholecalciferol, 25 mcg, oral, Daily  dapagliflozin propanediol, 10 mg, oral, Daily  enoxaparin, 40 mg, subcutaneous, Daily  insulin glargine, 20 Units, subcutaneous, Nightly  insulin lispro, 0-10 Units, subcutaneous, TID with meals  losartan, 50 mg, oral, Daily  [Held by provider] metFORMIN, 1,000 mg, oral, BID  multivitamin with minerals, 1 tablet, oral, Daily  ofloxacin, 1 drop, Both Eyes, 4x daily  perflutren protein A microsphere, 0.5 mL, intravenous, Once in imaging  sulfur hexafluoride microsphr, 2 mL, intravenous, Once in " imaging           PRN medications: dextrose 10 % in water (D10W), dextrose, glucagon, hydrALAZINE     Hospital course:  A 74 y/o M patient with PMHx of HTN, DMII per self-report, who presents for a syncopal event. Hx was difficult to obtain however from ED report it was mentioned that he was found down by his daughter and he was covered in urine, he doesn't recall any traumatic event and described it as syncope. His vitals showed BP in 200s/100s, labs showed elevated trops, imaging showed findings suggestive of NPH. Started on cardene drip and admitted to CICU for further monitoring.  Patient improved, weaned off Cardene drip, then moved to medical floor on 10/3.  After transfer patient was alert and pleasantly confused.  In 24 hours patient improved to alert and fully oriented.  Neurology consulted due to family report of shuffling gait with intermittent tremors and reports of progressive mental deterioration over short period of 2 weeks.  Neurology saw the patient and no specific diagnosis, recommended patient follow-up outpatient with neurocognitive specialist.    Assessment/Plan     Syncope  -Currently suspected to be secondary to hypertensive emergency.  -Orthostatic vitals done, patient appears to have a significant drop in blood pressure from lying to sitting, but then recovered upon standing.  Did not have any symptoms.  -Transthoracic echocardiogram done showing EF of 55 to 60% but suboptimal image quality.  No mention of significant valvular issues.    Hypertensive emergency  -Blood pressure significantly elevated on admission.  Patient reports that he has been taking his medication, but at this time unknown if he was truly taking his medications accurately.  Family reports that they are not sure if he truly has been taking it correctly.  -Currently on Coreg 12.5 mg twice a day, dapagliflozin 10 mg daily, losartan 25 mg daily.  -Blood pressure yesterday ranged in systolic 180s.  -Increase losartan to 50 mg  daily.  -Continue IV hydralazine as needed for elevated blood pressure.  -device interrogation of PPM that was placed in 2018 (Biotronik [id number] dual-chamber [place] 8DR-7 with serial #[id number] placed on 9/21/2018 at [facility]) done yesterday per device nurse.  Report reviewed and no significant arrhythmias.    Type II demand ischemia  -Elevated high-sensitivity troponins on admission but this is likely secondary to hypertensive emergency.  Troponins down trended since admission.    Acute kidney injury  -Creatinine previously 0.99, had increased to 1.49 today.  Suspect secondary to multiple diuretics while also taking losartan.  Unknown how much patient is drinking.  -BMP today drawn, results pending.    Diabetes mellitus type 2  -Patient reports no medications at home.  Reportedly on diet control only, but previously patient appears to have been prescribed medications.  Hemoglobin A1c 9.9.  -Glucose control improved from admission but still not ideal.  -Continue Lantus to 20 units at bedtime, continue mild sliding scale insulin.    -Continue Jardiance 10 mg daily, will need to hold metformin due to increasing creatinine.  Will monitor today to see if creatinine improves to see if metformin can be restarted.    Altered mental status, delirium versus neurocognitive disorder  -PT/OT has recommended skilled nursing facility, patient is in agreement and family is in agreement.  -Cognitively patient appears to be answering questions much better and aware of the situation much better.  The fluctuating improvement since indicative of some form of delirium, although etiology is not clear but right now suspecting secondary to hypertension and poorly controlled diabetes outpatient  -Patient's daughter is reporting poor memory recall with progression of mentation deterioration over the last 2 weeks.  Reportedly patient also has been having shuffling of his feet and reports of tremors of his hands.  Neurology has seen the patient and  recommends outpatient follow-up with neurocognitive specialist.  -MRI of the brain if can be done inpatient.  If cannot be done inpatient quickly may have to reschedule this outpatient.    Generalized weakness/physical deconditioning  -Currently waiting on accepting facility.    DVT prophylaxis  -Subcu Lovenox 40 mg daily.      Lawanda Puente MD

## 2023-10-06 NOTE — PROGRESS NOTES
Physical Therapy    Physical Therapy Treatment    Patient Name: Miguel Angel Santos  MRN: [id number]  Today's Date: 10/6/2023  Time Calculation  Start Time: 1217  Stop Time: 1245  Time Calculation (min): 28 min       Assessment/Plan   PT Assessment  PT Assessment Results: Decreased strength, Decreased range of motion, Impaired balance, Decreased mobility, Decreased coordination, Decreased cognition  Rehab Prognosis: Good  Evaluation/Treatment Tolerance: Patient tolerated treatment well  Medical Staff Made Aware: Yes  End of Session Communication: Bedside nurse  End of Session Patient Position: Bed, 2 rail up, Alarm off, not on at start of session  PT Plan  Inpatient/Swing Bed or Outpatient: Inpatient  PT Plan  Treatment/Interventions: Bed mobility, Transfer training, Gait training, Balance training, Neuromuscular re-education, Strengthening, Endurance training, Postural re-education, Therapeutic exercise, Therapeutic activity  PT Plan: Skilled PT  PT Frequency: 4 times per week  PT Discharge Recommendations: High intensity level of continued care  PT Recommended Transfer Status: Assist x2      General Visit Information:   PT  Visit  PT Received On: 10/06/23  General  Family/Caregiver Present: Yes  Caregiver Feedback: Daughter present, supportive and involved in pt's care  Prior to Session Communication: Bedside nurse  Patient Position Received: Bed, 2 rail up    Subjective   Precautions:  Precautions  Medical Precautions: Cardiac precautions  Vital Signs:  Vital Signs  Heart Rate: 72  Heart Rate Source: Monitor  BP: (!) 173/92 (160/113 in sitting, RN notified and requested to end session and return later for second attempt.)  BP Location: Left arm  BP Method: Automatic  Patient Position: Lying    Objective   Pain:     Cognition:  Cognition  Overall Cognitive Status: Impaired  Arousal/Alertness: Appropriate responses to stimuli  Orientation Level: Disoriented to place, Disoriented to situation  Following Commands: Follows  one step commands with repetition  Attention: Exceptions to WFL  Alternating Attention: Impaired  Other (Comment):  (Pt requires constant redirection to the task)  Processing Speed: Delayed  Postural Control:  Postural Control  Trunk Control: retropulsion with R lean  Posture Comment: Pt demonstrates R lean and requires constant cuing to use proper hand placement to adjust trunk    Activity Tolerance:  Activity Tolerance  Endurance: Tolerates less than 10 min exercise with changes in vital signs  Treatments:  Therapeutic Activity  Therapeutic Activity Performed: Yes  Therapeutic Activity 1: log roll  Therapeutic Activity 2: Stand pivot transfer from commode to chair with Mod A with cuing for proper hand placement.    Balance/Neuromuscular Re-Education  Balance/Neuromuscular Re-Education Activity Performed: Yes  Balance/Neuromuscular Re-Education Activity 1: 10 min sitting EOB with cuing for proper hand placement and pelvis positioning to prevent retropulsion and R trunk lean    Bed Mobility  Bed Mobility: Yes  Bed Mobility 1  Bed Mobility 1: Supine to sitting  Level of Assistance 1: Minimum assistance  Bed Mobility Comments 1: Constant cuing to redirect attention to the task  Bed Mobility 2  Bed Mobility  2: Sitting to supine  Level of Assistance 2: Moderate assistance  Bed Mobility Comments 2: constant cuing for proper sequencing  Bed Mobility 3  Bed Mobility 3: Scooting  Level of Assistance 3: Minimum assistance  Bed Mobility Comments 3: Constant cuing for hip management to move to EOB         Outcome Measures:  [facility] Basic Mobility  Turning from your back to your side while in a flat bed without using bedrails: A little  Moving from lying on your back to sitting on the side of a flat bed without using bedrails: A little  Moving to and from bed to chair (including a wheelchair): A lot  Standing up from a chair using your arms (e.g. wheelchair or bedside chair): A lot  To walk in hospital room: A lot  Climbing 3-5  steps with railing: Total  Basic Mobility - Total Score: 13    Education Documentation  Mobility Training, taught by Zaina Mya PTA at 10/6/2023  3:45 PM.  Learner: Family, Patient  Readiness: Acceptance  Method: Explanation  Response: Verbalizes Understanding    Education Comments  No comments found.        OP EDUCATION:       Encounter Problems       Encounter Problems (Active)       Balance       STG - Maintains dynamic standing balance with CGA upper extremity support (Not Progressing)       Start:  10/03/23    Expected End:  10/17/23               Mobility       STG - Patient will ambulate >50 ft with CGA and no assistive device  (Not Progressing)       Start:  10/03/23    Expected End:  10/17/23               Transfers       STG - Patient will perform bed mobility with the use of bedrails and Jami x1 (Not Progressing)       Start:  10/03/23    Expected End:  10/17/23            STG - Patient will transfer sit to and from stand with CGA with no assistive device (Not Progressing)       Start:  10/03/23    Expected End:  10/17/23

## 2023-10-06 NOTE — CARE PLAN
Pt no able to progress this treatment due to RN deferring pt to stand due to elevated BP.   Problem: Balance  Goal: STG - Maintains dynamic standing balance with CGA upper extremity support  10/6/2023 1543 by Zaina May, PTA  Outcome: Not Progressing  10/6/2023 1541 by Zaina May, PTA  Outcome: Progressing     Problem: Mobility  Goal: STG - Patient will ambulate >50 ft with CGA and no assistive device   10/6/2023 1543 by Zaina May, PTA  Outcome: Not Progressing  10/6/2023 1541 by Zaina May, PTA  Outcome: Not Progressing     Problem: Transfers  Goal: STG - Patient will perform bed mobility with the use of bedrails and Jami x1  10/6/2023 1543 by Zaina May, PTA  Outcome: Not Progressing  10/6/2023 1541 by Zaina May, PTA  Outcome: Progressing  Goal: STG - Patient will transfer sit to and from stand with CGA with no assistive device  10/6/2023 1543 by Zaina May, PTA  Outcome: Not Progressing  10/6/2023 1541 by Zaina May, PTA  Note: Unable to assess progression due to deferred standing due to pt's elevated BP.

## 2023-10-06 NOTE — PROGRESS NOTES
Physical Therapy                 Therapy Communication Note    Patient Name: Miguel Angel Santos  MRN: [id number]  Today's Date: 10/6/2023     Discipline: Physical Therapy    Missed Visit Reason: Missed Visit Reason: Patient in a medical procedure (Pt's /86, RN notified and asked therapy to hold treatment)    Missed Time: Attempt    Comment: Entered room for second attempt at therapy for the day, Pt's BP still elevated, RN requested to hold treatment.

## 2023-10-06 NOTE — CARE PLAN
Problem: Fall/Injury  Goal: Verbalize understanding of personal risk factors for fall in the hospital  Outcome: Progressing  Goal: Verbalize understanding of risk factor reduction measures to prevent injury from fall in the home  Outcome: Progressing  Goal: Use assistive devices by end of the shift  Outcome: Progressing  Goal: Pace activities to prevent fatigue by end of the shift  Outcome: Progressing     Problem: Psychosocial Needs  Goal: Demonstrates ability to cope with hospitalization/illness  Outcome: Progressing  Goal: Collaborate with me, my family, and caregiver to identify my specific goals  Outcome: Progressing     Problem: Skin  Goal: Decreased wound size/increased tissue granulation at next dressing change  Outcome: Progressing  Goal: Participates in plan/prevention/treatment measures  Outcome: Progressing  Goal: Prevent/manage excess moisture  Outcome: Progressing  Goal: Prevent/minimize sheer/friction injuries  Outcome: Progressing  Goal: Promote/optimize nutrition  Outcome: Progressing  Goal: Promote skin healing  Outcome: Progressing     Problem: Diabetes  Goal: Achieve decreasing blood glucose levels by end of shift  Outcome: Progressing  Goal: Increase stability of blood glucose readings by end of shift  Outcome: Progressing  Goal: Decrease in ketones present in urine by end of shift  Outcome: Progressing  Goal: Maintain electrolyte levels within acceptable range throughout shift  Outcome: Progressing  Goal: Maintain glucose levels >70mg/dl to <250mg/dl throughout shift  Outcome: Progressing  Goal: No changes in neurological exam by end of shift  Outcome: Progressing  Goal: Learn about and adhere to nutrition recommendations by end of shift  Outcome: Progressing  Goal: Vital signs within normal range for age by end of shift  Outcome: Progressing  Goal: Increase self care and/or family involovement by end of shift  Outcome: Progressing  Goal: Receive DSME education by end of shift  Outcome:  Progressing     Problem: Discharge Planning  Goal: Discharge to home or other facility with appropriate resources  Outcome: Progressing   The patient's goals for the shift include Patient will remain safe and free from falls or injury    The clinical goals for the shift include Patient will have improvement in clinical function

## 2023-10-06 NOTE — PROGRESS NOTES
PT recommends high and OT recommends moderate intensity, referrals were sent to rehabs but they are reluctant to accept as pt does not have good supports at home (home alone, dtrs all work). [facility] is interested.  Obtained SNF preferences from dtr this afternoon, several referrals were sent.  ADOD 10/7.  TCC will follow on acceptances.    Roxie Henriquez MSN, RN-BC  Transitional Care Coordinator (TCC)  [phone]

## 2023-10-06 NOTE — CARE PLAN
The patient's goals for the shift include      The clinical goals for the shift include free from falls    Over the shift, the patient did not make progress toward the following goals. Barriers to progression include ***. Recommendations to address these barriers include ***.

## 2023-10-07 LAB
ANION GAP SERPL CALC-SCNC: 16 MMOL/L (ref 10–20)
BUN SERPL-MCNC: 36 MG/DL (ref 6–23)
CALCIUM SERPL-MCNC: 9.7 MG/DL (ref 8.6–10.6)
CHLORIDE SERPL-SCNC: 98 MMOL/L (ref 98–107)
CO2 SERPL-SCNC: 27 MMOL/L (ref 21–32)
CREAT SERPL-MCNC: 1.19 MG/DL (ref 0.5–1.3)
GFR SERPL CREATININE-BSD FRML MDRD: 64 ML/MIN/1.73M*2
GLUCOSE BLD MANUAL STRIP-MCNC: 191 MG/DL (ref 74–99)
GLUCOSE BLD MANUAL STRIP-MCNC: 224 MG/DL (ref 74–99)
GLUCOSE BLD MANUAL STRIP-MCNC: 246 MG/DL (ref 74–99)
GLUCOSE BLD MANUAL STRIP-MCNC: 297 MG/DL (ref 74–99)
GLUCOSE BLD MANUAL STRIP-MCNC: 320 MG/DL (ref 74–99)
GLUCOSE SERPL-MCNC: 243 MG/DL (ref 74–99)
POTASSIUM SERPL-SCNC: 3.4 MMOL/L (ref 3.5–5.3)
SODIUM SERPL-SCNC: 138 MMOL/L (ref 136–145)

## 2023-10-07 PROCEDURE — 36415 COLL VENOUS BLD VENIPUNCTURE: CPT | Performed by: INTERNAL MEDICINE

## 2023-10-07 PROCEDURE — 2500000001 HC RX 250 WO HCPCS SELF ADMINISTERED DRUGS (ALT 637 FOR MEDICARE OP): Performed by: INTERNAL MEDICINE

## 2023-10-07 PROCEDURE — 2500000002 HC RX 250 W HCPCS SELF ADMINISTERED DRUGS (ALT 637 FOR MEDICARE OP, ALT 636 FOR OP/ED): Performed by: INTERNAL MEDICINE

## 2023-10-07 PROCEDURE — 2500000004 HC RX 250 GENERAL PHARMACY W/ HCPCS (ALT 636 FOR OP/ED): Performed by: STUDENT IN AN ORGANIZED HEALTH CARE EDUCATION/TRAINING PROGRAM

## 2023-10-07 PROCEDURE — 82310 ASSAY OF CALCIUM: CPT | Performed by: INTERNAL MEDICINE

## 2023-10-07 PROCEDURE — 96372 THER/PROPH/DIAG INJ SC/IM: CPT | Performed by: INTERNAL MEDICINE

## 2023-10-07 PROCEDURE — 2500000001 HC RX 250 WO HCPCS SELF ADMINISTERED DRUGS (ALT 637 FOR MEDICARE OP): Performed by: STUDENT IN AN ORGANIZED HEALTH CARE EDUCATION/TRAINING PROGRAM

## 2023-10-07 PROCEDURE — 2500000002 HC RX 250 W HCPCS SELF ADMINISTERED DRUGS (ALT 637 FOR MEDICARE OP, ALT 636 FOR OP/ED)

## 2023-10-07 PROCEDURE — 96372 THER/PROPH/DIAG INJ SC/IM: CPT

## 2023-10-07 PROCEDURE — 82947 ASSAY GLUCOSE BLOOD QUANT: CPT

## 2023-10-07 PROCEDURE — 99233 SBSQ HOSP IP/OBS HIGH 50: CPT | Performed by: INTERNAL MEDICINE

## 2023-10-07 PROCEDURE — 1100000001 HC PRIVATE ROOM DAILY

## 2023-10-07 PROCEDURE — 2500000004 HC RX 250 GENERAL PHARMACY W/ HCPCS (ALT 636 FOR OP/ED): Performed by: INTERNAL MEDICINE

## 2023-10-07 PROCEDURE — 2500000001 HC RX 250 WO HCPCS SELF ADMINISTERED DRUGS (ALT 637 FOR MEDICARE OP)

## 2023-10-07 RX ORDER — INSULIN LISPRO 100 [IU]/ML
0-10 INJECTION, SOLUTION INTRAVENOUS; SUBCUTANEOUS
Start: 2023-10-08

## 2023-10-07 RX ORDER — CARVEDILOL 12.5 MG/1
12.5 TABLET ORAL EVERY 12 HOURS
Start: 2023-10-08

## 2023-10-07 RX ORDER — CHOLECALCIFEROL (VITAMIN D3) 25 MCG
25 TABLET ORAL DAILY
Start: 2023-10-08

## 2023-10-07 RX ORDER — HYDRALAZINE HYDROCHLORIDE 50 MG/1
TABLET, FILM COATED ORAL
Status: DISPENSED
Start: 2023-10-07 | End: 2023-10-08

## 2023-10-07 RX ORDER — MULTIVIT-MIN/IRON FUM/FOLIC AC 7.5 MG-4
1 TABLET ORAL DAILY
Start: 2023-10-08

## 2023-10-07 RX ADMIN — OFLOXACIN 1 DROP: 3 SOLUTION OPHTHALMIC at 08:19

## 2023-10-07 RX ADMIN — DAPAGLIFLOZIN 10 MG: 10 TABLET, FILM COATED ORAL at 08:19

## 2023-10-07 RX ADMIN — CARVEDILOL 12.5 MG: 12.5 TABLET, FILM COATED ORAL at 06:20

## 2023-10-07 RX ADMIN — OFLOXACIN 1 DROP: 3 SOLUTION OPHTHALMIC at 13:46

## 2023-10-07 RX ADMIN — CARVEDILOL 12.5 MG: 12.5 TABLET, FILM COATED ORAL at 17:05

## 2023-10-07 RX ADMIN — SITAGLIPTIN 50 MG: 50 TABLET, FILM COATED ORAL at 11:16

## 2023-10-07 RX ADMIN — HYDRALAZINE HYDROCHLORIDE 75 MG: 50 TABLET, FILM COATED ORAL at 21:53

## 2023-10-07 RX ADMIN — METFORMIN HYDROCHLORIDE 1000 MG: 1000 TABLET, FILM COATED ORAL at 11:16

## 2023-10-07 RX ADMIN — INSULIN LISPRO 6 UNITS: 100 INJECTION, SOLUTION INTRAVENOUS; SUBCUTANEOUS at 13:46

## 2023-10-07 RX ADMIN — ATORVASTATIN CALCIUM 40 MG: 40 TABLET, FILM COATED ORAL at 08:19

## 2023-10-07 RX ADMIN — HYDRALAZINE HYDROCHLORIDE 50 MG: 50 TABLET, FILM COATED ORAL at 08:19

## 2023-10-07 RX ADMIN — Medication 25 MCG: at 08:18

## 2023-10-07 RX ADMIN — HYDRALAZINE HYDROCHLORIDE 10 MG: 20 INJECTION INTRAMUSCULAR; INTRAVENOUS at 11:19

## 2023-10-07 RX ADMIN — INSULIN GLARGINE 20 UNITS: 100 INJECTION, SOLUTION SUBCUTANEOUS at 21:54

## 2023-10-07 RX ADMIN — ASPIRIN 81 MG: 81 TABLET, COATED ORAL at 08:19

## 2023-10-07 RX ADMIN — INSULIN LISPRO 4 UNITS: 100 INJECTION, SOLUTION INTRAVENOUS; SUBCUTANEOUS at 08:20

## 2023-10-07 RX ADMIN — OFLOXACIN 1 DROP: 3 SOLUTION OPHTHALMIC at 17:06

## 2023-10-07 RX ADMIN — OFLOXACIN 1 DROP: 3 SOLUTION OPHTHALMIC at 21:54

## 2023-10-07 RX ADMIN — LOSARTAN POTASSIUM 50 MG: 50 TABLET, FILM COATED ORAL at 08:18

## 2023-10-07 RX ADMIN — METFORMIN HYDROCHLORIDE 1000 MG: 1000 TABLET, FILM COATED ORAL at 21:53

## 2023-10-07 RX ADMIN — ENOXAPARIN SODIUM 40 MG: 40 INJECTION SUBCUTANEOUS at 08:18

## 2023-10-07 RX ADMIN — INSULIN LISPRO 2 UNITS: 100 INJECTION, SOLUTION INTRAVENOUS; SUBCUTANEOUS at 17:05

## 2023-10-07 RX ADMIN — Medication 1 TABLET: at 08:18

## 2023-10-07 RX ADMIN — OXYCODONE HYDROCHLORIDE AND ACETAMINOPHEN 500 MG: 500 TABLET ORAL at 08:18

## 2023-10-07 ASSESSMENT — COGNITIVE AND FUNCTIONAL STATUS - GENERAL
TOILETING: A LITTLE
TURNING FROM BACK TO SIDE WHILE IN FLAT BAD: A LITTLE
DAILY ACTIVITIY SCORE: 18
MOVING TO AND FROM BED TO CHAIR: A LOT
WALKING IN HOSPITAL ROOM: A LITTLE
CLIMB 3 TO 5 STEPS WITH RAILING: A LOT
HELP NEEDED FOR BATHING: A LITTLE
MOVING FROM LYING ON BACK TO SITTING ON SIDE OF FLAT BED WITH BEDRAILS: A LITTLE
MOVING FROM LYING ON BACK TO SITTING ON SIDE OF FLAT BED WITH BEDRAILS: A LITTLE
WALKING IN HOSPITAL ROOM: A LOT
MOBILITY SCORE: 17
STANDING UP FROM CHAIR USING ARMS: A LOT
DRESSING REGULAR UPPER BODY CLOTHING: A LITTLE
TOILETING: A LITTLE
STANDING UP FROM CHAIR USING ARMS: A LITTLE
DAILY ACTIVITIY SCORE: 20
CLIMB 3 TO 5 STEPS WITH RAILING: A LOT
HELP NEEDED FOR BATHING: A LITTLE
EATING MEALS: A LITTLE
MOVING TO AND FROM BED TO CHAIR: A LITTLE
TURNING FROM BACK TO SIDE WHILE IN FLAT BAD: A LITTLE
DRESSING REGULAR UPPER BODY CLOTHING: A LITTLE
PERSONAL GROOMING: A LITTLE
MOBILITY SCORE: 14
DRESSING REGULAR LOWER BODY CLOTHING: A LITTLE
PERSONAL GROOMING: A LITTLE

## 2023-10-07 ASSESSMENT — PAIN - FUNCTIONAL ASSESSMENT
PAIN_FUNCTIONAL_ASSESSMENT: 0-10

## 2023-10-07 ASSESSMENT — PAIN SCALES - GENERAL
PAINLEVEL_OUTOF10: 0 - NO PAIN

## 2023-10-07 NOTE — CARE PLAN
Problem: Diabetes  Goal: Increase stability of blood glucose readings by end of shift  10/7/2023 1426 by Agata Montalvo, RN  Outcome: Progressing  10/7/2023 1425 by Agata Montalvo, RN  Outcome: Progressing   The patient's goals for the shift include Patient will remain safe and free from falls or injury    The clinical goals for the shift include Patient will have improvement in clinical function

## 2023-10-07 NOTE — CARE PLAN
The patient's goals for the shift include Patient will remain safe and free from falls or injury    The clinical goals for the shift include Patient will have improvement in clinical function      Problem: Fall/Injury  Goal: Verbalize understanding of personal risk factors for fall in the hospital  Outcome: Progressing     Problem: Fall/Injury  Goal: Verbalize understanding of risk factor reduction measures to prevent injury from fall in the home  Outcome: Progressing

## 2023-10-07 NOTE — CARE PLAN
Problem: Fall/Injury  Goal: Verbalize understanding of personal risk factors for fall in the hospital  Outcome: Progressing  Goal: Verbalize understanding of risk factor reduction measures to prevent injury from fall in the home  Outcome: Progressing  Goal: Use assistive devices by end of the shift  Outcome: Progressing  Goal: Pace activities to prevent fatigue by end of the shift  Outcome: Progressing   The patient's goals for the shift include Patient will remain safe and free from falls or injury    The clinical goals for the shift include Patient will have improvement in clinical function    Over the shift, the patient did not make progress toward the following goals. Barriers to progression include patient was asleep. Recommendations to address these barriers include patient's cognitive abilities.

## 2023-10-07 NOTE — CARE PLAN
Problem: Diabetes  Goal: Maintain glucose levels >70mg/dl to <250mg/dl throughout shift  Outcome: Progressing   The patient's goals for the shift include Patient will remain safe and free from falls or injury    The clinical goals for the shift include Patient will have improvement in clinical function

## 2023-10-07 NOTE — PROGRESS NOTES
"Miguel Angel Santos is a 73 y.o. male on day 5 of admission presenting after being found down and was diagnosed with hypertensive emergency.    Subjective   Sitting up in bed. No distress.  Patient joking and laughing.  States that he is feeling fine.  States that he does not want to be stuck here too long.         Objective     General: Lying in bed without distress.  Cooperative.  Skin: No rashes ulcerations.  HEENT: Sclera is white.  Mucous membranes moist.  Neck: Supple.  No JVD.  Cardiac: Regular rate and rhythm, S1/S2 distant.  Lungs: Clear to auscultation bilaterally, no wheezing or crackles, no accessory muscle use at rest.  Abdomen: Soft, nontender, nondistended, BS +  Extremities: No cyanosis.  No lower extremity edema.  Neurologic: Alert and oriented to self, to place, only partially to time today, oriented to situation today.  No focal deficits.  Appears to have some fluctuating ability to answer the orientation questions.  Psychiatric: Appropriate mood and behavior.  Currently no agitation.    Last Recorded Vitals  Blood pressure (!) 191/103, pulse 58, temperature 36.7 °C (98.1 °F), resp. rate 15, height 1.93 m (6' 3.98\"), weight 92.1 kg (203 lb 0.7 oz), SpO2 97 %.  On room air.    Intake/Output last 3 Shifts:  I/O last 3 completed shifts:  In: - (0 mL/kg)   Out: 2200 (23.9 mL/kg) [Urine:2200 (0.7 mL/kg/hr)]  Weight: 92.1 kg     Relevant Results  ascorbic acid, 500 mg, oral, Daily  aspirin, 81 mg, oral, Daily  atorvastatin, 40 mg, oral, Daily  carvedilol, 12.5 mg, oral, q12h  cholecalciferol, 25 mcg, oral, Daily  dapagliflozin propanediol, 10 mg, oral, Daily  enoxaparin, 40 mg, subcutaneous, Daily  hydrALAZINE, 50 mg, oral, TID  insulin glargine, 20 Units, subcutaneous, Nightly  insulin lispro, 0-10 Units, subcutaneous, TID with meals  losartan, 50 mg, oral, Daily  metFORMIN, 1,000 mg, oral, BID  multivitamin with minerals, 1 tablet, oral, Daily  ofloxacin, 1 drop, Both Eyes, 4x daily  perflutren protein A " microsphere, 0.5 mL, intravenous, Once in imaging  SITagliptin phosphate, 50 mg, oral, Daily  sulfur hexafluoride microsphr, 2 mL, intravenous, Once in imaging           PRN medications: dextrose 10 % in water (D10W), dextrose, glucagon, hydrALAZINE     Hospital course:  A 72 y/o M patient with PMHx of HTN, DMII per self-report, who presents for a syncopal event. Hx was difficult to obtain however from ED report it was mentioned that he was found down by his daughter and he was covered in urine, he doesn't recall any traumatic event and described it as syncope. His vitals showed BP in 200s/100s, labs showed elevated trops, imaging showed findings suggestive of NPH. Started on cardene drip and admitted to CICU for further monitoring.  Patient improved, weaned off Cardene drip, then moved to medical floor on 10/3.  After transfer patient was alert and pleasantly confused.  In 24 hours patient improved to alert and fully oriented.  Neurology consulted due to family report of shuffling gait with intermittent tremors and reports of progressive mental deterioration over short period of 2 weeks.  Neurology saw the patient and no specific diagnosis, recommended patient follow-up outpatient with neurocognitive specialist.    Assessment/Plan     Syncope  -Currently suspected to be secondary to hypertensive emergency.  -Orthostatic vitals done, patient appears to have a significant drop in blood pressure from lying to sitting, but then recovered upon standing.  Did not have any symptoms.  -Transthoracic echocardiogram done showing EF of 55 to 60% but suboptimal image quality.  No mention of significant valvular issues.    Hypertensive emergency  -Blood pressure significantly elevated on admission.  Patient reports that he has been taking his medication, but at this time unknown if he was truly taking his medications accurately.  Family reports that they are not sure if he truly has been taking it correctly.  -Currently on  Coreg 12.5 mg twice a day, dapagliflozin 10 mg daily, losartan 50 mg daily (increased yesterday), hydralazine 50 mg 3 times a day that was started yesterday.  -Blood pressure yesterday had improved after the changes, this morning elevated again but he has not received his morning medications yet.  Will monitor blood pressure throughout the day.  -Continue IV hydralazine as needed for elevated blood pressure.  -device interrogation of PPM that was placed in 2018 ([employer] [id number] dual-chamber Edora 8DR-7 with serial #[id number] placed on 9/21/2018 [facility]) done yesterday per device nurse.  Report reviewed and no significant arrhythmias.    Type II demand ischemia  -Elevated high-sensitivity troponins on admission but this is likely secondary to hypertensive emergency.  Troponins down trended since admission.    Acute kidney injury  -Creatinine previously 0.99, had increased to 1.49, but has since improved to 1.14 after we stopped one of the diuretics.    -Check BMP tomorrow morning.    Diabetes mellitus type 2  -Patient reports no medications at home.  Reportedly on diet control only, but previously patient appears to have been prescribed medications.  Hemoglobin A1c 9.9.  -Glucose control improved from admission but still not ideal.  -Continue Lantus to 20 units at bedtime, continue mild sliding scale insulin.    -Continue Jardiance 10 mg daily, resume metformin 1000 mg twice a day.  Start Januvia 50 mg daily.    Altered mental status, delirium versus neurocognitive disorder  -PT/OT has recommended skilled nursing facility, patient is in agreement and family is in agreement.  Patient's family is anxious about patient being at home by himself and believes that patient is not able to care for himself well.  -Cognitively patient answering orientation questions better but at the same time there is fluctuation and how well he answers.  -Patient's daughter is reporting poor memory recall with progression of mentation  deterioration over the last 2 weeks.  Reportedly patient also has been having shuffling of his feet and reports of tremors of his hands.  Neurology has seen the patient and recommends outpatient follow-up with neurocognitive specialist.  -MRI of the brain if can be done inpatient, but due to his pacemaker this may be delayed.  If cannot be done inpatient prior to him being ready for discharge to facility, may have to reschedule this outpatient.    Generalized weakness/physical deconditioning  -Currently waiting on accepting facility.    DVT prophylaxis  -Subcu Lovenox 40 mg daily.      Lawanda Puente MD

## 2023-10-07 NOTE — CARE PLAN
The patient's goals for the shift include Patient will remain safe and free from falls or injury    The clinical goals for the shift include patient systoli bp to be less then 180 this shift      Problem: Skin  Goal: Decreased wound size/increased tissue granulation at next dressing change  10/7/2023 1642 by Agata Montalvo RN  Outcome: Progressing  10/7/2023 1425 by Agata Montalvo RN  Outcome: Progressing  10/7/2023 1424 by Agata Montalvo RN  Outcome: Progressing

## 2023-10-08 LAB
GLUCOSE BLD MANUAL STRIP-MCNC: 154 MG/DL (ref 74–99)
GLUCOSE BLD MANUAL STRIP-MCNC: 194 MG/DL (ref 74–99)
GLUCOSE BLD MANUAL STRIP-MCNC: 239 MG/DL (ref 74–99)
GLUCOSE BLD MANUAL STRIP-MCNC: 246 MG/DL (ref 74–99)

## 2023-10-08 PROCEDURE — 2500000002 HC RX 250 W HCPCS SELF ADMINISTERED DRUGS (ALT 637 FOR MEDICARE OP, ALT 636 FOR OP/ED)

## 2023-10-08 PROCEDURE — 97530 THERAPEUTIC ACTIVITIES: CPT | Mod: GP,CQ

## 2023-10-08 PROCEDURE — 97110 THERAPEUTIC EXERCISES: CPT | Mod: GP,CQ

## 2023-10-08 PROCEDURE — 96372 THER/PROPH/DIAG INJ SC/IM: CPT | Performed by: INTERNAL MEDICINE

## 2023-10-08 PROCEDURE — 2500000001 HC RX 250 WO HCPCS SELF ADMINISTERED DRUGS (ALT 637 FOR MEDICARE OP): Performed by: INTERNAL MEDICINE

## 2023-10-08 PROCEDURE — 2500000001 HC RX 250 WO HCPCS SELF ADMINISTERED DRUGS (ALT 637 FOR MEDICARE OP): Performed by: STUDENT IN AN ORGANIZED HEALTH CARE EDUCATION/TRAINING PROGRAM

## 2023-10-08 PROCEDURE — 1100000001 HC PRIVATE ROOM DAILY

## 2023-10-08 PROCEDURE — 99233 SBSQ HOSP IP/OBS HIGH 50: CPT | Performed by: INTERNAL MEDICINE

## 2023-10-08 PROCEDURE — 82947 ASSAY GLUCOSE BLOOD QUANT: CPT

## 2023-10-08 PROCEDURE — 96372 THER/PROPH/DIAG INJ SC/IM: CPT

## 2023-10-08 PROCEDURE — 2500000002 HC RX 250 W HCPCS SELF ADMINISTERED DRUGS (ALT 637 FOR MEDICARE OP, ALT 636 FOR OP/ED): Performed by: INTERNAL MEDICINE

## 2023-10-08 PROCEDURE — 97116 GAIT TRAINING THERAPY: CPT | Mod: GP,CQ

## 2023-10-08 PROCEDURE — 2500000001 HC RX 250 WO HCPCS SELF ADMINISTERED DRUGS (ALT 637 FOR MEDICARE OP)

## 2023-10-08 PROCEDURE — 36415 COLL VENOUS BLD VENIPUNCTURE: CPT | Mod: CMCLAB | Performed by: INTERNAL MEDICINE

## 2023-10-08 PROCEDURE — 2500000004 HC RX 250 GENERAL PHARMACY W/ HCPCS (ALT 636 FOR OP/ED): Performed by: INTERNAL MEDICINE

## 2023-10-08 RX ADMIN — SITAGLIPTIN 50 MG: 50 TABLET, FILM COATED ORAL at 08:53

## 2023-10-08 RX ADMIN — CARVEDILOL 12.5 MG: 12.5 TABLET, FILM COATED ORAL at 05:57

## 2023-10-08 RX ADMIN — INSULIN GLARGINE 20 UNITS: 100 INJECTION, SOLUTION SUBCUTANEOUS at 20:49

## 2023-10-08 RX ADMIN — OFLOXACIN 1 DROP: 3 SOLUTION OPHTHALMIC at 08:53

## 2023-10-08 RX ADMIN — CARVEDILOL 12.5 MG: 12.5 TABLET, FILM COATED ORAL at 17:39

## 2023-10-08 RX ADMIN — HYDRALAZINE HYDROCHLORIDE 75 MG: 50 TABLET, FILM COATED ORAL at 20:50

## 2023-10-08 RX ADMIN — OXYCODONE HYDROCHLORIDE AND ACETAMINOPHEN 500 MG: 500 TABLET ORAL at 08:53

## 2023-10-08 RX ADMIN — METFORMIN HYDROCHLORIDE 1000 MG: 1000 TABLET, FILM COATED ORAL at 20:50

## 2023-10-08 RX ADMIN — DAPAGLIFLOZIN 10 MG: 10 TABLET, FILM COATED ORAL at 08:53

## 2023-10-08 RX ADMIN — ASPIRIN 81 MG: 81 TABLET, COATED ORAL at 08:53

## 2023-10-08 RX ADMIN — INSULIN LISPRO 4 UNITS: 100 INJECTION, SOLUTION INTRAVENOUS; SUBCUTANEOUS at 17:39

## 2023-10-08 RX ADMIN — ENOXAPARIN SODIUM 40 MG: 40 INJECTION SUBCUTANEOUS at 08:52

## 2023-10-08 RX ADMIN — METFORMIN HYDROCHLORIDE 1000 MG: 1000 TABLET, FILM COATED ORAL at 08:53

## 2023-10-08 RX ADMIN — HYDRALAZINE HYDROCHLORIDE 75 MG: 50 TABLET, FILM COATED ORAL at 08:53

## 2023-10-08 RX ADMIN — ATORVASTATIN CALCIUM 40 MG: 40 TABLET, FILM COATED ORAL at 08:53

## 2023-10-08 RX ADMIN — OFLOXACIN 1 DROP: 3 SOLUTION OPHTHALMIC at 12:59

## 2023-10-08 RX ADMIN — LOSARTAN POTASSIUM 50 MG: 50 TABLET, FILM COATED ORAL at 08:52

## 2023-10-08 RX ADMIN — Medication 25 MCG: at 08:53

## 2023-10-08 RX ADMIN — HYDRALAZINE HYDROCHLORIDE 75 MG: 50 TABLET, FILM COATED ORAL at 17:39

## 2023-10-08 RX ADMIN — INSULIN LISPRO 2 UNITS: 100 INJECTION, SOLUTION INTRAVENOUS; SUBCUTANEOUS at 08:54

## 2023-10-08 RX ADMIN — INSULIN LISPRO 2 UNITS: 100 INJECTION, SOLUTION INTRAVENOUS; SUBCUTANEOUS at 12:59

## 2023-10-08 RX ADMIN — Medication 1 TABLET: at 08:52

## 2023-10-08 ASSESSMENT — COGNITIVE AND FUNCTIONAL STATUS - GENERAL
STANDING UP FROM CHAIR USING ARMS: A LITTLE
MOVING FROM LYING ON BACK TO SITTING ON SIDE OF FLAT BED WITH BEDRAILS: A LITTLE
MOVING TO AND FROM BED TO CHAIR: A LITTLE
WALKING IN HOSPITAL ROOM: A LITTLE
CLIMB 3 TO 5 STEPS WITH RAILING: A LOT
TURNING FROM BACK TO SIDE WHILE IN FLAT BAD: A LITTLE
MOBILITY SCORE: 17

## 2023-10-08 ASSESSMENT — PAIN SCALES - GENERAL
PAINLEVEL_OUTOF10: 0 - NO PAIN
PAINLEVEL_OUTOF10: 0 - NO PAIN

## 2023-10-08 ASSESSMENT — PAIN - FUNCTIONAL ASSESSMENT
PAIN_FUNCTIONAL_ASSESSMENT: 0-10
PAIN_FUNCTIONAL_ASSESSMENT: 0-10

## 2023-10-08 NOTE — PROGRESS NOTES
"Miguel Angel Santos is a 73 y.o. male on day 6 of admission presenting after being found down and was diagnosed with hypertensive emergency.    Subjective   Sitting up in bed. No distress.  Patient joking and laughing.  States that he is feeling fine.  No new complaints.      Objective     General: Lying in bed without distress.  Cooperative.  Skin: No rashes ulcerations.  HEENT: Sclera is white.  Mucous membranes moist.  Neck: Supple.  No JVD.  Cardiac: Regular rate and rhythm, S1/S2 distant.  Lungs: Clear to auscultation bilaterally, no wheezing or crackles, no accessory muscle use at rest.  Abdomen: Soft, nontender, nondistended, BS +  Extremities: No cyanosis.  No lower extremity edema.  Neurologic: Alert and oriented to self, to place, to time (but slow to answer month), oriented to situation today.  No focal deficits.  Appears to have some fluctuating ability to answer the orientation questions.  Psychiatric: Appropriate mood and behavior.  Currently no agitation.    Last Recorded Vitals  Blood pressure 137/74, pulse 67, temperature 37.1 °C (98.8 °F), resp. rate 16, height 1.93 m (6' 3.98\"), weight 92.1 kg (203 lb 0.7 oz), SpO2 98 %.  On room air.    Intake/Output last 3 Shifts:  I/O last 3 completed shifts:  In: - (0 mL/kg)   Out: 2100 (22.8 mL/kg) [Urine:2100 (0.6 mL/kg/hr)]  Weight: 92.1 kg     Relevant Results  ascorbic acid, 500 mg, oral, Daily  aspirin, 81 mg, oral, Daily  atorvastatin, 40 mg, oral, Daily  carvedilol, 12.5 mg, oral, q12h  cholecalciferol, 25 mcg, oral, Daily  dapagliflozin propanediol, 10 mg, oral, Daily  enoxaparin, 40 mg, subcutaneous, Daily  hydrALAZINE, 75 mg, oral, TID  insulin glargine, 20 Units, subcutaneous, Nightly  insulin lispro, 0-10 Units, subcutaneous, TID with meals  losartan, 50 mg, oral, Daily  metFORMIN, 1,000 mg, oral, BID  multivitamin with minerals, 1 tablet, oral, Daily  ofloxacin, 1 drop, Both Eyes, 4x daily  perflutren protein A microsphere, 0.5 mL, intravenous, Once " in imaging  SITagliptin phosphate, 50 mg, oral, Daily  sulfur hexafluoride microsphr, 2 mL, intravenous, Once in imaging           PRN medications: dextrose 10 % in water (D10W), dextrose, glucagon, hydrALAZINE     Hospital course:  A 72 y/o M patient with PMHx of HTN, DMII per self-report, who presents for a syncopal event. Hx was difficult to obtain however from ED report it was mentioned that he was found down by his daughter and he was covered in urine, he doesn't recall any traumatic event and described it as syncope. His vitals showed BP in 200s/100s, labs showed elevated trops, imaging showed findings suggestive of NPH. Started on cardene drip and admitted to CICU for further monitoring.  Patient improved, weaned off Cardene drip, then moved to medical floor on 10/3.  After transfer patient was alert and pleasantly confused.  In 24 hours patient improved to alert and fully oriented.  Neurology consulted due to family report of shuffling gait with intermittent tremors and reports of progressive mental deterioration over short period of 2 weeks.  Neurology saw the patient and no specific diagnosis, recommended patient follow-up outpatient with neurocognitive specialist.  Blood pressure medications titrated and blood pressure since has improved.  Diabetes medications titrated to improve glucose.  PT/OT saw the patient and recommended acute rehab, but rehab facilities are denying at this time.  Request has been changed to skilled nursing facility.  Both patient and family in agreement.  Family has significant concerns about this mentation change which they feel is more short-term.  Both neurology and neurosurgery has seen the patient, no urgent recommendations for MRI the brain from either team.  We ordered MRI of the brain here for further evaluation due to family concern, but due to pacemaker the timing of the MRI the brain may not be immediate.  If the patient can have MRI of the brain while we are waiting for  placement then this will be done, but if due to scheduling issues with the pacemaker cannot be done prior to discharge I have informed the family that the MRI the brain will be done outpatient.    Assessment/Plan     Syncope  -Currently suspected to be secondary to hypertensive emergency.  -Orthostatic vitals done, patient appears to have a significant drop in blood pressure from lying to sitting, but then recovered upon standing.  Did not have any symptoms.  -Transthoracic echocardiogram done showing EF of 55 to 60% but suboptimal image quality.  No mention of significant valvular issues.    Hypertensive emergency  -Blood pressure significantly elevated on admission.  Patient reports that he has been taking his medication, but at this time unknown if he was truly taking his medications accurately.  Family reports that they are not sure if he truly has been taking it correctly.  -Currently on Coreg 12.5 mg twice a day, dapagliflozin 10 mg daily, losartan 50 mg daily, hydralazine 75 mg 3 times a day.  -Blood pressure ranging more 130s to 150s.  -Continue IV hydralazine as needed for elevated blood pressure.  -device interrogation of PPM that was placed in 2018 ([employer] [id number] dual-chamber Edora 8DR-7 with serial #[id number] placed on 9/21/2018 at [facility]) done yesterday per device nurse.  Report reviewed and no significant arrhythmias.    Type II demand ischemia  -Elevated high-sensitivity troponins on admission but this is likely secondary to hypertensive emergency.  Troponins down trended since admission.    Acute kidney injury  -Creatinine previously 0.99, had increased to 1.49, but has since improved.  Last creatinine 1.19.  -BMP today is pending.    Diabetes mellitus type 2  -Patient reports no medications at home.  Reportedly on diet control only, but previously patient appears to have been prescribed medications.  Hemoglobin A1c 9.9.  -Glucose control improved from admission.  -Continue Lantus to 20 units at  bedtime, continue mild sliding scale insulin.    -Continue Jardiance 10 mg daily, metformin 1000 mg twice a day, Januvia 50 mg daily.    Altered mental status, delirium versus neurocognitive disorder  -PT/OT has recommended acute rehab initially but patient has been denied, now recommended for skilled nursing facility, patient is in agreement and family is in agreement.  Patient's family is anxious about patient being at home by himself and believes that patient is not able to care for himself well.  Too early to tell at this time, we will see how patient does with further therapy.  -Cognitively patient answering orientation questions better but at the same time there is fluctuation on how well he answers and seems to have intermittent recall issues.  -Patient's daughter is reporting poor memory recall with progression of mentation deterioration over the last 2 weeks.  Reportedly patient also has been having shuffling of his feet and reports of tremors of his hands.  Neurology has seen the patient and recommends outpatient follow-up with neurocognitive specialist.  -MRI of the brain if can be done inpatient, but due to his pacemaker this may be delayed.  If cannot be done inpatient prior to him being ready for discharge to facility, plan to reschedule this outpatient since not urgent.    Generalized weakness/physical deconditioning  -Currently waiting on accepting facility.    DVT prophylaxis  -Subcu Lovenox 40 mg daily.    Disposition: Currently waiting on accepting facility for skilled nursing facility.    Lawanda Puente MD

## 2023-10-08 NOTE — CARE PLAN
Problem: Fall/Injury  Goal: Verbalize understanding of personal risk factors for fall in the hospital  Outcome: Progressing  Goal: Verbalize understanding of risk factor reduction measures to prevent injury from fall in the home  Outcome: Progressing  Goal: Use assistive devices by end of the shift  Outcome: Progressing  Goal: Pace activities to prevent fatigue by end of the shift  Outcome: Progressing   The patient's goals for the shift include Patient will remain safe and free from falls or injury    The clinical goals for the shift include patient systoli bp to be less then 180 this shift    Over the shift, the patient did not make progress toward the following goals. Barriers to progression include cognitive status, sleeping. Recommendations to address these barriers include continued monitoring.

## 2023-10-08 NOTE — PROGRESS NOTES
Physical Therapy    Physical Therapy Treatment    Patient Name: Miguel Angel Santos  MRN: [id number]  Today's Date: 10/8/2023  Time Calculation  Start Time: 1151  Stop Time: 1237  Time Calculation (min): 46 min       Assessment/Plan   PT Assessment  PT Assessment Results: Decreased strength  Rehab Prognosis: Good  Evaluation/Treatment Tolerance: Patient tolerated treatment well  Medical Staff Made Aware: Yes  End of Session Communication: Bedside nurse  End of Session Patient Position: Up in chair, Alarm on  PT Plan  Inpatient/Swing Bed or Outpatient: Inpatient  PT Plan  Treatment/Interventions: Bed mobility, Transfer training, Gait training, Balance training, Neuromuscular re-education, Strengthening, Endurance training, Postural re-education, Therapeutic exercise, Therapeutic activity  PT Plan: Skilled PT  PT Frequency: 4 times per week  PT Discharge Recommendations: High intensity level of continued care  PT Recommended Transfer Status: Assist x2      General Visit Information:   PT  Visit  PT Received On: 10/08/23      Subjective   Precautions:  Precautions  Medical Precautions: Cardiac precautions  Vital Signs:  Vital Signs  Heart Rate: 79  Heart Rate Source: Monitor  BP: 144/89 (126/87 in seated position after walk)  BP Location: Left arm  BP Method: Automatic  Patient Position: Lying    Objective   Pain:  Pain Assessment  Pain Assessment: 0-10  Pain Score: 0 - No pain  Cognition:  Cognition  Overall Cognitive Status: Impaired  Arousal/Alertness: Appropriate responses to stimuli  Orientation Level: Disoriented to place, Disoriented to situation  Following Commands: Follows multistep commands with repetition  Attention: Exceptions to WFL  Alternating Attention: Impaired  Other (Comment):  (Pt requires constant redirection to the task)  Processing Speed: Delayed  Postural Control:  Postural Control  Trunk Control: retropulsion with R lean  Posture Comment: Pt domonstrated improved postural correction this  date    Activity Tolerance:  Activity Tolerance  Endurance: Endurance does not limit participation in activity  Treatments:  Therapeutic Exercise  Therapeutic Exercise Performed: Yes  Therapeutic Exercise Activity 1: seated B LE LAQ, heel raises, hip flextion, DF x15    Bed Mobility  Bed Mobility: Yes  Bed Mobility 1  Bed Mobility 1: Supine to sitting  Level of Assistance 1: Contact guard  Bed Mobility Comments 1: HOB elevated, requires increased time to complete and frequent redirection to the task  Bed Mobility 2  Bed Mobility  2: Sitting to supine  Level of Assistance 2: Moderate assistance  Bed Mobility Comments 2: constant cuing for proper sequencing      Ambulation/Gait Training  Ambulation/Gait Training Performed: Yes  Ambulation/Gait Training 1  Surface 1: Level tile  Device 1: Rolling walker  Gait Support Devices: Gait belt  Assistance 1: Minimum assistance  Quality of Gait 1: Inconsistent stride length (decreased step and stride length, decreased foot clearance.)  Comments/Distance (ft) 1: 25' using FWW, Unable to take steps without AD  Pt requires constant cuing for FWW management.   Transfers  Transfer: Yes  Transfer 1  Transfer From 1: Sit to  Transfer to 1: Stand  Technique 1: Sit to stand  Transfer Device 1: Walker  Transfer Level of Assistance 1: Minimum assistance  Trials/Comments 1: cuing for hand placement with elevated bed  Transfers 2  Transfer From 2: Stand to  Transfer to 2: Sit  Technique 2: Stand to sit  Transfer Device 2: Walker  Transfer Level of Assistance 2: Minimum assistance  Trials/Comments 2: cuing for hand placement to control descent  Transfers 3  Transfer From 3: Chair with arms to  Transfer to 3: Bed  Technique 3: Stand pivot  Transfer Device 3: Gait belt  Transfer Level of Assistance 3: Moderate assistance  Trials/Comments 3: constant cues for hand placement and hip management    Outcome Measures:  [facility] Basic Mobility  Turning from your back to your side while in a flat bed  without using bedrails: A little  Moving from lying on your back to sitting on the side of a flat bed without using bedrails: A little  Moving to and from bed to chair (including a wheelchair): A little  Standing up from a chair using your arms (e.g. wheelchair or bedside chair): A little  To walk in hospital room: A little  Climbing 3-5 steps with railing: A lot  Basic Mobility - Total Score: 17    Education Documentation  Mobility Training, taught by Zaina May PTA at 10/8/2023 12:54 PM.  Learner: Patient  Readiness: Acceptance  Method: Explanation, Demonstration  Response: Verbalizes Understanding, Needs Reinforcement    Education Comments  No comments found.        OP EDUCATION:  Education  Individual(s) Educated: Patient  Education Provided: Home Exercise Program (and FWW management)    Encounter Problems       Encounter Problems (Active)       Balance       STG - Maintains dynamic standing balance with CGA upper extremity support (Progressing)       Start:  10/03/23    Expected End:  10/17/23               Mobility       STG - Patient will ambulate >50 ft with CGA and no assistive device  (Progressing)       Start:  10/03/23    Expected End:  10/17/23               Transfers       STG - Patient will perform bed mobility with the use of bedrails and Jami x1 (Progressing)       Start:  10/03/23    Expected End:  10/17/23            STG - Patient will transfer sit to and from stand with CGA with no assistive device (Progressing)       Start:  10/03/23    Expected End:  10/17/23

## 2023-10-09 LAB
GLUCOSE BLD MANUAL STRIP-MCNC: 137 MG/DL (ref 74–99)
GLUCOSE BLD MANUAL STRIP-MCNC: 141 MG/DL (ref 74–99)
GLUCOSE BLD MANUAL STRIP-MCNC: 142 MG/DL (ref 74–99)
GLUCOSE BLD MANUAL STRIP-MCNC: 148 MG/DL (ref 74–99)

## 2023-10-09 PROCEDURE — 2500000001 HC RX 250 WO HCPCS SELF ADMINISTERED DRUGS (ALT 637 FOR MEDICARE OP)

## 2023-10-09 PROCEDURE — 2500000001 HC RX 250 WO HCPCS SELF ADMINISTERED DRUGS (ALT 637 FOR MEDICARE OP): Performed by: STUDENT IN AN ORGANIZED HEALTH CARE EDUCATION/TRAINING PROGRAM

## 2023-10-09 PROCEDURE — 96372 THER/PROPH/DIAG INJ SC/IM: CPT | Performed by: INTERNAL MEDICINE

## 2023-10-09 PROCEDURE — 97530 THERAPEUTIC ACTIVITIES: CPT | Mod: GO

## 2023-10-09 PROCEDURE — 1100000001 HC PRIVATE ROOM DAILY

## 2023-10-09 PROCEDURE — 82947 ASSAY GLUCOSE BLOOD QUANT: CPT

## 2023-10-09 PROCEDURE — 97535 SELF CARE MNGMENT TRAINING: CPT | Mod: GO

## 2023-10-09 PROCEDURE — 2500000002 HC RX 250 W HCPCS SELF ADMINISTERED DRUGS (ALT 637 FOR MEDICARE OP, ALT 636 FOR OP/ED): Performed by: INTERNAL MEDICINE

## 2023-10-09 PROCEDURE — 2500000004 HC RX 250 GENERAL PHARMACY W/ HCPCS (ALT 636 FOR OP/ED): Performed by: INTERNAL MEDICINE

## 2023-10-09 PROCEDURE — 2500000001 HC RX 250 WO HCPCS SELF ADMINISTERED DRUGS (ALT 637 FOR MEDICARE OP): Performed by: INTERNAL MEDICINE

## 2023-10-09 PROCEDURE — 99232 SBSQ HOSP IP/OBS MODERATE 35: CPT | Performed by: STUDENT IN AN ORGANIZED HEALTH CARE EDUCATION/TRAINING PROGRAM

## 2023-10-09 RX ORDER — HYDRALAZINE HYDROCHLORIDE 50 MG/1
100 TABLET, FILM COATED ORAL 3 TIMES DAILY
Status: DISCONTINUED | OUTPATIENT
Start: 2023-10-09 | End: 2023-10-13 | Stop reason: HOSPADM

## 2023-10-09 RX ADMIN — ENOXAPARIN SODIUM 40 MG: 40 INJECTION SUBCUTANEOUS at 08:40

## 2023-10-09 RX ADMIN — LOSARTAN POTASSIUM 50 MG: 50 TABLET, FILM COATED ORAL at 08:41

## 2023-10-09 RX ADMIN — HYDRALAZINE HYDROCHLORIDE 100 MG: 50 TABLET, FILM COATED ORAL at 21:56

## 2023-10-09 RX ADMIN — CARVEDILOL 12.5 MG: 12.5 TABLET, FILM COATED ORAL at 18:00

## 2023-10-09 RX ADMIN — METFORMIN HYDROCHLORIDE 1000 MG: 1000 TABLET, FILM COATED ORAL at 21:56

## 2023-10-09 RX ADMIN — HYDRALAZINE HYDROCHLORIDE 100 MG: 50 TABLET, FILM COATED ORAL at 16:23

## 2023-10-09 RX ADMIN — DAPAGLIFLOZIN 10 MG: 10 TABLET, FILM COATED ORAL at 08:42

## 2023-10-09 RX ADMIN — OXYCODONE HYDROCHLORIDE AND ACETAMINOPHEN 500 MG: 500 TABLET ORAL at 08:40

## 2023-10-09 RX ADMIN — HYDRALAZINE HYDROCHLORIDE 75 MG: 50 TABLET, FILM COATED ORAL at 08:41

## 2023-10-09 RX ADMIN — CARVEDILOL 12.5 MG: 12.5 TABLET, FILM COATED ORAL at 05:03

## 2023-10-09 RX ADMIN — Medication 1 TABLET: at 08:41

## 2023-10-09 RX ADMIN — METFORMIN HYDROCHLORIDE 1000 MG: 1000 TABLET, FILM COATED ORAL at 08:42

## 2023-10-09 RX ADMIN — Medication 25 MCG: at 08:41

## 2023-10-09 RX ADMIN — ATORVASTATIN CALCIUM 40 MG: 40 TABLET, FILM COATED ORAL at 08:41

## 2023-10-09 RX ADMIN — SITAGLIPTIN 50 MG: 50 TABLET, FILM COATED ORAL at 08:42

## 2023-10-09 RX ADMIN — INSULIN GLARGINE 20 UNITS: 100 INJECTION, SOLUTION SUBCUTANEOUS at 21:57

## 2023-10-09 RX ADMIN — ASPIRIN 81 MG: 81 TABLET, COATED ORAL at 08:41

## 2023-10-09 ASSESSMENT — COGNITIVE AND FUNCTIONAL STATUS - GENERAL
PERSONAL GROOMING: A LITTLE
CLIMB 3 TO 5 STEPS WITH RAILING: A LOT
EATING MEALS: A LITTLE
HELP NEEDED FOR BATHING: A LOT
MOVING FROM LYING ON BACK TO SITTING ON SIDE OF FLAT BED WITH BEDRAILS: A LITTLE
DRESSING REGULAR UPPER BODY CLOTHING: A LITTLE
WALKING IN HOSPITAL ROOM: A LITTLE
WALKING IN HOSPITAL ROOM: A LITTLE
TURNING FROM BACK TO SIDE WHILE IN FLAT BAD: A LITTLE
MOBILITY SCORE: 17
DRESSING REGULAR LOWER BODY CLOTHING: A LOT
MOVING FROM LYING ON BACK TO SITTING ON SIDE OF FLAT BED WITH BEDRAILS: A LITTLE
MOVING TO AND FROM BED TO CHAIR: A LITTLE
EATING MEALS: A LITTLE
MOVING TO AND FROM BED TO CHAIR: A LITTLE
STANDING UP FROM CHAIR USING ARMS: A LITTLE
PERSONAL GROOMING: A LITTLE
CLIMB 3 TO 5 STEPS WITH RAILING: A LOT
PERSONAL GROOMING: A LITTLE
DRESSING REGULAR UPPER BODY CLOTHING: A LITTLE
EATING MEALS: A LITTLE
DRESSING REGULAR LOWER BODY CLOTHING: A LITTLE
DRESSING REGULAR UPPER BODY CLOTHING: A LITTLE
TOILETING: A LITTLE
DAILY ACTIVITIY SCORE: 15
TOILETING: A LOT
DRESSING REGULAR LOWER BODY CLOTHING: A LOT
TURNING FROM BACK TO SIDE WHILE IN FLAT BAD: A LITTLE
TOILETING: A LOT
HELP NEEDED FOR BATHING: A LITTLE
DAILY ACTIVITIY SCORE: 15
HELP NEEDED FOR BATHING: A LOT
STANDING UP FROM CHAIR USING ARMS: A LITTLE

## 2023-10-09 ASSESSMENT — ACTIVITIES OF DAILY LIVING (ADL): HOME_MANAGEMENT_TIME_ENTRY: 10

## 2023-10-09 ASSESSMENT — PAIN SCALES - GENERAL
PAINLEVEL_OUTOF10: 0 - NO PAIN

## 2023-10-09 ASSESSMENT — PAIN - FUNCTIONAL ASSESSMENT
PAIN_FUNCTIONAL_ASSESSMENT: 0-10

## 2023-10-09 NOTE — PROGRESS NOTES
This TCC met with patient at bedside to make aware that [facility] able to accept and precert started. Patient in agreement. Received call from daughter Destiny who is in agreement with plan as well. MD updated that patient will need Covid test ordered. Will continue to monitor for discharge planning needs. Carlee Johns RN, TCC

## 2023-10-09 NOTE — CARE PLAN
The patient's goals for the shift include Patient will remain safe and free from falls or injury    The clinical goals for the shift include Patients BP will remain stable throughout shit      Problem: Fall/Injury  Goal: Verbalize understanding of personal risk factors for fall in the hospital  Outcome: Met  Goal: Verbalize understanding of risk factor reduction measures to prevent injury from fall in the home  Outcome: Met  Goal: Use assistive devices by end of the shift  Outcome: Met  Goal: Pace activities to prevent fatigue by end of the shift  Outcome: Met     Problem: Psychosocial Needs  Goal: Demonstrates ability to cope with hospitalization/illness  Outcome: Met  Goal: Collaborate with me, my family, and caregiver to identify my specific goals  Outcome: Met     Problem: Skin  Goal: Decreased wound size/increased tissue granulation at next dressing change  Outcome: Met  Goal: Participates in plan/prevention/treatment measures  Outcome: Met  Goal: Prevent/manage excess moisture  Outcome: Met  Goal: Prevent/minimize sheer/friction injuries  Outcome: Met  Goal: Promote/optimize nutrition  Outcome: Met  Goal: Promote skin healing  Outcome: Met

## 2023-10-09 NOTE — CARE PLAN
The patient's goals for the shift include Patient will remain safe and free from falls or injury    The clinical goals for the shift include Patients BP will remain stable throughout shit      Problem: Fall/Injury  Goal: Verbalize understanding of personal risk factors for fall in the hospital  Outcome: Met  Goal: Verbalize understanding of risk factor reduction measures to prevent injury from fall in the home  Outcome: Met  Goal: Use assistive devices by end of the shift  Outcome: Met  Goal: Pace activities to prevent fatigue by end of the shift  Outcome: Met     Problem: Psychosocial Needs  Goal: Demonstrates ability to cope with hospitalization/illness  Outcome: Met  Goal: Collaborate with me, my family, and caregiver to identify my specific goals  Outcome: Met

## 2023-10-09 NOTE — CARE PLAN
Problem: Fall/Injury  Goal: Verbalize understanding of personal risk factors for fall in the hospital  10/8/2023 2235 by Lotus Yoo RN  Outcome: Progressing  10/8/2023 2235 by Lotus Yoo RN  Outcome: Progressing  Goal: Verbalize understanding of risk factor reduction measures to prevent injury from fall in the home  10/8/2023 2235 by Lotus Yoo RN  Outcome: Progressing  10/8/2023 2235 by Lotus Yoo RN  Outcome: Progressing  Goal: Use assistive devices by end of the shift  10/8/2023 2235 by Lotus Yoo RN  Outcome: Progressing  10/8/2023 2235 by Lotus Yoo RN  Outcome: Progressing  Goal: Pace activities to prevent fatigue by end of the shift  10/8/2023 2235 by Lotus Yoo RN  Outcome: Progressing  10/8/2023 2235 by Lotus Yoo RN  Outcome: Progressing     Problem: Psychosocial Needs  Goal: Demonstrates ability to cope with hospitalization/illness  10/8/2023 2235 by Lotus Yoo RN  Outcome: Progressing  10/8/2023 2235 by Lotus Yoo RN  Outcome: Progressing  Goal: Collaborate with me, my family, and caregiver to identify my specific goals  Outcome: Progressing     Problem: Skin  Goal: Decreased wound size/increased tissue granulation at next dressing change  Outcome: Progressing  Goal: Participates in plan/prevention/treatment measures  Outcome: Progressing  Goal: Prevent/manage excess moisture  Outcome: Progressing  Goal: Prevent/minimize sheer/friction injuries  Outcome: Progressing  Goal: Promote/optimize nutrition  Outcome: Progressing  Goal: Promote skin healing  Outcome: Progressing     Problem: Diabetes  Goal: Achieve decreasing blood glucose levels by end of shift  Outcome: Progressing  Goal: Increase stability of blood glucose readings by end of shift  Outcome: Progressing  Goal: Decrease in ketones present in urine by end of shift  Outcome: Progressing  Goal: Maintain electrolyte levels within acceptable range throughout shift  Outcome: Progressing  Goal:  Maintain glucose levels >70mg/dl to <250mg/dl throughout shift  Outcome: Progressing  Goal: No changes in neurological exam by end of shift  Outcome: Progressing  Goal: Learn about and adhere to nutrition recommendations by end of shift  Outcome: Progressing  Goal: Vital signs within normal range for age by end of shift  Outcome: Progressing  Goal: Increase self care and/or family involovement by end of shift  Outcome: Progressing  Goal: Receive DSME education by end of shift  Outcome: Progressing     Problem: Discharge Planning  Goal: Discharge to home or other facility with appropriate resources  Outcome: Progressing   The patient's goals for the shift include Patient will remain safe and free from falls or injury    The clinical goals for the shift include Patient will have lower BP readings

## 2023-10-09 NOTE — PROGRESS NOTES
INTERNAL MEDICINE PROGRESS NOTE     BRIEF NARRATIVE      Miguel Angel Santos is a 73 y.o. male on day 7 of admission presenting with Hypertensive emergency.  PMHx of HTN, DMII per self-report, who presents for a syncopal event. Hx was difficult to obtain however from ED report it was mentioned that he was found down by his daughter and he was covered in urine, he doesn't recall any traumatic event and described it as syncope.   PT/OT saw the patient and recommended acute rehab, but rehab facilities are denying at this time.  Request has been changed to skilled nursing facility.  Both patient and family in agreement.  Family has significant concerns about this mentation change which they feel is more short-term.  Both neurology and neurosurgery has seen the patient, no urgent recommendations for MRI the brain from either team.  MRI of the brain here for further evaluation due to family concern, but due to pacemaker the timing of the MRI the brain may not be immediate.   SUBJECTIVE     Pt seen and examined today, no acute events overnight, resting comfortably in bed and NAD. Still mildly hypertensive, labs wnl. Discharge pending placement   OBJECTIVE      Visit Vitals  BP (!) 175/107   Pulse 64   Temp 37 °C (98.6 °F)   Resp 18        Intake/Output Summary (Last 24 hours) at 10/9/2023 1004  Last data filed at 10/9/2023 0400  Gross per 24 hour   Intake --   Output 1600 ml   Net -1600 ml        Physical Exam   General: Lying in bed without distress.  Cooperative.  Skin: No rashes ulcerations.  HEENT: Sclera is white.  Mucous membranes moist.  Neck: Supple.  No JVD.  Cardiac: Regular rate and rhythm, S1/S2 distant.  Lungs: Clear to auscultation bilaterally, no wheezing or crackles, no accessory muscle use at  rest.  Abdomen: Soft, nontender, nondistended, BS +  Extremities: No cyanosis.  No lower extremity edema.  Neurologic: Alert and oriented to self, to place, to time (but slow to answer month), oriented to situation today.  No focal deficits.  Appears to have some fluctuating ability to answer the orientation questions.  Psychiatric: Appropriate mood and behavior.  Currently no agitation.  Current Meds   ascorbic acid, 500 mg, oral, Daily  aspirin, 81 mg, oral, Daily  atorvastatin, 40 mg, oral, Daily  carvedilol, 12.5 mg, oral, q12h  cholecalciferol, 25 mcg, oral, Daily  dapagliflozin propanediol, 10 mg, oral, Daily  enoxaparin, 40 mg, subcutaneous, Daily  hydrALAZINE, 75 mg, oral, TID  insulin glargine, 20 Units, subcutaneous, Nightly  insulin lispro, 0-10 Units, subcutaneous, TID with meals  losartan, 50 mg, oral, Daily  metFORMIN, 1,000 mg, oral, BID  multivitamin with minerals, 1 tablet, oral, Daily  perflutren protein A microsphere, 0.5 mL, intravenous, Once in imaging  SITagliptin phosphate, 50 mg, oral, Daily  sulfur hexafluoride microsphr, 2 mL, intravenous, Once in imaging       PRN medications: dextrose 10 % in water (D10W), dextrose, glucagon, hydrALAZINE     LABS and IMAGING     Bicarbonate   Date Value Ref Range Status   10/07/2023 27 21 - 32 mmol/L Final     Creatinine   Date Value Ref Range Status   10/07/2023 1.19 0.50 - 1.30 mg/dL Final     Calcium   Date Value Ref Range Status   10/07/2023 9.7 8.6 - 10.6 mg/dL Final     Imaging reviewed      ASSESSMENT / PLANS      Syncope  -Currently suspected to be secondary to hypertensive emergency.  -Orthostatic vitals done, patient appears to have a significant drop in blood pressure from lying to sitting, but then recovered upon standing.  Did not have any symptoms.  -Transthoracic echocardiogram done showing EF of 55 to 60% but suboptimal image quality.  No mention of significant valvular issues.     Hypertensive emergency  -Blood pressure significantly  elevated on admission.  Patient reports that he has been taking his medication, but at this time unknown if he was truly taking his medications accurately.  Family reports that they are not sure if he truly has been taking it correctly.  -Currently on Coreg 12.5 mg twice a day, dapagliflozin 10 mg daily, losartan 50 mg daily, hydralazine 75 mg 3 times a day.  -BP still uncontrolled   -Continue IV hydralazine as needed for elevated blood pressure.  -device interrogation of PPM that was placed in 2018 ([id number] dual-chamber [place] 8DR-7 with serial #[id number] placed on 9/21/2018 at [facility] [place]) done yesterday per device nurse.  Report reviewed and no significant arrhythmias.     Type II demand ischemia  -Elevated high-sensitivity troponins on admission but this is likely secondary to hypertensive emergency.  Troponins down trended since admission.     Acute kidney injury  -Creatinine previously 0.99, had increased to 1.49, but has since improved.  Last creatinine 1.19.  -BMP today is pending.     Diabetes mellitus type 2  -Patient reports no medications at home.  Reportedly on diet control only, but previously patient appears to have been prescribed medications.  Hemoglobin A1c 9.9.  -Glucose control improved from admission.  -Continue Lantus to 20 units at bedtime, continue mild sliding scale insulin.    -Continue Jardiance 10 mg daily, metformin 1000 mg twice a day, Januvia 50 mg daily.     Altered mental status, delirium versus neurocognitive disorder  -PT/OT has recommended acute rehab initially but patient has been denied, now recommended for skilled nursing facility, patient is in agreement and family is in agreement.  Patient's family is anxious about patient being at home by himself and believes that patient is not able to care for himself well.  Too early to tell at this time, we will see how patient does with further therapy.  -Cognitively patient answering orientation questions better but at the same time  there is fluctuation on how well he answers and seems to have intermittent recall issues.  -Patient's daughter is reporting poor memory recall with progression of mentation deterioration over the last 2 weeks.  Reportedly patient also has been having shuffling of his feet and reports of tremors of his hands.  Neurology has seen the patient and recommends outpatient follow-up with neurocognitive specialist.  -MRI of the brain if can be done inpatient, but due to his pacemaker this may be delayed.  If cannot be done inpatient prior to him being ready for discharge to facility, plan to reschedule this outpatient since not urgent.     Generalized weakness/physical deconditioning  -Currently waiting on accepting facility.     DVT prophylaxis  -Subcu Lovenox 40 mg daily.     Disposition: Currently awaiting accepting facility for skilled nursing facility.    Ree Reynolds MD

## 2023-10-09 NOTE — CARE PLAN
Problem: COGNITION/SAFETY  Goal: Patient will follow  Simple GM commands to allow improved ADL performance.  Outcome: Progressing  Goal: Pt will score WFL on cognitive assessment for determination of safety with independent ADL tasks.  Outcome: Progressing     Problem: ADLs  Goal: Patient will perform UB and LB bathing with supervision level of assistance and extended tub bench and long-handled sponge.  Outcome: Progressing  Goal: Patient with complete upper body dressing with modified independent level of assistance donning and doffing pullover shirt with no adaptive equipment while edge of bed   Outcome: Progressing  Goal: Patient with complete lower body dressing with modified independent level of assistance donning and doffing all LE clothes  with reacher, sock-aid, dressing stick , and elastic shoe laces while edge of bed   Outcome: Progressing  Goal: Patient will complete daily grooming tasks brushing teeth and shaving with mod I level of assistance and PRN adaptive equipment while edge of bed .  Outcome: Progressing  Goal: Patient will complete toileting including hygiene clothing management/hygiene with SBA level of assistance and raised toilet seat.  Outcome: Progressing     Problem: TRANSFERS  Goal: Patient will perform bed mobility modified independent level of assistance and bed rails in order to improve safety and independence with mobility  Outcome: Progressing  Goal: Patient will complete functional transfer with least restrictive device with modified independent level of assistance.  Outcome: Progressing

## 2023-10-09 NOTE — PROGRESS NOTES
"Occupational Therapy    OT Treatment    SLUMS cognitive assessment administered this session. Pt scored an 11 out of 30 possible points on assessment. Score within the category of \"dementia\" on assessment. Pt with very poor insight into deficits at this time, denying any cognitive deficits. Strongly advising 24 hr supervision for safety at discharge.     Patient Name: Miguel Angel Santos  MRN: [id number]  Today's Date: 10/9/2023  Time Calculation  Start Time: 1400  Stop Time: 1430  Time Calculation (min): 30 min     Plan:  Treatment Interventions: ADL retraining, Functional transfer training, Endurance training, Cognitive reorientation  OT Frequency: 3 times per week  OT Discharge Recommendations: High intensity level of continued care  OT Recommended Transfer Status: Assist of 1  Treatment Interventions: ADL retraining, Functional transfer training, Endurance training, Cognitive reorientation    Subjective   General:  OT Received On: 10/09/23  Reason for Referral: presents for a syncopal event  Past Medical History Relevant to Rehab: 73 y.o. male presenting with PMHx of HTN, DMII  Family/Caregiver Present: No  Prior to Session Communication: Bedside nurse  Patient Position Received: Bed, 2 rail up  General Comment: Pt in chair at end of session with alarm on. Pt overall with significant cognitive deficits primarily with problem-solving, short term memory recall. Pt often distracted and overall thoughts disconnected.    Pain:  Pain Assessment  Pain Assessment: 0-10  Pain Score: 0 - No pain    Objective    Activities of Daily Living:    UE Dressing  UE Dressing Level of Assistance: Minimum assistance  UE Dressing Where Assessed: Edge of bed  UE Dressing Comments: gown management  Toileting  Toileting Level of Assistance:  (pt with amy at this time)    Bed Mobility/Transfers: Bed Mobility  Bed Mobility: Yes  Bed Mobility 1  Bed Mobility 1: Supine to sitting  Level of Assistance 1: Minimum assistance  Bed Mobility " Comments 1: HOB elevated, verbal cueing for mechanics and positioning, verbal cueing for use of bed rails to assist. Support at trunk to achieve upright positioning.    Transfers  Transfer: Yes  Transfer 1  Transfer From 1: Sit to  Transfer to 1: Stand  Technique 1: Sit to stand  Transfer Device 1: Walker  Transfer Level of Assistance 1: Minimum assistance  Trials/Comments 1: Bed height elevated, pt requires repetition of insructions for safety: pt frequently placing BUEs on walker despite education on proper transfer technique.  Transfers 2  Transfer From 2: Bed to  Transfer to 2: Chair with arms  Transfer Device 2: Walker  Transfer Level of Assistance 2: Minimum assistance  Trials/Comments 2: Maximal verbal cueing for mobility sequence and positioning within walker.  Transfers 3  Transfer From 3: Stand to  Transfer to 3: Sit  Transfer Device 3: Walker  Transfer Level of Assistance 3: Minimum assistance  Trials/Comments 3: Tactile cueing for hand placement for controlled seated transition    Outcome Measures:[facility] Daily Activity  Putting on and taking off regular lower body clothing: A lot  Bathing (including washing, rinsing, drying): A lot  Putting on and taking off regular upper body clothing: A little  Toileting, which includes using toilet, bedpan or urinal: A lot  Taking care of personal grooming such as brushing teeth: A little  Eating Meals: A little  Daily Activity - Total Score: 15    Education Documentation  Precautions, taught by Katheryn Rizvi OT at 10/9/2023  2:54 PM.  Learner: Patient  Readiness: Acceptance  Method: Explanation, Demonstration  Response: Needs Reinforcement    ADL Training, taught by Katheryn Rizvi OT at 10/9/2023  2:54 PM.  Learner: Patient  Readiness: Acceptance  Method: Explanation, Demonstration  Response: Needs Reinforcement    EDUCATION:  Education  Individual(s) Educated: Patient  Education Provided: Fall precautons  Education Comment: Increased education provided on  importance of having assistance with transfers for reducing fall risk.    Goals:  Encounter Problems       Encounter Problems (Active)       ADLs       Patient will perform UB and LB bathing with supervision level of assistance and extended tub bench and long-handled sponge. (Progressing)       Start:  10/05/23    Expected End:  10/19/23            Patient with complete upper body dressing with modified independent level of assistance donning and doffing pullover shirt with no adaptive equipment while edge of bed  (Progressing)       Start:  10/05/23    Expected End:  10/19/23            Patient with complete lower body dressing with modified independent level of assistance donning and doffing all LE clothes  with reacher, sock-aid, dressing stick , and elastic shoe laces while edge of bed  (Progressing)       Start:  10/05/23    Expected End:  10/19/23            Patient will complete daily grooming tasks brushing teeth and shaving with mod I level of assistance and PRN adaptive equipment while edge of bed . (Progressing)       Start:  10/05/23    Expected End:  10/19/23            Patient will complete toileting including hygiene clothing management/hygiene with SBA level of assistance and raised toilet seat. (Progressing)       Start:  10/05/23    Expected End:  10/19/23               BALANCE: AS TOLERATED PATIENT WILL PARTICIPATE IN STATIC AND DYNAMIC SIT/STANDING BALANCE RETRAINING TO INCREASE SAFETY FOR I/ADLS AND FXAL TF.          COGNITION/SAFETY       Patient will follow  Simple GM commands to allow improved ADL performance. (Progressing)       Start:  10/05/23    Expected End:  10/19/23            Pt will score WFL on cognitive assessment for determination of safety with independent ADL tasks. (Progressing)       Start:  10/09/23    Expected End:  10/19/23                     TRANSFERS       Patient will perform bed mobility modified independent level of assistance and bed rails in order to improve safety  and independence with mobility (Progressing)       Start:  10/05/23    Expected End:  10/19/23            Patient will complete functional transfer with least restrictive device with modified independent level of assistance. (Progressing)       Start:  10/05/23    Expected End:  10/19/23

## 2023-10-10 ENCOUNTER — APPOINTMENT (OUTPATIENT)
Dept: RADIOLOGY | Facility: HOSPITAL | Age: 73
DRG: 305 | End: 2023-10-10
Payer: MEDICARE

## 2023-10-10 ENCOUNTER — APPOINTMENT (OUTPATIENT)
Dept: CARDIOLOGY | Facility: HOSPITAL | Age: 73
DRG: 305 | End: 2023-10-10
Payer: MEDICARE

## 2023-10-10 LAB
GLUCOSE BLD MANUAL STRIP-MCNC: 129 MG/DL (ref 74–99)
GLUCOSE BLD MANUAL STRIP-MCNC: 146 MG/DL (ref 74–99)
GLUCOSE BLD MANUAL STRIP-MCNC: 168 MG/DL (ref 74–99)
GLUCOSE BLD MANUAL STRIP-MCNC: 200 MG/DL (ref 74–99)
SARS-COV-2 RNA RESP QL NAA+PROBE: NOT DETECTED

## 2023-10-10 PROCEDURE — 2550000001 HC RX 255 CONTRASTS: Performed by: STUDENT IN AN ORGANIZED HEALTH CARE EDUCATION/TRAINING PROGRAM

## 2023-10-10 PROCEDURE — 2500000004 HC RX 250 GENERAL PHARMACY W/ HCPCS (ALT 636 FOR OP/ED): Performed by: INTERNAL MEDICINE

## 2023-10-10 PROCEDURE — 99232 SBSQ HOSP IP/OBS MODERATE 35: CPT | Performed by: STUDENT IN AN ORGANIZED HEALTH CARE EDUCATION/TRAINING PROGRAM

## 2023-10-10 PROCEDURE — 2500000004 HC RX 250 GENERAL PHARMACY W/ HCPCS (ALT 636 FOR OP/ED): Performed by: STUDENT IN AN ORGANIZED HEALTH CARE EDUCATION/TRAINING PROGRAM

## 2023-10-10 PROCEDURE — 93289 INTERROG DEVICE EVAL HEART: CPT

## 2023-10-10 PROCEDURE — 97530 THERAPEUTIC ACTIVITIES: CPT | Mod: GP

## 2023-10-10 PROCEDURE — 93286 PERI-PX EVAL PM/LDLS PM IP: CPT | Performed by: INTERNAL MEDICINE

## 2023-10-10 PROCEDURE — 2500000001 HC RX 250 WO HCPCS SELF ADMINISTERED DRUGS (ALT 637 FOR MEDICARE OP): Performed by: STUDENT IN AN ORGANIZED HEALTH CARE EDUCATION/TRAINING PROGRAM

## 2023-10-10 PROCEDURE — 82947 ASSAY GLUCOSE BLOOD QUANT: CPT

## 2023-10-10 PROCEDURE — 2500000002 HC RX 250 W HCPCS SELF ADMINISTERED DRUGS (ALT 637 FOR MEDICARE OP, ALT 636 FOR OP/ED): Performed by: INTERNAL MEDICINE

## 2023-10-10 PROCEDURE — 96372 THER/PROPH/DIAG INJ SC/IM: CPT | Performed by: INTERNAL MEDICINE

## 2023-10-10 PROCEDURE — 93279 PRGRMG DEV EVAL PM/LDLS PM: CPT

## 2023-10-10 PROCEDURE — 87635 SARS-COV-2 COVID-19 AMP PRB: CPT | Performed by: STUDENT IN AN ORGANIZED HEALTH CARE EDUCATION/TRAINING PROGRAM

## 2023-10-10 PROCEDURE — G1004 CDSM NDSC: HCPCS

## 2023-10-10 PROCEDURE — 1100000001 HC PRIVATE ROOM DAILY

## 2023-10-10 PROCEDURE — A9575 INJ GADOTERATE MEGLUMI 0.1ML: HCPCS | Performed by: STUDENT IN AN ORGANIZED HEALTH CARE EDUCATION/TRAINING PROGRAM

## 2023-10-10 PROCEDURE — 2500000001 HC RX 250 WO HCPCS SELF ADMINISTERED DRUGS (ALT 637 FOR MEDICARE OP): Performed by: INTERNAL MEDICINE

## 2023-10-10 PROCEDURE — 2500000001 HC RX 250 WO HCPCS SELF ADMINISTERED DRUGS (ALT 637 FOR MEDICARE OP)

## 2023-10-10 RX ORDER — GADOTERATE MEGLUMINE 376.9 MG/ML
20 INJECTION INTRAVENOUS
Status: COMPLETED | OUTPATIENT
Start: 2023-10-10 | End: 2023-10-10

## 2023-10-10 RX ORDER — LOSARTAN POTASSIUM 100 MG/1
100 TABLET ORAL DAILY
Status: DISCONTINUED | OUTPATIENT
Start: 2023-10-11 | End: 2023-10-13 | Stop reason: HOSPADM

## 2023-10-10 RX ADMIN — Medication 25 MCG: at 08:41

## 2023-10-10 RX ADMIN — OXYCODONE HYDROCHLORIDE AND ACETAMINOPHEN 500 MG: 500 TABLET ORAL at 08:41

## 2023-10-10 RX ADMIN — HYDRALAZINE HYDROCHLORIDE 100 MG: 50 TABLET, FILM COATED ORAL at 07:17

## 2023-10-10 RX ADMIN — HYDRALAZINE HYDROCHLORIDE 100 MG: 50 TABLET, FILM COATED ORAL at 21:29

## 2023-10-10 RX ADMIN — HYDRALAZINE HYDROCHLORIDE 100 MG: 50 TABLET, FILM COATED ORAL at 16:22

## 2023-10-10 RX ADMIN — METFORMIN HYDROCHLORIDE 1000 MG: 1000 TABLET, FILM COATED ORAL at 08:41

## 2023-10-10 RX ADMIN — INSULIN GLARGINE 20 UNITS: 100 INJECTION, SOLUTION SUBCUTANEOUS at 21:27

## 2023-10-10 RX ADMIN — ATORVASTATIN CALCIUM 40 MG: 40 TABLET, FILM COATED ORAL at 08:40

## 2023-10-10 RX ADMIN — ENOXAPARIN SODIUM 40 MG: 40 INJECTION SUBCUTANEOUS at 08:42

## 2023-10-10 RX ADMIN — Medication 1 TABLET: at 08:40

## 2023-10-10 RX ADMIN — GADOTERATE MEGLUMINE 18 ML: 376.9 INJECTION INTRAVENOUS at 12:25

## 2023-10-10 RX ADMIN — LOSARTAN POTASSIUM 50 MG: 50 TABLET, FILM COATED ORAL at 07:17

## 2023-10-10 RX ADMIN — CARVEDILOL 12.5 MG: 12.5 TABLET, FILM COATED ORAL at 17:32

## 2023-10-10 RX ADMIN — DAPAGLIFLOZIN 10 MG: 10 TABLET, FILM COATED ORAL at 08:41

## 2023-10-10 RX ADMIN — HYDRALAZINE HYDROCHLORIDE 10 MG: 20 INJECTION INTRAMUSCULAR; INTRAVENOUS at 05:28

## 2023-10-10 RX ADMIN — ASPIRIN 81 MG: 81 TABLET, COATED ORAL at 08:40

## 2023-10-10 RX ADMIN — METFORMIN HYDROCHLORIDE 1000 MG: 1000 TABLET, FILM COATED ORAL at 21:30

## 2023-10-10 RX ADMIN — SITAGLIPTIN 50 MG: 50 TABLET, FILM COATED ORAL at 08:41

## 2023-10-10 RX ADMIN — CARVEDILOL 12.5 MG: 12.5 TABLET, FILM COATED ORAL at 05:33

## 2023-10-10 ASSESSMENT — PAIN SCALES - GENERAL
PAINLEVEL_OUTOF10: 0 - NO PAIN

## 2023-10-10 ASSESSMENT — PAIN - FUNCTIONAL ASSESSMENT
PAIN_FUNCTIONAL_ASSESSMENT: 0-10

## 2023-10-10 ASSESSMENT — COGNITIVE AND FUNCTIONAL STATUS - GENERAL
DRESSING REGULAR LOWER BODY CLOTHING: A LOT
TURNING FROM BACK TO SIDE WHILE IN FLAT BAD: A LITTLE
WALKING IN HOSPITAL ROOM: A LITTLE
MOBILITY SCORE: 16
DRESSING REGULAR UPPER BODY CLOTHING: A LITTLE
DRESSING REGULAR LOWER BODY CLOTHING: A LOT
STANDING UP FROM CHAIR USING ARMS: A LITTLE
MOVING TO AND FROM BED TO CHAIR: A LITTLE
DRESSING REGULAR UPPER BODY CLOTHING: A LITTLE
MOBILITY SCORE: 16
HELP NEEDED FOR BATHING: A LOT
CLIMB 3 TO 5 STEPS WITH RAILING: A LOT
TOILETING: A LOT
MOVING FROM LYING ON BACK TO SITTING ON SIDE OF FLAT BED WITH BEDRAILS: A LITTLE
TURNING FROM BACK TO SIDE WHILE IN FLAT BAD: A LITTLE
MOVING TO AND FROM BED TO CHAIR: A LITTLE
MOVING TO AND FROM BED TO CHAIR: A LITTLE
WALKING IN HOSPITAL ROOM: A LITTLE
PERSONAL GROOMING: A LITTLE
TOILETING: TOTAL
HELP NEEDED FOR BATHING: A LOT
EATING MEALS: A LITTLE
TURNING FROM BACK TO SIDE WHILE IN FLAT BAD: A LITTLE
CLIMB 3 TO 5 STEPS WITH RAILING: A LOT
CLIMB 3 TO 5 STEPS WITH RAILING: TOTAL
WALKING IN HOSPITAL ROOM: A LOT
MOVING FROM LYING ON BACK TO SITTING ON SIDE OF FLAT BED WITH BEDRAILS: A LITTLE
STANDING UP FROM CHAIR USING ARMS: A LITTLE
DAILY ACTIVITIY SCORE: 16
MOVING FROM LYING ON BACK TO SITTING ON SIDE OF FLAT BED WITH BEDRAILS: A LITTLE
STANDING UP FROM CHAIR USING ARMS: A LITTLE

## 2023-10-10 NOTE — PROGRESS NOTES
Physical Therapy    Physical Therapy Treatment    Patient Name: Miguel Angel Santos  MRN: [id number]  Today's Date: 10/10/2023  Time Calculation  Start Time: 0852  Stop Time: 0917  Time Calculation (min): 25 min       Assessment/Plan   PT Assessment  PT Assessment Results: Decreased strength  Rehab Prognosis: Good  Evaluation/Treatment Tolerance: Patient tolerated treatment well  Medical Staff Made Aware: Yes  End of Session Communication: Bedside nurse  End of Session Patient Position: Up in chair, Alarm on  PT Plan  Inpatient/Swing Bed or Outpatient: Inpatient  PT Plan  Treatment/Interventions: Bed mobility, Transfer training, Gait training, Balance training, Neuromuscular re-education, Strengthening, Endurance training, Postural re-education, Therapeutic exercise, Therapeutic activity  PT Plan: Skilled PT  PT Frequency: 4 times per week  PT Discharge Recommendations: High intensity level of continued care  PT Recommended Transfer Status: Assist x2      General Visit Information:   PT  Visit  PT Received On: 10/10/23  General  Prior to Session Communication: Bedside nurse  Patient Position Received: Up in chair, Alarm on  General Comment:  (pt cooperative with cueing, wanting to eat breakfast but agreed to 10 minutes of therapy for precert.)    Subjective   Precautions:  Precautions  Medical Precautions: Fall precautions    Objective   Pain:  Pain Assessment  Pain Assessment:  (denies pain)  Cognition:  Cognition  Overall Cognitive Status:  (Ao x4, but required frequent redirection and cueing)    Activity Tolerance:  Activity Tolerance  Endurance: Tolerates 10 - 20 min exercise with multiple rests  Treatments:  Bed Mobility  Bed Mobility: No (pt in chair)    Ambulation/Gait Training  Ambulation/Gait Training Performed: Yes  Ambulation/Gait Training 1  Surface 1: Level tile  Device 1: Rolling walker  Assistance 1: Minimum assistance  Quality of Gait 1: Inconsistent stride length  Comments/Distance (ft) 1: 6' (attempted  "backwards step to chair, pt unable to step backwards reporting \"how quickly your body forgets how to walk\". Chair brought behind patient to sit)  Transfers  Transfer: Yes  Transfer 1  Transfer From 1: Sit to, Stand to  Transfer to 1: Sit, Stand  Technique 1: Sit to stand, Stand to sit  Transfer Device 1: Walker  Transfer Level of Assistance 1: Minimum assistance  Trials/Comments 1:  (patient stood twice from chair with cues for hand placement, demonstrated recalled between stands. Patient urinated on self and ground on initial stand, required patient to sit down to clean pt and floor, doff and don a new gown, etc.)    Outcome Measures:  [facility] Basic Mobility  Turning from your back to your side while in a flat bed without using bedrails: A little  Moving from lying on your back to sitting on the side of a flat bed without using bedrails: A little  Moving to and from bed to chair (including a wheelchair): A little  Standing up from a chair using your arms (e.g. wheelchair or bedside chair): A little  To walk in hospital room: A little  Climbing 3-5 steps with railing: Total  Basic Mobility - Total Score: 16        Encounter Problems       Encounter Problems (Active)       Balance       STG - Maintains dynamic standing balance with CGA upper extremity support (Progressing)       Start:  10/03/23    Expected End:  10/17/23               Mobility       STG - Patient will ambulate >50 ft with CGA and no assistive device  (Progressing)       Start:  10/03/23    Expected End:  10/17/23               Transfers       STG - Patient will perform bed mobility with the use of bedrails and Jami x1 (Progressing)       Start:  10/03/23    Expected End:  10/17/23            STG - Patient will transfer sit to and from stand with CGA with no assistive device (Progressing)       Start:  10/03/23    Expected End:  10/17/23                   "

## 2023-10-10 NOTE — PROGRESS NOTES
.                                                                                                                                                                                                                            INTERNAL MEDICINE PROGRESS NOTE     BRIEF NARRATIVE      Miguel Angel Santos is a 73 y.o. male on day 8 of admission presenting with Hypertensive emergency.  PMHx of HTN, DMII per self-report, who presents for a syncopal event. Hx was difficult to obtain however from ED report it was mentioned that he was found down by his daughter and he was covered in urine, he doesn't recall any traumatic event and described it as syncope.   PT/OT saw the patient and recommended acute rehab, but rehab facilities are denying at this time.  Request has been changed to skilled nursing facility.  Both patient and family in agreement.  Family has significant concerns about this mentation change which they feel is more short-term.  Both neurology and neurosurgery has seen the patient, no urgent recommendations for MRI the brain from either team. Scheduled for MRI brain today. BP still uncontrolled, uptitrating meds     SUBJECTIVE     Pt seen and examined today, sitting in chair this morning in NAD. Patient still mildly hypertensive. No other complaints     OBJECTIVE      Visit Vitals  /74   Pulse 63   Temp 36.6 °C (97.9 °F)   Resp (!) 43        Intake/Output Summary (Last 24 hours) at 10/10/2023 0928  Last data filed at 10/10/2023 0839  Gross per 24 hour   Intake --   Output 1200 ml   Net -1200 ml        Physical Exam   General: Lying in bed without distress.  Cooperative.  Skin: No rashes ulcerations.  HEENT: Sclera is white.  Mucous membranes moist.  Neck: Supple.  No JVD.  Cardiac: Regular rate and rhythm, S1/S2 distant.  Lungs: Clear to auscultation bilaterally, no wheezing or crackles, no accessory muscle use at rest.  Abdomen: Soft, nontender, nondistended, BS +  Extremities: No cyanosis.  No lower extremity  "edema.  Neurologic: Alert and oriented to self, to place, to time (but slow to answer month), oriented to situation today.  No focal deficits.  Appears to have some fluctuating ability to answer the orientation questions.  Psychiatric: Appropriate mood and behavior.  Currently no agitation.    Current Meds   ascorbic acid, 500 mg, oral, Daily  aspirin, 81 mg, oral, Daily  atorvastatin, 40 mg, oral, Daily  carvedilol, 12.5 mg, oral, q12h  cholecalciferol, 25 mcg, oral, Daily  dapagliflozin propanediol, 10 mg, oral, Daily  enoxaparin, 40 mg, subcutaneous, Daily  hydrALAZINE, 100 mg, oral, TID  insulin glargine, 20 Units, subcutaneous, Nightly  insulin lispro, 0-10 Units, subcutaneous, TID with meals  [START ON 10/11/2023] losartan, 100 mg, oral, Daily  metFORMIN, 1,000 mg, oral, BID  multivitamin with minerals, 1 tablet, oral, Daily  perflutren protein A microsphere, 0.5 mL, intravenous, Once in imaging  SITagliptin phosphate, 50 mg, oral, Daily  sulfur hexafluoride microsphr, 2 mL, intravenous, Once in imaging       PRN medications: dextrose 10 % in water (D10W), dextrose, glucagon, hydrALAZINE     LABS and IMAGING     No results found for: \"WBC\", \"HGB\", \"HCT\", \"GLU\", \"CO2\", \"CREATININE\", \"CALCIUM\", \"INR\", \"PTT\", \"TROPONINI\"    Imaging reviewed      ASSESSMENT / PLANS      Syncope  -Currently suspected to be secondary to hypertensive emergency.  -Orthostatic vitals done, patient appears to have a significant drop in blood pressure from lying to sitting, but then recovered upon standing.  Did not have any symptoms.  -Transthoracic echocardiogram done showing EF of 55 to 60% but suboptimal image quality.  No mention of significant valvular issues.     Hypertensive emergency  -Blood pressure significantly elevated on admission.  Patient reports that he has been taking his medication, but at this time unknown if he was truly taking his medications accurately.  Family reports that they are not sure if he truly has been " taking it correctly.  -Currently on Coreg 12.5 mg twice a day, dapagliflozin 10 mg daily, losartan 100 mg daily, hydralazine 75 mg 3 times a day.  -BP better controlled   -Continue IV hydralazine as needed for elevated blood pressure.  -device interrogation of PPM that was placed in 2018 (CellmaxroniTengion [id number] dual-chamber Edora 8DR-7 with serial #[id number] placed on 9/21/2018 at [facility]) done yesterday per device nurse.  Report reviewed and no significant arrhythmias.     Type II demand ischemia  -Elevated high-sensitivity troponins on admission but this is likely secondary to hypertensive emergency.  Troponins down trended since admission.     Acute kidney injury  -Creatinine previously 0.99, had increased to 1.49, but has since improved.  Last creatinine 1.19.  -BMP today is pending.     Diabetes mellitus type 2  -Patient reports no medications at home.  Reportedly on diet control only, but previously patient appears to have been prescribed medications.  Hemoglobin A1c 9.9.  -Glucose control improved from admission.  -Continue Lantus to 20 units at bedtime, continue mild sliding scale insulin.    -Continue Jardiance 10 mg daily, metformin 1000 mg twice a day, Januvia 50 mg daily.     Altered mental status, delirium versus neurocognitive disorder  -PT/OT has recommended acute rehab initially but patient has been denied, now recommended for skilled nursing facility, patient is in agreement and family is in agreement.  Patient's family is anxious about patient being at home by himself and believes that patient is not able to care for himself well.  Too early to tell at this time, we will see how patient does with further therapy.  -Cognitively patient answering orientation questions better but at the same time there is fluctuation on how well he answers and seems to have intermittent recall issues.  -Patient's daughter is reporting poor memory recall with progression of mentation deterioration over the last 2 weeks.   Reportedly patient also has been having shuffling of his feet and reports of tremors of his hands.  Neurology has seen the patient and recommends outpatient follow-up with neurocognitive specialist.  -MRI of the brain today      Generalized weakness/physical deconditioning  -Currently waiting on accepting facility.     DVT prophylaxis  -Subcu Lovenox 40 mg daily.     Disposition: Discharge pending SNF acceptance. Follow MRI brain result     Ree Reynolds MD

## 2023-10-10 NOTE — NURSING NOTE
1257 Device clinic nurse returns to MRI to reset pt. Pacemaker to his previous setting; pt. voices  no complaints of chest pain or any adverse affects during MRI scan. GARRETT White RN.

## 2023-10-10 NOTE — PROGRESS NOTES
This TCC spoke with daughter Destiny. Daughter updated that patient was down for MRI Brain. Daughter questioning what would need to be brought to patient at acute rehab facility. Encouraged daughter to bring loose fitting close and tennis shoes. Daughter updated that precert still pending. Will continue to monitor for discharge planning needs. Carlee Johns RN, TCC    Update 1530: Received update from acute rehab that insurance denied and peer to peer option available. Peer to peer: [phone] option 4. Needs to be completed by 10/11 @ 1200. Reference #: [id number]. Dr Reynolds updated and to complete. Will continue to monitor for discharge planning needs. Carlee Johns RN, TCC

## 2023-10-10 NOTE — NURSING NOTE
6048: Paged Dr. Ramos regarding patient's elevated. BP was 198/115 HR 64 at 0522 in Right arm, gave patient PRN Hydralazine and scheduled Coreg ordered. Just rechecked /103 HR 66 Right arm and 205/131 HR 69 Left arm. Do you want patient to get am BP meds early or any new orders?     2719: Verbal ok to give am BP meds early and recheck BP in 1 hour via secure chat.

## 2023-10-10 NOTE — CARE PLAN
Problem: Balance  Goal: STG - Maintains dynamic standing balance with CGA upper extremity support  10/10/2023 1128 by Katt Baer, PT  Outcome: Progressing  10/10/2023 1128 by Katt Baer, PT  Reactivated     Problem: Mobility  Goal: STG - Patient will ambulate >50 ft with CGA and no assistive device   10/10/2023 1128 by Katt Baer, PT  Outcome: Progressing  10/10/2023 1128 by Katt Baer, PT  Reactivated     Problem: Transfers  Goal: STG - Patient will perform bed mobility with the use of bedrails and Jami x1  10/10/2023 1128 by Katt Baer, PT  Outcome: Progressing  10/10/2023 1128 by Katt Baer, PT  Reactivated  Goal: STG - Patient will transfer sit to and from stand with CGA with no assistive device  10/10/2023 1128 by Katt Baer, PT  Outcome: Progressing  10/10/2023 1128 by Katt Baer, PT  Reactivated

## 2023-10-10 NOTE — CARE PLAN
The patient's goals for the shift include Patient will remain safe and free from falls or injury    The clinical goals for the shift include Patients BP will remain stable      Problem: Fall/Injury  Goal: Not fall by end of shift  Outcome: Met  Goal: Be free from injury by end of the shift  Outcome: Met  Goal: Verbalize understanding of personal risk factors for fall in the hospital  Outcome: Met  Goal: Verbalize understanding of risk factor reduction measures to prevent injury from fall in the home  Outcome: Met  Goal: Use assistive devices by end of the shift  Outcome: Met  Goal: Pace activities to prevent fatigue by end of the shift  Outcome: Met     Problem: Diabetes  Goal: Achieve decreasing blood glucose levels by end of shift  Outcome: Met  Goal: Increase stability of blood glucose readings by end of shift  Outcome: Met  Goal: Decrease in ketones present in urine by end of shift  Outcome: Met  Goal: Maintain electrolyte levels within acceptable range throughout shift  Outcome: Met  Goal: Maintain glucose levels >70mg/dl to <250mg/dl throughout shift  Outcome: Met  Goal: No changes in neurological exam by end of shift  Outcome: Met  Goal: Learn about and adhere to nutrition recommendations by end of shift  Outcome: Met  Goal: Vital signs within normal range for age by end of shift  Outcome: Met  Goal: Increase self care and/or family involovement by end of shift  Outcome: Met  Goal: Receive DSME education by end of shift  Outcome: Met     Problem: Transfers  Goal: STG - Patient will perform bed mobility with the use of bedrails and Jami x1  Outcome: Met  Goal: STG - Patient will transfer sit to and from stand with CGA with no assistive device  Outcome: Met     Problem: ADLs  Goal: Patient will perform UB and LB bathing with supervision level of assistance and extended tub bench and long-handled sponge.  Outcome: Met  Goal: Patient with complete upper body dressing with modified independent level of assistance  donning and doffing pullover shirt with no adaptive equipment while edge of bed   Outcome: Met  Goal: Patient with complete lower body dressing with modified independent level of assistance donning and doffing all LE clothes  with reacher, sock-aid, dressing stick , and elastic shoe laces while edge of bed   Outcome: Met  Goal: Patient will complete daily grooming tasks brushing teeth and shaving with mod I level of assistance and PRN adaptive equipment while edge of bed .  Outcome: Met  Goal: Patient will complete toileting including hygiene clothing management/hygiene with SBA level of assistance and raised toilet seat.  Outcome: Met

## 2023-10-11 LAB
GLUCOSE BLD MANUAL STRIP-MCNC: 111 MG/DL (ref 74–99)
GLUCOSE BLD MANUAL STRIP-MCNC: 160 MG/DL (ref 74–99)
GLUCOSE BLD MANUAL STRIP-MCNC: 228 MG/DL (ref 74–99)
GLUCOSE BLD MANUAL STRIP-MCNC: 87 MG/DL (ref 74–99)

## 2023-10-11 PROCEDURE — 82947 ASSAY GLUCOSE BLOOD QUANT: CPT

## 2023-10-11 PROCEDURE — 96372 THER/PROPH/DIAG INJ SC/IM: CPT | Performed by: INTERNAL MEDICINE

## 2023-10-11 PROCEDURE — 2500000001 HC RX 250 WO HCPCS SELF ADMINISTERED DRUGS (ALT 637 FOR MEDICARE OP)

## 2023-10-11 PROCEDURE — 2500000002 HC RX 250 W HCPCS SELF ADMINISTERED DRUGS (ALT 637 FOR MEDICARE OP, ALT 636 FOR OP/ED): Performed by: INTERNAL MEDICINE

## 2023-10-11 PROCEDURE — 99233 SBSQ HOSP IP/OBS HIGH 50: CPT | Performed by: STUDENT IN AN ORGANIZED HEALTH CARE EDUCATION/TRAINING PROGRAM

## 2023-10-11 PROCEDURE — 2500000001 HC RX 250 WO HCPCS SELF ADMINISTERED DRUGS (ALT 637 FOR MEDICARE OP): Performed by: STUDENT IN AN ORGANIZED HEALTH CARE EDUCATION/TRAINING PROGRAM

## 2023-10-11 PROCEDURE — 2500000001 HC RX 250 WO HCPCS SELF ADMINISTERED DRUGS (ALT 637 FOR MEDICARE OP): Performed by: INTERNAL MEDICINE

## 2023-10-11 PROCEDURE — 1100000001 HC PRIVATE ROOM DAILY

## 2023-10-11 PROCEDURE — 2500000004 HC RX 250 GENERAL PHARMACY W/ HCPCS (ALT 636 FOR OP/ED): Performed by: INTERNAL MEDICINE

## 2023-10-11 RX ADMIN — ENOXAPARIN SODIUM 40 MG: 40 INJECTION SUBCUTANEOUS at 09:18

## 2023-10-11 RX ADMIN — LOSARTAN POTASSIUM 100 MG: 100 TABLET, FILM COATED ORAL at 09:20

## 2023-10-11 RX ADMIN — Medication 1 TABLET: at 09:19

## 2023-10-11 RX ADMIN — HYDRALAZINE HYDROCHLORIDE 100 MG: 50 TABLET, FILM COATED ORAL at 09:17

## 2023-10-11 RX ADMIN — DAPAGLIFLOZIN 10 MG: 10 TABLET, FILM COATED ORAL at 09:17

## 2023-10-11 RX ADMIN — Medication 25 MCG: at 09:00

## 2023-10-11 RX ADMIN — CARVEDILOL 12.5 MG: 12.5 TABLET, FILM COATED ORAL at 05:40

## 2023-10-11 RX ADMIN — HYDRALAZINE HYDROCHLORIDE 100 MG: 50 TABLET, FILM COATED ORAL at 20:52

## 2023-10-11 RX ADMIN — ASPIRIN 81 MG: 81 TABLET, COATED ORAL at 09:19

## 2023-10-11 RX ADMIN — CARVEDILOL 12.5 MG: 12.5 TABLET, FILM COATED ORAL at 18:48

## 2023-10-11 RX ADMIN — METFORMIN HYDROCHLORIDE 1000 MG: 1000 TABLET, FILM COATED ORAL at 09:18

## 2023-10-11 RX ADMIN — OXYCODONE HYDROCHLORIDE AND ACETAMINOPHEN 500 MG: 500 TABLET ORAL at 09:20

## 2023-10-11 RX ADMIN — INSULIN LISPRO 2 UNITS: 100 INJECTION, SOLUTION INTRAVENOUS; SUBCUTANEOUS at 16:43

## 2023-10-11 RX ADMIN — CARVEDILOL 12.5 MG: 12.5 TABLET, FILM COATED ORAL at 18:00

## 2023-10-11 RX ADMIN — METFORMIN HYDROCHLORIDE 1000 MG: 1000 TABLET, FILM COATED ORAL at 20:52

## 2023-10-11 RX ADMIN — ATORVASTATIN CALCIUM 40 MG: 40 TABLET, FILM COATED ORAL at 09:19

## 2023-10-11 RX ADMIN — SITAGLIPTIN 50 MG: 50 TABLET, FILM COATED ORAL at 09:20

## 2023-10-11 RX ADMIN — HYDRALAZINE HYDROCHLORIDE 100 MG: 50 TABLET, FILM COATED ORAL at 16:37

## 2023-10-11 RX ADMIN — INSULIN GLARGINE 20 UNITS: 100 INJECTION, SOLUTION SUBCUTANEOUS at 20:52

## 2023-10-11 ASSESSMENT — COGNITIVE AND FUNCTIONAL STATUS - GENERAL
WALKING IN HOSPITAL ROOM: A LOT
EATING MEALS: A LITTLE
MOBILITY SCORE: 16
PERSONAL GROOMING: A LITTLE
DRESSING REGULAR LOWER BODY CLOTHING: A LOT
TOILETING: TOTAL
TOILETING: TOTAL
TURNING FROM BACK TO SIDE WHILE IN FLAT BAD: A LITTLE
STANDING UP FROM CHAIR USING ARMS: A LITTLE
DRESSING REGULAR LOWER BODY CLOTHING: A LOT
EATING MEALS: A LITTLE
CLIMB 3 TO 5 STEPS WITH RAILING: A LOT
MOVING TO AND FROM BED TO CHAIR: A LITTLE
HELP NEEDED FOR BATHING: A LOT
DAILY ACTIVITIY SCORE: 14
WALKING IN HOSPITAL ROOM: A LOT
STANDING UP FROM CHAIR USING ARMS: A LITTLE
MOVING FROM LYING ON BACK TO SITTING ON SIDE OF FLAT BED WITH BEDRAILS: A LITTLE
HELP NEEDED FOR BATHING: A LOT
DAILY ACTIVITIY SCORE: 14
DRESSING REGULAR UPPER BODY CLOTHING: A LITTLE
PERSONAL GROOMING: A LITTLE
DRESSING REGULAR UPPER BODY CLOTHING: A LITTLE
MOVING FROM LYING ON BACK TO SITTING ON SIDE OF FLAT BED WITH BEDRAILS: A LITTLE
MOBILITY SCORE: 16
TURNING FROM BACK TO SIDE WHILE IN FLAT BAD: A LITTLE
CLIMB 3 TO 5 STEPS WITH RAILING: A LOT
MOVING TO AND FROM BED TO CHAIR: A LITTLE

## 2023-10-11 ASSESSMENT — PAIN - FUNCTIONAL ASSESSMENT: PAIN_FUNCTIONAL_ASSESSMENT: 0-10

## 2023-10-11 ASSESSMENT — PAIN SCALES - GENERAL
PAINLEVEL_OUTOF10: 0 - NO PAIN
PAINLEVEL_OUTOF10: 0 - NO PAIN

## 2023-10-11 NOTE — PROGRESS NOTES
10/11/23 1900   Discharge Planning   Home or Post Acute Services Post acute facilities (Rehab/SNF/etc)   Type of Post Acute Facility Services Skilled nursing  ([facility])   Patient expects to be discharged to: [facility]   Does the patient need discharge transport arranged? Yes   RoundTrip coordination needed? Yes   Has discharge transport been arranged? No     This TCC spoke with daughter Destiny to discuss insurance denial of acute rehab. Daughter agreeable to patient discharging to skilled nursing facility. Discussed with daughter accepting facilities. [facility]OC per daughter is [facility]. Facility updated and direct submit team asked to start precert. MD updated. Will continue to monitor for discharge planning needs. Carlee Johns RN, TCC

## 2023-10-11 NOTE — PROGRESS NOTES
..                                                                                                                                                                                                                            INTERNAL MEDICINE PROGRESS NOTE     BRIEF NARRATIVE      Miguel Angel Santos is a 73 y.o. male on day 9 of admission presenting with Hypertensive emergency.  PMHx of HTN, DMII per self-report, who presents for a syncopal event. Hx was difficult to obtain however from ED report it was mentioned that he was found down by his daughter and he was covered in urine, he doesn't recall any traumatic event and described it as syncope.   PT/OT saw the patient and recommended acute rehab, but rehab facilities are denying at this time.  Request has been changed to skilled nursing facility.  Both patient and family in agreement.  Family has significant concerns about this mentation change which they feel is more short-term.  Both neurology and neurosurgery has seen the patient, no urgent recommendations for MRI the brain from either team.   MRI brain on 10/10/23 showed no evidence of mass, cerebral infarction or hemorrhage. It did confirm *Disproportionate ventricular dilatation with possible interstitial  Edema and Hemosiderin deposition in a nonspecific pattern as described above.   SUBJECTIVE     Pt seen and examined today, sitting in chair this morning in NAD. Patient still mildly hypertensive. No other complaints     OBJECTIVE      Visit Vitals  BP (!) 182/91   Pulse 61   Temp 36.5 °C (97.7 °F)   Resp 18      No intake or output data in the 24 hours ending 10/11/23 0950   Physical Exam   General: Lying in bed without distress.  Cooperative.  Skin: No rashes ulcerations.  HEENT: Sclera is white.  Mucous membranes moist.  Neck: Supple.  No JVD.  Cardiac: Regular rate and rhythm, S1/S2 distant.  Lungs: Clear to auscultation bilaterally, no wheezing or crackles, no accessory muscle use at rest.  Abdomen: Soft,  "nontender, nondistended, BS +  Extremities: No cyanosis.  No lower extremity edema.  Neurologic: Alert and oriented to self, to place, to time (but slow to answer month), oriented to situation today.  No focal deficits.  Appears to have some fluctuating ability to answer the orientation questions.  Psychiatric: Appropriate mood and behavior.  Currently no agitation.    Current Meds   ascorbic acid, 500 mg, oral, Daily  aspirin, 81 mg, oral, Daily  atorvastatin, 40 mg, oral, Daily  carvedilol, 12.5 mg, oral, q12h  cholecalciferol, 25 mcg, oral, Daily  dapagliflozin propanediol, 10 mg, oral, Daily  enoxaparin, 40 mg, subcutaneous, Daily  hydrALAZINE, 100 mg, oral, TID  insulin glargine, 20 Units, subcutaneous, Nightly  insulin lispro, 0-10 Units, subcutaneous, TID with meals  losartan, 100 mg, oral, Daily  metFORMIN, 1,000 mg, oral, BID  multivitamin with minerals, 1 tablet, oral, Daily  perflutren protein A microsphere, 0.5 mL, intravenous, Once in imaging  SITagliptin phosphate, 50 mg, oral, Daily  sulfur hexafluoride microsphr, 2 mL, intravenous, Once in imaging       PRN medications: dextrose 10 % in water (D10W), dextrose, glucagon, hydrALAZINE     LABS and IMAGING     No results found for: \"WBC\", \"HGB\", \"HCT\", \"GLU\", \"CO2\", \"CREATININE\", \"CALCIUM\", \"INR\", \"PTT\", \"TROPONINI\"    Imaging reviewed      ASSESSMENT / PLANS      Syncope  NPH with ataxia and Urinary retention   -MRI brain on 10/10/23 resulted as above   -Orthostatic vitals done, patient appears to have a significant drop in blood pressure from lying to sitting, but then recovered upon standing.  Did not have any symptoms.  -Transthoracic echocardiogram done showing EF of 55 to 60% but suboptimal image quality.  No mention of significant valvular issues.  -PT/OT recommending acute rehab   _SW/TCC working on placement      Hypertensive emergency  -Blood pressure significantly elevated on admission.  Patient reports that he has been taking his medication, " but at this time unknown if he was truly taking his medications accurately.  Family reports that they are not sure if he truly has been taking it correctly.  -Currently on Coreg 12.5 mg twice a day, dapagliflozin 10 mg daily, losartan 100 mg daily, hydralazine 75 mg 3 times a day.  -BP better controlled   -Continue IV hydralazine as needed for elevated blood pressure.  -device interrogation of PPM that was placed in 2018 (Biotronik [id number] dual-chamber Edora 8DR-7 with serial #[id number] placed on 9/21/2018 at [facility]) done yesterday per device nurse.  Report reviewed and no significant arrhythmias.     Type II demand ischemia  -Elevated high-sensitivity troponins on admission but this is likely secondary to hypertensive emergency.  Troponins down trended since admission.     Acute kidney injury  -Creatinine previously 0.99, had increased to 1.49, but has since improved.  Last creatinine 1.19.  -BMP today is pending.     Diabetes mellitus type 2  -Patient reports no medications at home.  Reportedly on diet control only, but previously patient appears to have been prescribed medications.  Hemoglobin A1c 9.9.  -Glucose control improved from admission.  -Continue Lantus to 20 units at bedtime, continue mild sliding scale insulin.    -Continue Jardiance 10 mg daily, metformin 1000 mg twice a day, Januvia 50 mg daily.     Altered mental status, delirium versus neurocognitive disorder  -PT/OT has recommended acute rehab initially but patient has been denied, now recommended for skilled nursing facility, patient is in agreement and family is in agreement.  Patient's family is anxious about patient being at home by himself and believes that patient is not able to care for himself well.  Too early to tell at this time, we will see how patient does with further therapy.  -Cognitively patient answering orientation questions better but at the same time there is fluctuation on how well he answers and seems to have intermittent recall  issues.  -Patient's daughter is reporting poor memory recall with progression of mentation deterioration over the last 2 weeks.  Reportedly patient also has been having shuffling of his feet and reports of tremors of his hands.  Neurology has seen the patient and recommends outpatient follow-up with neurocognitive specialist.  -MRI 10/10/23 reviewed      Generalized weakness/physical deconditioning  -Currently waiting on accepting facility.     DVT prophylaxis  -Subcu Lovenox 40 mg daily.     Disposition: Patient insurance denied acute rehab, I attempted peer to peer twice without success, will try again this afternoon. Discussed with daughter Destiny garcia       Ree Reynolds MD

## 2023-10-11 NOTE — CARE PLAN
The patient's goals for the shift include Patient will remain safe and free from falls or injury    The clinical goals for the shift include Pt will be able to maintain BP wnl      Problem: Fall/Injury  Goal: Not fall by end of shift  Outcome: Progressing  Goal: Be free from injury by end of the shift  Outcome: Progressing  Goal: Verbalize understanding of personal risk factors for fall in the hospital  Outcome: Progressing  Goal: Verbalize understanding of risk factor reduction measures to prevent injury from fall in the home  Outcome: Progressing  Goal: Use assistive devices by end of the shift  Outcome: Progressing  Goal: Pace activities to prevent fatigue by end of the shift  Outcome: Progressing

## 2023-10-11 NOTE — CARE PLAN
Problem: Fall/Injury  Goal: Not fall by end of shift  Outcome: Progressing  Goal: Be free from injury by end of the shift  Outcome: Progressing  Goal: Verbalize understanding of personal risk factors for fall in the hospital  Outcome: Progressing  Goal: Verbalize understanding of risk factor reduction measures to prevent injury from fall in the home  Outcome: Progressing  Goal: Use assistive devices by end of the shift  Outcome: Progressing  Goal: Pace activities to prevent fatigue by end of the shift  Outcome: Progressing   The patient's goals for the shift include Patient will remain safe and free from falls or injury    The clinical goals for the shift include Patients BP will remain stable

## 2023-10-12 LAB
ALBUMIN SERPL BCP-MCNC: 3.6 G/DL (ref 3.4–5)
ALP SERPL-CCNC: 55 U/L (ref 33–136)
ALT SERPL W P-5'-P-CCNC: 21 U/L (ref 10–52)
ANION GAP SERPL CALC-SCNC: 17 MMOL/L (ref 10–20)
AST SERPL W P-5'-P-CCNC: 24 U/L (ref 9–39)
BILIRUB SERPL-MCNC: 0.7 MG/DL (ref 0–1.2)
BUN SERPL-MCNC: 34 MG/DL (ref 6–23)
CALCIUM SERPL-MCNC: 9.6 MG/DL (ref 8.6–10.6)
CHLORIDE SERPL-SCNC: 101 MMOL/L (ref 98–107)
CO2 SERPL-SCNC: 25 MMOL/L (ref 21–32)
CREAT SERPL-MCNC: 1.24 MG/DL (ref 0.5–1.3)
ERYTHROCYTE [DISTWIDTH] IN BLOOD BY AUTOMATED COUNT: 12.6 % (ref 11.5–14.5)
GFR SERPL CREATININE-BSD FRML MDRD: 61 ML/MIN/1.73M*2
GLUCOSE BLD MANUAL STRIP-MCNC: 134 MG/DL (ref 74–99)
GLUCOSE BLD MANUAL STRIP-MCNC: 168 MG/DL (ref 74–99)
GLUCOSE BLD MANUAL STRIP-MCNC: 207 MG/DL (ref 74–99)
GLUCOSE BLD MANUAL STRIP-MCNC: 209 MG/DL (ref 74–99)
GLUCOSE SERPL-MCNC: 169 MG/DL (ref 74–99)
HCT VFR BLD AUTO: 49.1 % (ref 41–52)
HGB BLD-MCNC: 15.5 G/DL (ref 13.5–17.5)
MCH RBC QN AUTO: 32.4 PG (ref 26–34)
MCHC RBC AUTO-ENTMCNC: 31.6 G/DL (ref 32–36)
MCV RBC AUTO: 103 FL (ref 80–100)
NRBC BLD-RTO: 0 /100 WBCS (ref 0–0)
PLATELET # BLD AUTO: 207 X10*3/UL (ref 150–450)
PMV BLD AUTO: 11.4 FL (ref 7.5–11.5)
POTASSIUM SERPL-SCNC: 4.2 MMOL/L (ref 3.5–5.3)
PROT SERPL-MCNC: 6.2 G/DL (ref 6.4–8.2)
RBC # BLD AUTO: 4.79 X10*6/UL (ref 4.5–5.9)
SODIUM SERPL-SCNC: 139 MMOL/L (ref 136–145)
WBC # BLD AUTO: 9.4 X10*3/UL (ref 4.4–11.3)

## 2023-10-12 PROCEDURE — 97116 GAIT TRAINING THERAPY: CPT | Mod: GP,CQ

## 2023-10-12 PROCEDURE — 36415 COLL VENOUS BLD VENIPUNCTURE: CPT | Performed by: STUDENT IN AN ORGANIZED HEALTH CARE EDUCATION/TRAINING PROGRAM

## 2023-10-12 PROCEDURE — 2500000001 HC RX 250 WO HCPCS SELF ADMINISTERED DRUGS (ALT 637 FOR MEDICARE OP): Performed by: STUDENT IN AN ORGANIZED HEALTH CARE EDUCATION/TRAINING PROGRAM

## 2023-10-12 PROCEDURE — 1100000001 HC PRIVATE ROOM DAILY

## 2023-10-12 PROCEDURE — 82947 ASSAY GLUCOSE BLOOD QUANT: CPT | Mod: CMCLAB

## 2023-10-12 PROCEDURE — 96372 THER/PROPH/DIAG INJ SC/IM: CPT | Performed by: INTERNAL MEDICINE

## 2023-10-12 PROCEDURE — 85027 COMPLETE CBC AUTOMATED: CPT | Performed by: STUDENT IN AN ORGANIZED HEALTH CARE EDUCATION/TRAINING PROGRAM

## 2023-10-12 PROCEDURE — 2500000004 HC RX 250 GENERAL PHARMACY W/ HCPCS (ALT 636 FOR OP/ED): Performed by: STUDENT IN AN ORGANIZED HEALTH CARE EDUCATION/TRAINING PROGRAM

## 2023-10-12 PROCEDURE — 2500000002 HC RX 250 W HCPCS SELF ADMINISTERED DRUGS (ALT 637 FOR MEDICARE OP, ALT 636 FOR OP/ED): Performed by: INTERNAL MEDICINE

## 2023-10-12 PROCEDURE — 2500000004 HC RX 250 GENERAL PHARMACY W/ HCPCS (ALT 636 FOR OP/ED): Performed by: INTERNAL MEDICINE

## 2023-10-12 PROCEDURE — 97110 THERAPEUTIC EXERCISES: CPT | Mod: GP,CQ

## 2023-10-12 PROCEDURE — 99232 SBSQ HOSP IP/OBS MODERATE 35: CPT | Performed by: STUDENT IN AN ORGANIZED HEALTH CARE EDUCATION/TRAINING PROGRAM

## 2023-10-12 PROCEDURE — 2500000001 HC RX 250 WO HCPCS SELF ADMINISTERED DRUGS (ALT 637 FOR MEDICARE OP)

## 2023-10-12 PROCEDURE — 80053 COMPREHEN METABOLIC PANEL: CPT | Performed by: STUDENT IN AN ORGANIZED HEALTH CARE EDUCATION/TRAINING PROGRAM

## 2023-10-12 PROCEDURE — 2500000001 HC RX 250 WO HCPCS SELF ADMINISTERED DRUGS (ALT 637 FOR MEDICARE OP): Performed by: INTERNAL MEDICINE

## 2023-10-12 RX ORDER — DOXAZOSIN 4 MG/1
4 TABLET ORAL DAILY
Status: DISCONTINUED | OUTPATIENT
Start: 2023-10-12 | End: 2023-10-13 | Stop reason: HOSPADM

## 2023-10-12 RX ADMIN — ATORVASTATIN CALCIUM 40 MG: 40 TABLET, FILM COATED ORAL at 08:57

## 2023-10-12 RX ADMIN — CARVEDILOL 12.5 MG: 12.5 TABLET, FILM COATED ORAL at 05:58

## 2023-10-12 RX ADMIN — OXYCODONE HYDROCHLORIDE AND ACETAMINOPHEN 500 MG: 500 TABLET ORAL at 08:57

## 2023-10-12 RX ADMIN — Medication 25 MCG: at 08:57

## 2023-10-12 RX ADMIN — HYDRALAZINE HYDROCHLORIDE 100 MG: 50 TABLET, FILM COATED ORAL at 14:44

## 2023-10-12 RX ADMIN — CARVEDILOL 12.5 MG: 12.5 TABLET, FILM COATED ORAL at 17:40

## 2023-10-12 RX ADMIN — METFORMIN HYDROCHLORIDE 1000 MG: 1000 TABLET, FILM COATED ORAL at 08:57

## 2023-10-12 RX ADMIN — HYDRALAZINE HYDROCHLORIDE 100 MG: 50 TABLET, FILM COATED ORAL at 20:11

## 2023-10-12 RX ADMIN — INSULIN LISPRO 2 UNITS: 100 INJECTION, SOLUTION INTRAVENOUS; SUBCUTANEOUS at 17:40

## 2023-10-12 RX ADMIN — DAPAGLIFLOZIN 10 MG: 10 TABLET, FILM COATED ORAL at 08:57

## 2023-10-12 RX ADMIN — LOSARTAN POTASSIUM 100 MG: 100 TABLET, FILM COATED ORAL at 08:57

## 2023-10-12 RX ADMIN — ENOXAPARIN SODIUM 40 MG: 40 INJECTION SUBCUTANEOUS at 08:57

## 2023-10-12 RX ADMIN — INSULIN GLARGINE 20 UNITS: 100 INJECTION, SOLUTION SUBCUTANEOUS at 20:11

## 2023-10-12 RX ADMIN — INSULIN LISPRO 4 UNITS: 100 INJECTION, SOLUTION INTRAVENOUS; SUBCUTANEOUS at 14:37

## 2023-10-12 RX ADMIN — Medication 1 TABLET: at 08:57

## 2023-10-12 RX ADMIN — HYDRALAZINE HYDROCHLORIDE 10 MG: 20 INJECTION INTRAMUSCULAR; INTRAVENOUS at 09:00

## 2023-10-12 RX ADMIN — ASPIRIN 81 MG: 81 TABLET, COATED ORAL at 08:57

## 2023-10-12 RX ADMIN — METFORMIN HYDROCHLORIDE 1000 MG: 1000 TABLET, FILM COATED ORAL at 20:11

## 2023-10-12 RX ADMIN — SITAGLIPTIN 50 MG: 50 TABLET, FILM COATED ORAL at 08:57

## 2023-10-12 RX ADMIN — DOXAZOSIN 4 MG: 4 TABLET ORAL at 14:44

## 2023-10-12 ASSESSMENT — COGNITIVE AND FUNCTIONAL STATUS - GENERAL
STANDING UP FROM CHAIR USING ARMS: A LOT
MOVING TO AND FROM BED TO CHAIR: A LITTLE
MOVING FROM LYING ON BACK TO SITTING ON SIDE OF FLAT BED WITH BEDRAILS: A LITTLE
WALKING IN HOSPITAL ROOM: A LITTLE
CLIMB 3 TO 5 STEPS WITH RAILING: A LOT
MOBILITY SCORE: 16
TURNING FROM BACK TO SIDE WHILE IN FLAT BAD: A LITTLE

## 2023-10-12 ASSESSMENT — PAIN SCALES - GENERAL
PAINLEVEL_OUTOF10: 0 - NO PAIN
PAINLEVEL_OUTOF10: 3
PAINLEVEL_OUTOF10: 0 - NO PAIN

## 2023-10-12 ASSESSMENT — PAIN - FUNCTIONAL ASSESSMENT
PAIN_FUNCTIONAL_ASSESSMENT: 0-10

## 2023-10-12 ASSESSMENT — PAIN DESCRIPTION - DESCRIPTORS: DESCRIPTORS: ACHING

## 2023-10-12 NOTE — PROGRESS NOTES
10/12/23 1300   Discharge Planning   Home or Post Acute Services Post acute facilities (Rehab/SNF/etc)   Type of Post Acute Facility Services Skilled nursing   Patient expects to be discharged to: [facility]   Does the patient need discharge transport arranged? Yes   RoundTrip coordination needed? Yes   Has discharge transport been arranged? Yes   What day is the transport expected? 10/13/23   What time is the transport expected? 1500     Auth obtained for [facility]. Facility has bed available 10/13 after 2pm. Transportation arranged for Friday 10/13 @ 3pm. Report #: [id number]. Patient, dtr MD Destiny, Melisa STARR updated. Blue slip delivered to unit secretary. 7000 completed in [place] and goldenrod sent to facility. Carlee Johns RN, TCC

## 2023-10-12 NOTE — PROGRESS NOTES
[facility] confirmed 1500 stretcher transport for 10/13. [place] complete and sent to [place]. 7000 complete. TCC confirmed imm will be completed and TCC to provide floor with transfer slip.    VASU Morris

## 2023-10-12 NOTE — CARE PLAN
Problem: Fall/Injury  Goal: Not fall by end of shift  Outcome: Progressing  Goal: Be free from injury by end of the shift  Outcome: Progressing  Goal: Verbalize understanding of personal risk factors for fall in the hospital  Outcome: Progressing  Goal: Verbalize understanding of risk factor reduction measures to prevent injury from fall in the home  Outcome: Progressing  Goal: Use assistive devices by end of the shift  Outcome: Progressing  Goal: Pace activities to prevent fatigue by end of the shift  Outcome: Progressing   The patient's goals for the shift include Patient will remain safe and free from falls or injury    The clinical goals for the shift include Pt will remain free from injury throughout shift.

## 2023-10-12 NOTE — PROGRESS NOTES
...                                                                                                                                                                                                                            INTERNAL MEDICINE PROGRESS NOTE     BRIEF NARRATIVE      Miguel Angel Santos is a 73 y.o. male on day 10 of admission presenting with Hypertensive emergency.  PMHx of HTN, DMII per self-report, who presents for a syncopal event. Hx was difficult to obtain however from ED report it was mentioned that he was found down by his daughter and he was covered in urine, he doesn't recall any traumatic event and described it as syncope.   PT/OT saw the patient and recommended acute rehab, but rehab facilities are denying at this time.  Request has been changed to skilled nursing facility.  Both patient and family in agreement.  Family has significant concerns about this mentation change which they feel is more short-term.  Both neurology and neurosurgery has seen the patient, no urgent recommendations for MRI the brain from either team.   MRI brain on 10/10/23 showed no evidence of mass, cerebral infarction or hemorrhage. It did confirm *Disproportionate ventricular dilatation with possible interstitial  Edema and Hemosiderin deposition in a nonspecific pattern as described above.   SUBJECTIVE     Pt seen and examined today, sitting in chair this morning in NAD. BP better controlled today. Denies fever or chill. No other complaints     OBJECTIVE      Visit Vitals  BP (!) 167/91   Pulse 63   Temp 36.7 °C (98.1 °F)   Resp 16        Intake/Output Summary (Last 24 hours) at 10/12/2023 0926  Last data filed at 10/12/2023 0557  Gross per 24 hour   Intake --   Output 650 ml   Net -650 ml      Physical Exam   General: Lying in bed without distress.  Cooperative.  Skin: No rashes ulcerations.  HEENT: Sclera is white.  Mucous membranes moist.  Neck: Supple.  No JVD.  Cardiac: Regular rate and rhythm, S1/S2  "distant.  Lungs: Clear to auscultation bilaterally, no wheezing or crackles, no accessory muscle use at rest.  Abdomen: Soft, nontender, nondistended, BS +  Extremities: No cyanosis.  No lower extremity edema.  Neurologic: Alert and oriented to self, to place, to time (but slow to answer month), oriented to situation today.  No focal deficits.  Appears to have some fluctuating ability to answer the orientation questions.  Psychiatric: Appropriate mood and behavior.  Currently no agitation.    Current Meds   ascorbic acid, 500 mg, oral, Daily  aspirin, 81 mg, oral, Daily  atorvastatin, 40 mg, oral, Daily  carvedilol, 12.5 mg, oral, q12h  cholecalciferol, 25 mcg, oral, Daily  dapagliflozin propanediol, 10 mg, oral, Daily  enoxaparin, 40 mg, subcutaneous, Daily  hydrALAZINE, 100 mg, oral, TID  insulin glargine, 20 Units, subcutaneous, Nightly  insulin lispro, 0-10 Units, subcutaneous, TID with meals  losartan, 100 mg, oral, Daily  metFORMIN, 1,000 mg, oral, BID  multivitamin with minerals, 1 tablet, oral, Daily  perflutren protein A microsphere, 0.5 mL, intravenous, Once in imaging  SITagliptin phosphate, 50 mg, oral, Daily  sulfur hexafluoride microsphr, 2 mL, intravenous, Once in imaging       PRN medications: dextrose 10 % in water (D10W), dextrose, glucagon, hydrALAZINE     LABS and IMAGING     No results found for: \"WBC\", \"HGB\", \"HCT\", \"GLU\", \"CO2\", \"CREATININE\", \"CALCIUM\", \"INR\", \"PTT\", \"TROPONINI\"    Imaging reviewed      ASSESSMENT / PLANS      Syncope  NPH with ataxia and Urinary retention   -MRI brain on 10/10/23 resulted as above   -Orthostatic vitals done, patient appears to have a significant drop in blood pressure from lying to sitting, but then recovered upon standing.  Did not have any symptoms.  -Transthoracic echocardiogram done showing EF of 55 to 60% but suboptimal image quality.  No mention of significant valvular issues.  -PT/OT recommending acute rehab   -SW/TCC working on placement    "   Hypertensive emergency  -Blood pressure significantly elevated on admission.  Patient reports that he has been taking his medication, but at this time unknown if he was truly taking his medications accurately.  Family reports that they are not sure if he truly has been taking it correctly.  -Currently on Coreg 12.5 mg twice a day, dapagliflozin 10 mg daily, losartan 100 mg daily, hydralazine 75 mg 3 times a day.  -BP better controlled   -Continue IV hydralazine as needed for elevated blood pressure.  -device interrogation of PPM that was placed in 2018 ([employer] [id number] dual-chamber [place] 8DR-7 with serial #[id number] placed on 9/21/2018 at [facility]) done yesterday per device nurse.  Report reviewed and no significant arrhythmias.     Type II demand ischemia  -Elevated high-sensitivity troponins on admission but this is likely secondary to hypertensive emergency.  Troponins down trended since admission.     Acute kidney injury  -Creatinine previously 0.99, had increased to 1.49, but has since improved.  Last creatinine 1.19.  -BMP today is pending.     Diabetes mellitus type 2  -Patient reports no medications at home.  Reportedly on diet control only, but previously patient appears to have been prescribed medications.  Hemoglobin A1c 9.9.  -Glucose control improved from admission.  -Continue Lantus to 20 units at bedtime, continue mild sliding scale insulin.    -Continue Jardiance 10 mg daily, metformin 1000 mg twice a day, Januvia 50 mg daily.     Altered mental status, delirium versus neurocognitive disorder  -PT/OT has recommended acute rehab initially but patient has been denied, now recommended for skilled nursing facility, patient is in agreement and family is in agreement.  Patient's family is anxious about patient being at home by himself and believes that patient is not able to care for himself well.  Too early to tell at this time, we will see how patient does with further therapy.  -Cognitively patient  answering orientation questions better but at the same time there is fluctuation on how well he answers and seems to have intermittent recall issues.  -Patient's daughter is reporting poor memory recall with progression of mentation deterioration over the last 2 weeks.  Reportedly patient also has been having shuffling of his feet and reports of tremors of his hands.  Neurology has seen the patient and recommends outpatient follow-up with neurocognitive specialist.  -MRI 10/10/23 reviewed      Generalized weakness/physical deconditioning  -Currently waiting on accepting facility.     DVT prophylaxis  -Subcu Lovenox 40 mg daily.     Disposition: Patient insurance denied acute rehab, I attempted peer to peer twice without success. Discussed with daughter Destiny, patient is now awaiting [facility] acceptance, Pre-cert initiated       Ree Reynolds MD

## 2023-10-12 NOTE — PROGRESS NOTES
Auth received. [facility] will have a bed available tomorrow after 1400. PCN requested 10/13 1500 stretcher transport. Pending Roundtrip confirmation. [facility] notified.    VASU Morris

## 2023-10-12 NOTE — PROGRESS NOTES
Physical Therapy    Physical Therapy Treatment    Patient Name: Miguel Angel Santos  MRN: [id number]  Today's Date: 10/12/2023  Time Calculation  Start Time: 0950  Stop Time: 1026  Time Calculation (min): 36 min     Assessment/Plan   PT Assessment  PT Assessment Results: Decreased strength  Rehab Prognosis: Good  Evaluation/Treatment Tolerance: Patient tolerated treatment well  Medical Staff Made Aware: Yes  Strengths: Attitude of self  End of Session Communication: Bedside nurse  Assessment Comment: Pt required more assist for sit->stand transfer, but was able to progress to further ambulation on this date. Pt remains appropriate for continued therapy upon discharge at a high intensity level to focus on improved functional mobility, balance and activity tolerance.  End of Session Patient Position: Up in chair  PT Plan  Inpatient/Swing Bed or Outpatient: Inpatient  PT Plan  Treatment/Interventions: Bed mobility, Transfer training, Gait training, Balance training, Neuromuscular re-education, Strengthening, Endurance training, Postural re-education, Therapeutic exercise, Therapeutic activity  PT Plan: Skilled PT  PT Frequency: 4 times per week  PT Discharge Recommendations: High intensity level of continued care  PT Recommended Transfer Status: Assist x2    General Visit Information:   PT  Visit  PT Received On: 10/12/23  Response to Previous Treatment: Patient with no complaints from previous session.  General  Reason for Referral: presents for a syncopal event  Past Medical History Relevant to Rehab: PMHx of HTN, DMII  Patient Position Received: Up in chair  Preferred Learning Style: verbal  General Comment: Pt seems to have issues with problem-solving and short term memory recall. Pt can seem distracted and speak tangentially at times.    Subjective   Precautions:  Precautions  Medical Precautions: Fall precautions    Objective   Pain:  Pain Assessment  Pain Assessment: 0-10  Pain Score: 3  Pain Type: Acute pain  Pain  Location: Back  Pain Descriptors: Aching  Cognition:  Cognition  Orientation Level: Oriented X4    Activity Tolerance:  Activity Tolerance  Endurance: Tolerates 10 - 20 min exercise with multiple rests  Treatments:  Therapeutic Exercise  Therapeutic Exercise Performed: Yes  Therapeutic Exercise Activity 1: Seated Bilateral LE: ankle pumps, LAQ, marching, Hip ABD/ADD, GS x10 each    Ambulation/Gait Training  Ambulation/Gait Training Performed: Yes  Ambulation/Gait Training 1  Surface 1: Level tile  Device 1: Rolling walker  Assistance 1: Minimum assistance  Quality of Gait 1: Inconsistent stride length  Comments/Distance (ft) 1: 50 feet (required verbal cuing to stay inside FWW at all times.)  Transfers  Transfer: Yes  Transfer 1  Transfer From 1: Sit to  Transfer to 1: Stand  Transfer Device 1: Walker  Transfer Level of Assistance 1: Moderate assistance  Trials/Comments 1:  (On 1st attempt Pt was unable to stand fully erect and then became retropulsive and apruptly sat back onto chair.)  Transfers 2  Transfer From 2: Sit to  Transfer to 2: Stand  Transfer Device 2: Walker  Transfer Level of Assistance 2: Moderate assistance    Outcome Measures:  [facility] Basic Mobility  Turning from your back to your side while in a flat bed without using bedrails: A little  Moving from lying on your back to sitting on the side of a flat bed without using bedrails: A little  Moving to and from bed to chair (including a wheelchair): A little  Standing up from a chair using your arms (e.g. wheelchair or bedside chair): A lot  To walk in hospital room: A little  Climbing 3-5 steps with railing: A lot  Basic Mobility - Total Score: 16    Education Documentation  Mobility Training, taught by Yadira Rider PTA at 10/12/2023  1:23 PM.  Learner: Patient  Readiness: Acceptance  Method: Explanation  Response: Verbalizes Understanding    Education Comments  No comments found.      OP EDUCATION:  Education  Individual(s) Educated:  Patient  Education Provided: Home Exercise Program (and FWW management)    Encounter Problems       Encounter Problems (Active)       Balance       STG - Maintains dynamic standing balance with CGA upper extremity support (Progressing)       Start:  10/03/23    Expected End:  10/17/23               Mobility       STG - Patient will ambulate >50 ft with CGA and no assistive device  (Progressing)       Start:  10/03/23    Expected End:  10/17/23               Transfers       STG - Patient will perform bed mobility with the use of bedrails and Jami x1 (Progressing)       Start:  10/03/23    Expected End:  10/17/23            STG - Patient will transfer sit to and from stand with CGA with no assistive device (Progressing)       Start:  10/03/23    Expected End:  10/17/23

## 2023-10-13 VITALS
HEIGHT: 76 IN | HEART RATE: 70 BPM | BODY MASS INDEX: 24.73 KG/M2 | RESPIRATION RATE: 19 BRPM | OXYGEN SATURATION: 98 % | SYSTOLIC BLOOD PRESSURE: 156 MMHG | TEMPERATURE: 98.6 F | DIASTOLIC BLOOD PRESSURE: 79 MMHG | WEIGHT: 203.04 LBS

## 2023-10-13 LAB
ALBUMIN SERPL BCP-MCNC: 3.8 G/DL (ref 3.4–5)
ALP SERPL-CCNC: 62 U/L (ref 33–136)
ALT SERPL W P-5'-P-CCNC: 23 U/L (ref 10–52)
ANION GAP SERPL CALC-SCNC: 16 MMOL/L (ref 10–20)
AST SERPL W P-5'-P-CCNC: 22 U/L (ref 9–39)
BILIRUB SERPL-MCNC: 0.6 MG/DL (ref 0–1.2)
BUN SERPL-MCNC: 35 MG/DL (ref 6–23)
CALCIUM SERPL-MCNC: 9.7 MG/DL (ref 8.6–10.6)
CHLORIDE SERPL-SCNC: 101 MMOL/L (ref 98–107)
CO2 SERPL-SCNC: 25 MMOL/L (ref 21–32)
CREAT SERPL-MCNC: 1.2 MG/DL (ref 0.5–1.3)
ERYTHROCYTE [DISTWIDTH] IN BLOOD BY AUTOMATED COUNT: 12.3 % (ref 11.5–14.5)
GFR SERPL CREATININE-BSD FRML MDRD: 64 ML/MIN/1.73M*2
GLUCOSE BLD MANUAL STRIP-MCNC: 134 MG/DL (ref 74–99)
GLUCOSE BLD MANUAL STRIP-MCNC: 172 MG/DL (ref 74–99)
GLUCOSE SERPL-MCNC: 158 MG/DL (ref 74–99)
HCT VFR BLD AUTO: 45.2 % (ref 41–52)
HGB BLD-MCNC: 15.5 G/DL (ref 13.5–17.5)
MCH RBC QN AUTO: 32.6 PG (ref 26–34)
MCHC RBC AUTO-ENTMCNC: 34.3 G/DL (ref 32–36)
MCV RBC AUTO: 95 FL (ref 80–100)
NRBC BLD-RTO: 0 /100 WBCS (ref 0–0)
PLATELET # BLD AUTO: 221 X10*3/UL (ref 150–450)
PMV BLD AUTO: 11.4 FL (ref 7.5–11.5)
POTASSIUM SERPL-SCNC: 3.7 MMOL/L (ref 3.5–5.3)
PROT SERPL-MCNC: 6.9 G/DL (ref 6.4–8.2)
RBC # BLD AUTO: 4.75 X10*6/UL (ref 4.5–5.9)
SODIUM SERPL-SCNC: 138 MMOL/L (ref 136–145)
WBC # BLD AUTO: 10.2 X10*3/UL (ref 4.4–11.3)

## 2023-10-13 PROCEDURE — 82947 ASSAY GLUCOSE BLOOD QUANT: CPT | Mod: CMCLAB

## 2023-10-13 PROCEDURE — 2500000001 HC RX 250 WO HCPCS SELF ADMINISTERED DRUGS (ALT 637 FOR MEDICARE OP): Performed by: INTERNAL MEDICINE

## 2023-10-13 PROCEDURE — 96372 THER/PROPH/DIAG INJ SC/IM: CPT | Performed by: INTERNAL MEDICINE

## 2023-10-13 PROCEDURE — 36415 COLL VENOUS BLD VENIPUNCTURE: CPT | Performed by: STUDENT IN AN ORGANIZED HEALTH CARE EDUCATION/TRAINING PROGRAM

## 2023-10-13 PROCEDURE — 80053 COMPREHEN METABOLIC PANEL: CPT | Performed by: STUDENT IN AN ORGANIZED HEALTH CARE EDUCATION/TRAINING PROGRAM

## 2023-10-13 PROCEDURE — 2500000001 HC RX 250 WO HCPCS SELF ADMINISTERED DRUGS (ALT 637 FOR MEDICARE OP): Performed by: STUDENT IN AN ORGANIZED HEALTH CARE EDUCATION/TRAINING PROGRAM

## 2023-10-13 PROCEDURE — 97116 GAIT TRAINING THERAPY: CPT | Mod: GP,CQ

## 2023-10-13 PROCEDURE — 2500000001 HC RX 250 WO HCPCS SELF ADMINISTERED DRUGS (ALT 637 FOR MEDICARE OP)

## 2023-10-13 PROCEDURE — 85027 COMPLETE CBC AUTOMATED: CPT | Mod: CMCLAB | Performed by: STUDENT IN AN ORGANIZED HEALTH CARE EDUCATION/TRAINING PROGRAM

## 2023-10-13 PROCEDURE — 36415 COLL VENOUS BLD VENIPUNCTURE: CPT | Mod: CMCLAB | Performed by: STUDENT IN AN ORGANIZED HEALTH CARE EDUCATION/TRAINING PROGRAM

## 2023-10-13 PROCEDURE — 2500000004 HC RX 250 GENERAL PHARMACY W/ HCPCS (ALT 636 FOR OP/ED): Performed by: INTERNAL MEDICINE

## 2023-10-13 PROCEDURE — 97530 THERAPEUTIC ACTIVITIES: CPT | Mod: GP,CQ

## 2023-10-13 PROCEDURE — 99239 HOSP IP/OBS DSCHRG MGMT >30: CPT | Performed by: STUDENT IN AN ORGANIZED HEALTH CARE EDUCATION/TRAINING PROGRAM

## 2023-10-13 RX ORDER — ASPIRIN 81 MG/1
81 TABLET ORAL DAILY
Qty: 30 TABLET | Refills: 1 | Status: SHIPPED | OUTPATIENT
Start: 2023-10-13 | End: 2023-12-12

## 2023-10-13 RX ORDER — LOSARTAN POTASSIUM 100 MG/1
100 TABLET ORAL DAILY
Qty: 30 TABLET | Refills: 1 | Status: SHIPPED | OUTPATIENT
Start: 2023-10-13 | End: 2023-12-12

## 2023-10-13 RX ORDER — DAPAGLIFLOZIN 10 MG/1
10 TABLET, FILM COATED ORAL DAILY
Qty: 30 TABLET | Refills: 1 | Status: SHIPPED | OUTPATIENT
Start: 2023-10-13 | End: 2023-12-12

## 2023-10-13 RX ORDER — HYDRALAZINE HYDROCHLORIDE 100 MG/1
100 TABLET, FILM COATED ORAL 3 TIMES DAILY
Qty: 90 TABLET | Refills: 1 | Status: SHIPPED | OUTPATIENT
Start: 2023-10-13 | End: 2023-12-12

## 2023-10-13 RX ORDER — DOXAZOSIN 4 MG/1
4 TABLET ORAL DAILY
Qty: 30 TABLET | Refills: 1 | Status: SHIPPED | OUTPATIENT
Start: 2023-10-13 | End: 2023-12-12

## 2023-10-13 RX ADMIN — CARVEDILOL 12.5 MG: 12.5 TABLET, FILM COATED ORAL at 06:01

## 2023-10-13 RX ADMIN — HYDRALAZINE HYDROCHLORIDE 100 MG: 50 TABLET, FILM COATED ORAL at 09:41

## 2023-10-13 RX ADMIN — ENOXAPARIN SODIUM 40 MG: 40 INJECTION SUBCUTANEOUS at 09:40

## 2023-10-13 RX ADMIN — SITAGLIPTIN 50 MG: 50 TABLET, FILM COATED ORAL at 09:41

## 2023-10-13 RX ADMIN — DOXAZOSIN 4 MG: 4 TABLET ORAL at 09:41

## 2023-10-13 RX ADMIN — LOSARTAN POTASSIUM 100 MG: 100 TABLET, FILM COATED ORAL at 09:40

## 2023-10-13 RX ADMIN — INSULIN LISPRO 2 UNITS: 100 INJECTION, SOLUTION INTRAVENOUS; SUBCUTANEOUS at 12:14

## 2023-10-13 RX ADMIN — ASPIRIN 81 MG: 81 TABLET, COATED ORAL at 09:41

## 2023-10-13 RX ADMIN — DAPAGLIFLOZIN 10 MG: 10 TABLET, FILM COATED ORAL at 09:41

## 2023-10-13 RX ADMIN — ATORVASTATIN CALCIUM 40 MG: 40 TABLET, FILM COATED ORAL at 09:41

## 2023-10-13 RX ADMIN — Medication 1 TABLET: at 09:40

## 2023-10-13 RX ADMIN — Medication 25 MCG: at 09:41

## 2023-10-13 RX ADMIN — METFORMIN HYDROCHLORIDE 1000 MG: 1000 TABLET, FILM COATED ORAL at 09:41

## 2023-10-13 RX ADMIN — OXYCODONE HYDROCHLORIDE AND ACETAMINOPHEN 500 MG: 500 TABLET ORAL at 09:41

## 2023-10-13 ASSESSMENT — PAIN - FUNCTIONAL ASSESSMENT: PAIN_FUNCTIONAL_ASSESSMENT: 0-10

## 2023-10-13 ASSESSMENT — COGNITIVE AND FUNCTIONAL STATUS - GENERAL
TURNING FROM BACK TO SIDE WHILE IN FLAT BAD: A LITTLE
STANDING UP FROM CHAIR USING ARMS: A LITTLE
MOVING FROM LYING ON BACK TO SITTING ON SIDE OF FLAT BED WITH BEDRAILS: A LITTLE
WALKING IN HOSPITAL ROOM: A LITTLE
CLIMB 3 TO 5 STEPS WITH RAILING: A LOT
MOBILITY SCORE: 17
MOVING TO AND FROM BED TO CHAIR: A LITTLE

## 2023-10-13 ASSESSMENT — PAIN SCALES - GENERAL: PAINLEVEL_OUTOF10: 0 - NO PAIN

## 2023-10-13 NOTE — CARE PLAN
Problem: Fall/Injury  Goal: Not fall by end of shift  Outcome: Progressing  Goal: Be free from injury by end of the shift  Outcome: Progressing  Goal: Verbalize understanding of personal risk factors for fall in the hospital  Outcome: Progressing  Goal: Verbalize understanding of risk factor reduction measures to prevent injury from fall in the home  Outcome: Progressing  Goal: Use assistive devices by end of the shift  Outcome: Progressing  Goal: Pace activities to prevent fatigue by end of the shift  Outcome: Progressing   The patient's goals for the shift include Patient will remain safe and free from falls or injury    The clinical goals for the shift include Pt remain safe b/p wnl

## 2023-10-13 NOTE — PROGRESS NOTES
Physical Therapy    Physical Therapy Treatment    Patient Name: Miguel Angel Santos  MRN: [id number]  Today's Date: 10/13/2023  Time Calculation  Start Time: 1013  Stop Time: 1106  Time Calculation (min): 53 min       Assessment/Plan   PT Assessment  PT Assessment Results: Decreased strength, Impaired balance, Decreased mobility  Rehab Prognosis: Good  Evaluation/Treatment Tolerance: Patient tolerated treatment well  Medical Staff Made Aware: Yes  Strengths: Attitude of self  End of Session Communication: Bedside nurse  Assessment Comment: Pt motivated throughout treatment.  End of Session Patient Position: Up in chair, Alarm on  PT Plan  Inpatient/Swing Bed or Outpatient: Inpatient  PT Plan  Treatment/Interventions: Bed mobility, Transfer training, Gait training, Balance training, Neuromuscular re-education, Strengthening, Endurance training, Postural re-education, Therapeutic exercise, Therapeutic activity  PT Plan: Skilled PT  PT Frequency: 4 times per week  PT Discharge Recommendations: High intensity level of continued care  PT Recommended Transfer Status: Assist x2      General Visit Information:   PT  Visit  PT Received On: 10/13/23  General  Missed Visit Reason: Patient in a medical procedure (Pt's /86, RN notified and asked therapy to hold treatment)  Family/Caregiver Present: No  Caregiver Feedback: Daughter present, supportive and involved in pt's care  Prior to Session Communication: Bedside nurse  Patient Position Received: Bed, 3 rail up, Alarm off, not on at start of session  General Comment: Pt pleasant and agreeable to therapy. Pt is easily distracted and requires frequent cues to redirect to the task.    Subjective   Precautions:  Precautions  Medical Precautions: Fall precautions  Vital Signs:  Vital Signs  Heart Rate: 70  Heart Rate Source: Monitor  SpO2: 98 %  BP: 156/79  MAP (mmHg): 105  BP Location: Left arm  BP Method: Automatic  Patient Position: Sitting    Objective   Pain:  Pain  Assessment  Pain Assessment: 0-10  Pain Score: 0 - No pain  Cognition:  Cognition  Overall Cognitive Status: Impaired  Arousal/Alertness: Appropriate responses to stimuli  Orientation Level: Disoriented to place, Disoriented to situation  Following Commands: Follows multistep commands with repetition  Attention: Exceptions to WFL  Alternating Attention: Impaired  Other (Comment):  (Pt requires constant redirection to the task)  Processing Speed: Delayed  Postural Control:  Postural Control  Postural Control: Within Functional Limits (improved)  Trunk Control: retropulsion with R lean  Posture Comment: Pt domonstrated improved postural correction this date    Activity Tolerance:  Activity Tolerance  Endurance: Tolerates 10 - 20 min exercise with multiple rests  Treatments:  Therapeutic Activity  Therapeutic Activity Performed: Yes  Therapeutic Activity 1: Pt completed toilet transfer and STSx4 throughout treatment  Therapeutic Activity 2: Stand pivot transfer from commode to chair with Mod A with cuing for proper hand placement.    Bed Mobility  Bed Mobility: Yes  Bed Mobility 1  Bed Mobility 1: Supine to sitting  Level of Assistance 1: Contact guard  Bed Mobility Comments 1: HOB elevated  Bed Mobility 2  Bed Mobility  2: Scooting  Level of Assistance 2: Close supervision  Bed Mobility Comments 2: constant cuing for proper sequencing  Bed Mobility 3  Bed Mobility 3: Scooting  Level of Assistance 3: Minimum assistance  Bed Mobility Comments 3: Constant cuing for hip management to move to EOB    Ambulation/Gait Training  Ambulation/Gait Training Performed: Yes  Ambulation/Gait Training 1  Surface 1: Level tile  Device 1: Rolling walker  Gait Support Devices: Gait belt  Assistance 1: Contact guard  Quality of Gait 1: Inconsistent stride length  Comments/Distance (ft) 1: 100' x2 with frequent cues for FWW management  Ambulation/Gait Training 2  Surface 2: Level tile  Device 2: No device  Gait Support Devices: Gait  belt  Assistance 2: Hand held assistance, Arm in arm assistance, Maximum assistance  Quality of Gait 2:  (decreased stride and step length)  Transfers  Transfer: Yes  Transfer 1  Transfer From 1: Sit to  Transfer to 1: Stand  Technique 1: Sit to stand  Transfer Device 1: Walker  Transfer Level of Assistance 1: Minimum assistance  Trials/Comments 1: cuing for hand placement with elevated bed  Transfers 2  Transfer From 2: Stand to  Transfer to 2: Sit  Technique 2: Stand to sit  Transfer Device 2: Walker  Transfer Level of Assistance 2: Minimum assistance  Trials/Comments 2: showed improvement in controlling descent  Transfers 3  Transfer From 3: Sit to  Transfer to 3: Toilet  Technique 3: Stand to sit  Transfer Device 3: Walker  Transfer Level of Assistance 3: Contact guard  Trials/Comments 3: constant cues for hand placement and hip management  Transfers 4  Transfer From 4: Toilet to  Transfer to 4: Stand  Technique 4: Sit to stand  Transfer Device 4: Walker  Transfer Level of Assistance 4: Minimum assistance    Outcome Measures:  [facility] Basic Mobility  Turning from your back to your side while in a flat bed without using bedrails: A little  Moving from lying on your back to sitting on the side of a flat bed without using bedrails: A little  Moving to and from bed to chair (including a wheelchair): A little  Standing up from a chair using your arms (e.g. wheelchair or bedside chair): A little  To walk in hospital room: A little  Climbing 3-5 steps with railing: A lot  Basic Mobility - Total Score: 17    Education Documentation  Mobility Training, taught by Zaina May PTA at 10/13/2023  1:21 PM.  Learner: Patient  Readiness: Acceptance  Method: Explanation  Response: Verbalizes Understanding, Demonstrated Understanding    Education Comments  No comments found.        OP EDUCATION:  Education  Individual(s) Educated: Patient  Education Provided: Home Exercise Program (and FWW management)    Encounter Problems        Encounter Problems (Active)       Balance       STG - Maintains dynamic standing balance with CGA upper extremity support (Progressing)       Start:  10/03/23    Expected End:  10/17/23               Mobility       STG - Patient will ambulate >50 ft with CGA and no assistive device  (Progressing)       Start:  10/03/23    Expected End:  10/17/23               Transfers       STG - Patient will perform bed mobility with the use of bedrails and Jami x1 (Progressing)       Start:  10/03/23    Expected End:  10/17/23            STG - Patient will transfer sit to and from stand with CGA with no assistive device (Progressing)       Start:  10/03/23    Expected End:  10/17/23

## 2023-10-13 NOTE — NURSING NOTE
Report called to snf. Patient PIV removed and pure wick.  Pt discharged in stable condition with belongings including cell phone via [facility].

## 2023-10-13 NOTE — DISCHARGE SUMMARY
INTERNAL MEDICINE DISCHARGE SUMMARY             Discharge Diagnosis   Hypertensive emergency    Issues Requiring Follow-Up   Home safety after rehab     Test Results Pending At Discharge     Pending Labs       Order Current Status    Basic metabolic panel Collected (10/08/23 0857)    Extra Urine Gray Tube Collected (10/02/23 2033)    Urinalysis with Reflex Microscopic and Culture In process          Hospital Course   Miguel Angel Santos is a 73 y.o. male on day 10 of admission presenting with Hypertensive emergency.  PMHx of HTN, DMII per self-report, who presents for a syncopal event. Hx was difficult to obtain however from ED report it was mentioned that he was found down by his daughter and he was covered in urine, he doesn't recall any traumatic event and described it as syncope.   PT/OT saw the patient and recommended acute rehab, but rehab facilities denied.  Request has been changed to skilled nursing facility.  Both patient and family in agreement.  Family has significant concerns about this mentation change which they feel is more short-term.  Both neurology and neurosurgery has seen the patient, no urgent recommendations for MRI the brain from either team.   MRI brain on 10/10/23 showed no evidence of mass, cerebral infarction or hemorrhage. It did confirm *Disproportionate ventricular dilatation with possible interstitial  Edema and Hemosiderin deposition in a nonspecific pattern.      Pertinent Physical Exam At Time of Discharge     Physical Exam  General: Lying in bed without distress.  Cooperative.  Skin: No rashes ulcerations.  HEENT: Sclera is white.  Mucous membranes moist.  Neck: Supple.  No JVD.  Cardiac: Regular rate and rhythm, S1/S2 distant.  Lungs: Clear to auscultation bilaterally, no wheezing or crackles, no accessory muscle use at rest.  Abdomen: Soft, nontender, nondistended, BS +  Extremities: No cyanosis.  No lower extremity  edema.  Neurologic: Alert and oriented to self, to place, to time (but slow to answer month), oriented to situation today.  No focal deficits.  Appears to have some fluctuating ability to answer the orientation questions.  Psychiatric: Appropriate mood and behavior.  Currently no agitation.  Home Medications        Medication List      START taking these medications     aspirin 81 mg EC tablet; Take 1 tablet (81 mg) by mouth once daily.   carvedilol 12.5 mg tablet; Commonly known as: Coreg; Take 1 tablet (12.5   mg) by mouth every 12 hours. Do not start before October 8, 2023.   cholecalciferol 25 MCG (1000 UT) tablet; Commonly known as: Vitamin D-3;   Take 1 tablet (25 mcg) by mouth once daily. Do not start before October 8, 2023.   hydrALAZINE 100 mg tablet; Commonly known as: Apresoline; Take 1 tablet   (100 mg) by mouth 3 times a day.   insulin lispro 100 unit/mL injection; Commonly known as: HumaLOG; Inject   0-0.1 mL (0-10 Units) under the skin 3 times a day with meals. Take as   directed per insulin instructions. Do not start before October 8, 2023.   losartan 100 mg tablet; Commonly known as: Cozaar; Take 1 tablet (100   mg) by mouth once daily.   multivitamin with minerals tablet; Take 1 tablet by mouth once daily. Do   not start before October 8, 2023.   SITagliptin phosphate 50 mg tablet; Commonly known as: Januvia; Take 1   tablet (50 mg) by mouth once daily.     CHANGE how you take these medications     * Farxiga 10 mg; Generic drug: dapagliflozin propanediol; What changed:   Another medication with the same name was added. Make sure you understand   how and when to take each.   * dapagliflozin propanediol 10 mg; Commonly known as: Farxiga; Take 1   tablet (10 mg) by mouth once daily.; What changed: You were already taking   a medication with the same name, and this prescription was added. Make   sure you understand how and when to take each.  * This list has 2 medication(s) that are the same as other  medications   prescribed for you. Read the directions carefully, and ask your doctor or   other care provider to review them with you.     CONTINUE taking these medications     atorvastatin 40 mg tablet; Commonly known as: Lipitor   doxazosin 4 mg tablet; Commonly known as: Cardura; Take 1 tablet (4 mg)   by mouth once daily.   metFORMIN 1,000 mg tablet; Commonly known as: Glucophage     STOP taking these medications     chlorthalidone 50 mg tablet; Commonly known as: Hygroton   glipiZIDE 10 mg tablet; Commonly known as: Glucotrol   lisinopril 40 mg tablet   metoprolol succinate  mg 24 hr tablet; Commonly known as:   Toprol-XL   NIFEdipine ER 90 mg 24 hr tablet; Commonly known as: Adalat CC   potassium chloride CR 10 mEq ER tablet; Commonly known as: Klor-Con   Rybelsus 7 mg tablet; Generic drug: semaglutide     Outpatient Follow-Up     No future appointments.    Ree Reynolds MD

## 2023-10-13 NOTE — PROGRESS NOTES
DISCHARGE MEDICATION REVIEW BY PHARMACY       The following discharge medications were reviewed by a pharmacist: Yes  The following recommendations were made: not applicable/no changes  Please note that patient was not taking farxiga or rybelsus prior to admission due to cost. Going to a SNF.       Medication List      START taking these medications     aspirin 81 mg EC tablet; Take 1 tablet (81 mg) by mouth once daily.   carvedilol 12.5 mg tablet; Commonly known as: Coreg; Take 1 tablet (12.5   mg) by mouth every 12 hours. Do not start before October 8, 2023.   cholecalciferol 25 MCG (1000 UT) tablet; Commonly known as: Vitamin D-3;   Take 1 tablet (25 mcg) by mouth once daily. Do not start before October 8, 2023.   hydrALAZINE 100 mg tablet; Commonly known as: Apresoline; Take 1 tablet   (100 mg) by mouth 3 times a day.   insulin lispro 100 unit/mL injection; Commonly known as: HumaLOG; Inject   0-0.1 mL (0-10 Units) under the skin 3 times a day with meals. Take as   directed per insulin instructions. Do not start before October 8, 2023.   losartan 100 mg tablet; Commonly known as: Cozaar; Take 1 tablet (100   mg) by mouth once daily.   multivitamin with minerals tablet; Take 1 tablet by mouth once daily. Do   not start before October 8, 2023.   SITagliptin phosphate 50 mg tablet; Commonly known as: Januvia; Take 1   tablet (50 mg) by mouth once daily.     CHANGE how you take these medications     * Farxiga 10 mg; Generic drug: dapagliflozin propanediol; What changed:   Another medication with the same name was added. Make sure you understand   how and when to take each.   * dapagliflozin propanediol 10 mg; Commonly known as: Farxiga; Take 1   tablet (10 mg) by mouth once daily.; What changed: You were already taking   a medication with the same name, and this prescription was added. Make   sure you understand how and when to take each.  * This list has 2 medication(s) that are the same as other medications    prescribed for you. Read the directions carefully, and ask your doctor or   other care provider to review them with you.     CONTINUE taking these medications     atorvastatin 40 mg tablet; Commonly known as: Lipitor   doxazosin 4 mg tablet; Commonly known as: Cardura; Take 1 tablet (4 mg)   by mouth once daily.   metFORMIN 1,000 mg tablet; Commonly known as: Glucophage     STOP taking these medications     chlorthalidone 50 mg tablet; Commonly known as: Hygroton   glipiZIDE 10 mg tablet; Commonly known as: Glucotrol   lisinopril 40 mg tablet   metoprolol succinate  mg 24 hr tablet; Commonly known as:   Toprol-XL   NIFEdipine ER 90 mg 24 hr tablet; Commonly known as: Adalat CC   potassium chloride CR 10 mEq ER tablet; Commonly known as: Klor-Con   Rybelsus 7 mg tablet; Generic drug: semaglutide       Naomi Franklin, PharmD  [facility] Pharmacist

## 2023-10-27 ENCOUNTER — HOSPITAL ENCOUNTER (OUTPATIENT)
Dept: CARDIOLOGY | Facility: HOSPITAL | Age: 73
Discharge: HOME | End: 2023-10-27
Payer: MEDICARE

## 2023-10-27 LAB
ATRIAL RATE: 110 BPM
P AXIS: 67 DEGREES
P OFFSET: 172 MS
P ONSET: 135 MS
PR INTERVAL: 122 MS
Q ONSET: 196 MS
QRS COUNT: 18 BEATS
QRS DURATION: 168 MS
QT INTERVAL: 442 MS
QTC CALCULATION(BAZETT): 598 MS
QTC FREDERICIA: 541 MS
R AXIS: 88 DEGREES
T AXIS: -85 DEGREES
T OFFSET: 417 MS
VENTRICULAR RATE: 110 BPM

## 2023-10-27 PROCEDURE — 93005 ELECTROCARDIOGRAM TRACING: CPT

## 2023-11-15 DIAGNOSIS — G91.9 CEREBROSPINAL FLUID RHINORRHEA DUE TO HYDROCEPHALUS (MULTI): ICD-10-CM

## 2023-11-15 DIAGNOSIS — G91.2 NPH (NORMAL PRESSURE HYDROCEPHALUS) (MULTI): ICD-10-CM

## 2023-11-15 DIAGNOSIS — G96.01 CEREBROSPINAL FLUID RHINORRHEA DUE TO HYDROCEPHALUS (MULTI): ICD-10-CM

## 2023-11-15 DIAGNOSIS — I10 HYPERTENSION, UNSPECIFIED TYPE: ICD-10-CM

## 2023-11-20 ENCOUNTER — NURSING HOME VISIT (OUTPATIENT)
Dept: POST ACUTE CARE | Facility: EXTERNAL LOCATION | Age: 73
End: 2023-11-20
Payer: MEDICARE

## 2023-11-20 DIAGNOSIS — I11.9 HYPERTENSIVE HEART DISEASE WITHOUT HEART FAILURE: ICD-10-CM

## 2023-11-20 DIAGNOSIS — E11.65 TYPE 2 DIABETES MELLITUS WITH HYPERGLYCEMIA, WITHOUT LONG-TERM CURRENT USE OF INSULIN (MULTI): Primary | ICD-10-CM

## 2023-11-20 DIAGNOSIS — I10 ESSENTIAL HYPERTENSION, BENIGN: ICD-10-CM

## 2023-11-20 DIAGNOSIS — Z95.0 S/P CARDIAC PACEMAKER PROCEDURE: ICD-10-CM

## 2023-11-20 PROCEDURE — 99305 1ST NF CARE MODERATE MDM 35: CPT | Performed by: INTERNAL MEDICINE

## 2023-11-20 NOTE — LETTER
Patient: Miguel Angel Santos  : 1950    Encounter Date: 2023    HISTORY & PHYSICAL    Subjective  Chief complaint: Miguel Angel Santos is a 73 y.o. male who is a long term resident patient being seen and evaluated for multiple medical problems.  Patient presents for weakness.    HPI:  Patient is a 73 year old male with a past medical history of HTN, type 2 diabetes, and dementia. Patient was admitted to the nursing facility after being in the hospital. Patient presented to the ED for worsening cognitive and behavioral change. Patient lives alone and is non-compliant with medications. Patient was evaluated and discharged to the nursing facility for daily care and medication management.         Past Medical History:   Diagnosis Date   • Alzheimer's disease (CMS/HCC)    • Dementia (CMS/HCC)    • Diabetes mellitus (CMS/HCC)    • History of falling    • HLD (hyperlipidemia)    • Hypertension, essential    • Hypokalemia    • Malnutrition (CMS/HCC)    • Type 2 diabetes mellitus (CMS/HCC)    • Unspecified age-related cataract        Past Surgical History:   Procedure Laterality Date   • CARDIAC SURGERY         Family History   Problem Relation Name Age of Onset   • No Known Problems Mother     • No Known Problems Father         Social History     Socioeconomic History   • Marital status:      Spouse name: Not on file   • Number of children: Not on file   • Years of education: Not on file   • Highest education level: Not on file   Occupational History   • Not on file   Tobacco Use   • Smoking status: Some Days     Types: Cigars   • Smokeless tobacco: Never   Substance and Sexual Activity   • Alcohol use: Yes     Alcohol/week: 2.0 standard drinks of alcohol     Types: 2 Shots of liquor per week   • Drug use: Not on file   • Sexual activity: Not on file   Other Topics Concern   • Not on file   Social History Narrative   • Not on file     Social Determinants of Health     Financial Resource Strain: Not on file   Food  Insecurity: No Food Insecurity (10/3/2023)    Hunger Vital Sign    • Worried About Running Out of Food in the Last Year: Never true    • Ran Out of Food in the Last Year: Never true   Transportation Needs: Not on file   Physical Activity: Not on file   Stress: Not on file   Social Connections: Unknown (10/3/2023)    Social Connection and Isolation Panel [NHANES]    • Frequency of Communication with Friends and Family: Not on file    • Frequency of Social Gatherings with Friends and Family: Not on file    • Attends Oriental orthodox Services: Not on file    • Active Member of Clubs or Organizations: Not on file    • Attends Club or Organization Meetings: Not on file    • Marital Status:    Intimate Partner Violence: Not on file   Housing Stability: High Risk (10/3/2023)    Housing Stability Vital Sign    • Unable to Pay for Housing in the Last Year: No    • Number of Places Lived in the Last Year: 1    • Unstable Housing in the Last Year: Yes       Vital signs: 162/92, 97.5, 60, 20, 179.0, 98%    Objective  Physical Exam  HENT:      Head: Normocephalic and atraumatic.      Nose: Nose normal.      Mouth/Throat:      Mouth: Mucous membranes are moist.      Pharynx: Oropharynx is clear.   Eyes:      Extraocular Movements: Extraocular movements intact.      Pupils: Pupils are equal, round, and reactive to light.   Cardiovascular:      Rate and Rhythm: Normal rate and regular rhythm.   Pulmonary:      Effort: No respiratory distress.      Breath sounds: Normal breath sounds. No wheezing, rhonchi or rales.   Abdominal:      General: Bowel sounds are normal. There is no distension.      Palpations: Abdomen is soft.      Tenderness: There is no abdominal tenderness. There is no guarding.   Musculoskeletal:      Right lower leg: No edema.      Left lower leg: No edema.   Skin:     General: Skin is warm and dry.   Neurological:      Mental Status: He is alert. Mental status is at baseline.         Assessment/Plan  Problem List  Items Addressed This Visit       Type 2 diabetes mellitus with hyperglycemia, without long-term current use of insulin (CMS/[facility]) - Primary     Continue current medication         Hypertensive heart disease without heart failure     Continue current medications         Essential hypertension, benign     Continue current medication         S/P cardiac pacemaker procedure     Monitor          Medications, treatments, and labs reviewed  Continue medications and treatments as listed in PCC    Scribe Attestation  IChristina Scribdonny   attest that this documentation has been prepared under the direction and in the presence of Jose L Michelle DO.    Provider Attestation - Scribe documentation  All medical record entries made by the Scribe were at my direction and personally dictated by me. I have reviewed the chart and agree that the record accurately reflects my personal performance of the history, physical exam, discussion and plan.    Jose L Michelle DO          Electronically Signed By: Jose L Michelle DO   11/29/23  7:21 PM

## 2023-11-28 ENCOUNTER — NURSING HOME VISIT (OUTPATIENT)
Dept: POST ACUTE CARE | Facility: EXTERNAL LOCATION | Age: 73
End: 2023-11-28
Payer: MEDICARE

## 2023-11-28 DIAGNOSIS — I11.9 HYPERTENSIVE HEART DISEASE WITHOUT HEART FAILURE: ICD-10-CM

## 2023-11-28 DIAGNOSIS — I10 ESSENTIAL HYPERTENSION, BENIGN: Primary | ICD-10-CM

## 2023-11-28 DIAGNOSIS — E11.65 TYPE 2 DIABETES MELLITUS WITH HYPERGLYCEMIA, WITHOUT LONG-TERM CURRENT USE OF INSULIN (MULTI): ICD-10-CM

## 2023-11-28 PROCEDURE — 99309 SBSQ NF CARE MODERATE MDM 30: CPT | Performed by: REGISTERED NURSE

## 2023-11-28 NOTE — LETTER
Patient: Miguel Angel Santos  : 1950    Encounter Date: 2023    PROGRESS NOTE    Subjective  Chief complaint: Miguel Angel Santos is a 73 y.o. male patient being seen and evaluated for monthly general medical care and follow-up    HPI:  23 Patient presents for general medical care and f/u.  Patient seen and examined at bedside.  No issues per nursing.  Patient has no acute complaints.          Objective  Vital signs: 169/92, 97.5, 60, 179.0, 98%    Physical Exam  Constitutional:       General: He is not in acute distress.  Eyes:      Extraocular Movements: Extraocular movements intact.   Pulmonary:      Effort: Pulmonary effort is normal.   Musculoskeletal:      Cervical back: Neck supple.   Neurological:      Mental Status: He is alert.   Psychiatric:         Mood and Affect: Mood normal.         Behavior: Behavior is cooperative.         Assessment/Plan  Problem List Items Addressed This Visit       Type 2 diabetes mellitus with hyperglycemia, without long-term current use of insulin (CMS/[facility])     Continue current medication          Hypertensive heart disease without heart failure     Continue current medications          Essential hypertension, benign - Primary     Continue current medication           Medications, treatments, and labs reviewed  Continue medications and treatments as listed in EMR    Scribe Attestation  I, Papito Acosta   attest that this documentation has been prepared under the direction and in the presence of YESENIA Celestin.    Provider Attestation - Scribe documentation  All medical record entries made by the Scribe were at my direction and personally dictated by me. I have reviewed the chart and agree that the record accurately reflects my personal performance of the history, physical exam, discussion and plan.    YESENIA Celestin      Electronically Signed By: YESENIA Celestin   23  2:53 PM

## 2023-11-28 NOTE — PROGRESS NOTES
HISTORY & PHYSICAL    Subjective   Chief complaint: Miguel Angel Santos is a 73 y.o. male who is a long term resident patient being seen and evaluated for multiple medical problems.  Patient presents for weakness.    HPI:  Patient is a 73 year old male with a past medical history of HTN, type 2 diabetes, and dementia. Patient was admitted to the nursing facility after being in the hospital. Patient presented to the ED for worsening cognitive and behavioral change. Patient lives alone and is non-compliant with medications. Patient was evaluated and discharged to the nursing facility for daily care and medication management.         Past Medical History:   Diagnosis Date    Alzheimer's disease (CMS/[facility])     Dementia (CMS/[facility])     Diabetes mellitus (CMS/[facility])     History of falling     HLD (hyperlipidemia)     Hypertension, essential     Hypokalemia     Malnutrition (CMS/[facility])     Type 2 diabetes mellitus (CMS/[facility])     Unspecified age-related cataract        Past Surgical History:   Procedure Laterality Date    CARDIAC SURGERY         Family History   Problem Relation Name Age of Onset    No Known Problems Mother      No Known Problems Father         Social History     Socioeconomic History    Marital status:      Spouse name: Not on file    Number of children: Not on file    Years of education: Not on file    Highest education level: Not on file   Occupational History    Not on file   Tobacco Use    Smoking status: Some Days     Types: Cigars    Smokeless tobacco: Never   Substance and Sexual Activity    Alcohol use: Yes     Alcohol/week: 2.0 standard drinks of alcohol     Types: 2 Shots of liquor per week    Drug use: Not on file    Sexual activity: Not on file   Other Topics Concern    Not on file   Social History Narrative    Not on file     Social Determinants of Health     Financial Resource Strain: Not on file   Food Insecurity: No Food Insecurity (10/3/2023)    Hunger Vital Sign     Worried About Running Out  of Food in the Last Year: Never true     Ran Out of Food in the Last Year: Never true   Transportation Needs: Not on file   Physical Activity: Not on file   Stress: Not on file   Social Connections: Unknown (10/3/2023)    Social Connection and Isolation Panel [NHANES]     Frequency of Communication with Friends and Family: Not on file     Frequency of Social Gatherings with Friends and Family: Not on file     Attends Restorationism Services: Not on file     Active Member of Clubs or Organizations: Not on file     Attends Club or Organization Meetings: Not on file     Marital Status:    Intimate Partner Violence: Not on file   Housing Stability: High Risk (10/3/2023)    Housing Stability Vital Sign     Unable to Pay for Housing in the Last Year: No     Number of Places Lived in the Last Year: 1     Unstable Housing in the Last Year: Yes       Vital signs: 162/92, 97.5, 60, 20, 179.0, 98%    Objective   Physical Exam  HENT:      Head: Normocephalic and atraumatic.      Nose: Nose normal.      Mouth/Throat:      Mouth: Mucous membranes are moist.      Pharynx: Oropharynx is clear.   Eyes:      Extraocular Movements: Extraocular movements intact.      Pupils: Pupils are equal, round, and reactive to light.   Cardiovascular:      Rate and Rhythm: Normal rate and regular rhythm.   Pulmonary:      Effort: No respiratory distress.      Breath sounds: Normal breath sounds. No wheezing, rhonchi or rales.   Abdominal:      General: Bowel sounds are normal. There is no distension.      Palpations: Abdomen is soft.      Tenderness: There is no abdominal tenderness. There is no guarding.   Musculoskeletal:      Right lower leg: No edema.      Left lower leg: No edema.   Skin:     General: Skin is warm and dry.   Neurological:      Mental Status: He is alert. Mental status is at baseline.         Assessment/Plan   Problem List Items Addressed This Visit       Type 2 diabetes mellitus with hyperglycemia, without long-term  current use of insulin (CMS/[facility]) - Primary     Continue current medication         Hypertensive heart disease without heart failure     Continue current medications         Essential hypertension, benign     Continue current medication         S/P cardiac pacemaker procedure     Monitor          Medications, treatments, and labs reviewed  Continue medications and treatments as listed in PCC    Scribe Attestation  I, Papito Acosta   attest that this documentation has been prepared under the direction and in the presence of Jose L Michelle DO.    Provider Attestation - Scribe documentation  All medical record entries made by the Scribe were at my direction and personally dictated by me. I have reviewed the chart and agree that the record accurately reflects my personal performance of the history, physical exam, discussion and plan.    Jose L Michelle DO

## 2023-11-29 PROBLEM — Z95.0 S/P CARDIAC PACEMAKER PROCEDURE: Status: ACTIVE | Noted: 2018-09-24

## 2023-11-29 PROBLEM — E11.3592: Chronic | Status: ACTIVE | Noted: 2018-10-03

## 2023-12-04 NOTE — PROGRESS NOTES
PROGRESS NOTE    Subjective   Chief complaint: Miguel Angel Santos is a 73 y.o. male patient being seen and evaluated for monthly general medical care and follow-up    HPI:  11/28/23 Patient presents for general medical care and f/u.  Patient seen and examined at bedside.  No issues per nursing.  Patient has no acute complaints.          Objective   Vital signs: 169/92, 97.5, 60, 179.0, 98%    Physical Exam  Constitutional:       General: He is not in acute distress.  Eyes:      Extraocular Movements: Extraocular movements intact.   Pulmonary:      Effort: Pulmonary effort is normal.   Musculoskeletal:      Cervical back: Neck supple.   Neurological:      Mental Status: He is alert.   Psychiatric:         Mood and Affect: Mood normal.         Behavior: Behavior is cooperative.         Assessment/Plan   Problem List Items Addressed This Visit       Type 2 diabetes mellitus with hyperglycemia, without long-term current use of insulin (CMS/[facility])     Continue current medication          Hypertensive heart disease without heart failure     Continue current medications          Essential hypertension, benign - Primary     Continue current medication           Medications, treatments, and labs reviewed  Continue medications and treatments as listed in EMR    Scribe Attestation  I, Papito Acosta   attest that this documentation has been prepared under the direction and in the presence of YESENIA Celestin.    Provider Attestation - Scribe documentation  All medical record entries made by the Scribe were at my direction and personally dictated by me. I have reviewed the chart and agree that the record accurately reflects my personal performance of the history, physical exam, discussion and plan.    YESENIA Celestin

## 2023-12-20 ENCOUNTER — NURSING HOME VISIT (OUTPATIENT)
Dept: POST ACUTE CARE | Facility: EXTERNAL LOCATION | Age: 73
End: 2023-12-20
Payer: MEDICARE

## 2023-12-20 DIAGNOSIS — I10 ESSENTIAL HYPERTENSION, BENIGN: Primary | ICD-10-CM

## 2023-12-20 DIAGNOSIS — E11.65 TYPE 2 DIABETES MELLITUS WITH HYPERGLYCEMIA, WITHOUT LONG-TERM CURRENT USE OF INSULIN (MULTI): ICD-10-CM

## 2023-12-20 DIAGNOSIS — I11.9 HYPERTENSIVE HEART DISEASE WITHOUT HEART FAILURE: ICD-10-CM

## 2023-12-20 PROCEDURE — 99309 SBSQ NF CARE MODERATE MDM 30: CPT | Performed by: REGISTERED NURSE

## 2023-12-20 NOTE — LETTER
Patient: Miguel Angel Santos  : 1950    Encounter Date: 2023    PROGRESS NOTE    Subjective  Chief complaint: Miguel Angel Santos is a 73 y.o. male patient being seen and evaluated for monthly general medical care and follow-up    HPI:  23 Patient presents for general medical care and f/u.  Patient seen and examined at bedside.  No issues per nursing.  Patient has no acute complaints.          Objective  Vital signs: 166/84, 97.7, 60, 215.0, 97%    Physical Exam  Constitutional:       General: He is not in acute distress.  Eyes:      Extraocular Movements: Extraocular movements intact.   Pulmonary:      Effort: Pulmonary effort is normal.   Musculoskeletal:      Cervical back: Neck supple.   Neurological:      Mental Status: He is alert.   Psychiatric:         Mood and Affect: Mood normal.         Behavior: Behavior is cooperative.         Assessment/Plan  Problem List Items Addressed This Visit       Type 2 diabetes mellitus with hyperglycemia, without long-term current use of insulin (CMS/[facility])     Continue current medication          Hypertensive heart disease without heart failure     Continue current medications          Essential hypertension, benign - Primary     Continue current medication           Medications, treatments, and labs reviewed  Continue medications and treatments as listed in EMR    Scribe Attestation  I, Papito Acosta   attest that this documentation has been prepared under the direction and in the presence of YESENIA Celestin.    Provider Attestation - Scribe documentation  All medical record entries made by the Scribe were at my direction and personally dictated by me. I have reviewed the chart and agree that the record accurately reflects my personal performance of the history, physical exam, discussion and plan.    YESENIA Celestin      Electronically Signed By: YESENIA Celestin   24  4:04 PM

## 2023-12-22 ENCOUNTER — NURSING HOME VISIT (OUTPATIENT)
Dept: POST ACUTE CARE | Facility: EXTERNAL LOCATION | Age: 73
End: 2023-12-22
Payer: MEDICARE

## 2023-12-22 DIAGNOSIS — E11.65 TYPE 2 DIABETES MELLITUS WITH HYPERGLYCEMIA, WITHOUT LONG-TERM CURRENT USE OF INSULIN (MULTI): Primary | ICD-10-CM

## 2023-12-22 PROCEDURE — 99308 SBSQ NF CARE LOW MDM 20: CPT | Performed by: REGISTERED NURSE

## 2023-12-22 NOTE — LETTER
Patient: Miguel Angel Santos  : 1950    Encounter Date: 2023    PROGRESS NOTE    Subjective  Chief complaint: Miguel Angel Santos is a 73 y.o. male who is a long term care patient being seen and evaluated for follow up.     HPI:  HPI patient seen today for medication reduction and medication review  Objective  Vital signs: 166/84, 97.7, 60, 98.0, 97%    Physical Exam  Constitutional:       General: He is not in acute distress.  Eyes:      Extraocular Movements: Extraocular movements intact.   Pulmonary:      Effort: Pulmonary effort is normal.   Musculoskeletal:      Cervical back: Neck supple.   Neurological:      Mental Status: He is alert.   Psychiatric:         Mood and Affect: Mood normal.         Behavior: Behavior is cooperative.         Assessment/Plan  Problem List Items Addressed This Visit       Type 2 diabetes mellitus with hyperglycemia, without long-term current use of insulin (CMS/[facility]) - Primary     decrease glipizide d/t low am bs           Medications, treatments, and labs reviewed  Continue medications and treatments as listed in P[facility]    Scribe Attestation  IChristina Scribe   attest that this documentation has been prepared under the direction and in the presence of YESENIA Celestin.    Provider Attestation - Scribe documentation  All medical record entries made by the Scribe were at my direction and personally dictated by me. I have reviewed the chart and agree that the record accurately reflects my personal performance of the history, physical exam, discussion and plan.    YESENIA Celestin          Electronically Signed By: YESENIA Celestin   24  1:25 PM

## 2023-12-29 ENCOUNTER — NURSING HOME VISIT (OUTPATIENT)
Dept: POST ACUTE CARE | Facility: EXTERNAL LOCATION | Age: 73
End: 2023-12-29
Payer: MEDICARE

## 2023-12-29 DIAGNOSIS — R35.89 POLYURIA: ICD-10-CM

## 2023-12-29 DIAGNOSIS — N32.81 OAB (OVERACTIVE BLADDER): Primary | ICD-10-CM

## 2023-12-29 PROCEDURE — 99309 SBSQ NF CARE MODERATE MDM 30: CPT | Performed by: REGISTERED NURSE

## 2023-12-29 NOTE — LETTER
Patient: Miguel Angel Santos  : 1950    Encounter Date: 2023    PROGRESS NOTE    Subjective  Chief complaint: Miguel Angel Santos is a 73 y.o. male who is a long term care patient being seen and evaluated for follow up.     HPI:  HPI patient having polyuria, denies suprapubic pain no CVA tenderness afebrile  Objective  Vital signs: 166/84, 97.7, 60, 104.0, 97%    Physical Exam  Constitutional:       General: He is not in acute distress.  Eyes:      Extraocular Movements: Extraocular movements intact.   Pulmonary:      Effort: Pulmonary effort is normal.   Musculoskeletal:      Cervical back: Neck supple.   Neurological:      Mental Status: He is alert.   Psychiatric:         Mood and Affect: Mood normal.         Behavior: Behavior is cooperative.         Assessment/Plan  Problem List Items Addressed This Visit       Polyuria     uacns         OAB (overactive bladder) - Primary     Oxybutynin           Medications, treatments, and labs reviewed  Continue medications and treatments as listed in [facility]    Scribe Attestation  IChristina Scribe   attest that this documentation has been prepared under the direction and in the presence of YESENIA Celestin.    Provider Attestation - Scribe documentation  All medical record entries made by the Scribe were at my direction and personally dictated by me. I have reviewed the chart and agree that the record accurately reflects my personal performance of the history, physical exam, discussion and plan.    YESENIA Celestin          Electronically Signed By: YESENIA Celestin   24 11:33 AM

## 2024-01-02 NOTE — PROGRESS NOTES
PROGRESS NOTE    Subjective   Chief complaint: Miguel Angel Santos is a 73 y.o. male patient being seen and evaluated for monthly general medical care and follow-up    HPI:  12/20/23 Patient presents for general medical care and f/u.  Patient seen and examined at bedside.  No issues per nursing.  Patient has no acute complaints.          Objective   Vital signs: 166/84, 97.7, 60, 215.0, 97%    Physical Exam  Constitutional:       General: He is not in acute distress.  Eyes:      Extraocular Movements: Extraocular movements intact.   Pulmonary:      Effort: Pulmonary effort is normal.   Musculoskeletal:      Cervical back: Neck supple.   Neurological:      Mental Status: He is alert.   Psychiatric:         Mood and Affect: Mood normal.         Behavior: Behavior is cooperative.         Assessment/Plan   Problem List Items Addressed This Visit       Type 2 diabetes mellitus with hyperglycemia, without long-term current use of insulin (CMS/[facility])     Continue current medication          Hypertensive heart disease without heart failure     Continue current medications          Essential hypertension, benign - Primary     Continue current medication           Medications, treatments, and labs reviewed  Continue medications and treatments as listed in EMR    Scribe Attestation  I, Papito Acosta   attest that this documentation has been prepared under the direction and in the presence of YESENIA Celestin.    Provider Attestation - Scribe documentation  All medical record entries made by the Scribe were at my direction and personally dictated by me. I have reviewed the chart and agree that the record accurately reflects my personal performance of the history, physical exam, discussion and plan.    YESENIA Celestin

## 2024-01-03 NOTE — PROGRESS NOTES
PROGRESS NOTE    Subjective   Chief complaint: Miguel Angel Santos is a 73 y.o. male who is a long term care patient being seen and evaluated for follow up.     HPI:  HPI patient seen today for medication reduction and medication review  Objective   Vital signs: 166/84, 97.7, 60, 98.0, 97%    Physical Exam  Constitutional:       General: He is not in acute distress.  Eyes:      Extraocular Movements: Extraocular movements intact.   Pulmonary:      Effort: Pulmonary effort is normal.   Musculoskeletal:      Cervical back: Neck supple.   Neurological:      Mental Status: He is alert.   Psychiatric:         Mood and Affect: Mood normal.         Behavior: Behavior is cooperative.         Assessment/Plan   Problem List Items Addressed This Visit       Type 2 diabetes mellitus with hyperglycemia, without long-term current use of insulin (CMS/[facility]) - Primary     decrease glipizide d/t low am bs           Medications, treatments, and labs reviewed  Continue medications and treatments as listed in P[facility]    Scribe Attestation  IChristina Scribe   attest that this documentation has been prepared under the direction and in the presence of YESENIA Celestin.    Provider Attestation - Scribe documentation  All medical record entries made by the Scribe were at my direction and personally dictated by me. I have reviewed the chart and agree that the record accurately reflects my personal performance of the history, physical exam, discussion and plan.    YESENIA Celestin

## 2024-01-08 NOTE — PROGRESS NOTES
PROGRESS NOTE    Subjective   Chief complaint: Miguel Angel Santos is a 73 y.o. male who is a long term care patient being seen and evaluated for follow up.     HPI:  HPI patient having polyuria, denies suprapubic pain no CVA tenderness afebrile  Objective   Vital signs: 166/84, 97.7, 60, 104.0, 97%    Physical Exam  Constitutional:       General: He is not in acute distress.  Eyes:      Extraocular Movements: Extraocular movements intact.   Pulmonary:      Effort: Pulmonary effort is normal.   Musculoskeletal:      Cervical back: Neck supple.   Neurological:      Mental Status: He is alert.   Psychiatric:         Mood and Affect: Mood normal.         Behavior: Behavior is cooperative.         Assessment/Plan   Problem List Items Addressed This Visit       Polyuria     uacns         OAB (overactive bladder) - Primary     Oxybutynin           Medications, treatments, and labs reviewed  Continue medications and treatments as listed in [facility]    Scribe Attestation  I, Papito Acosta   attest that this documentation has been prepared under the direction and in the presence of YESENIA Celestin.    Provider Attestation - Scribe documentation  All medical record entries made by the Scribe were at my direction and personally dictated by me. I have reviewed the chart and agree that the record accurately reflects my personal performance of the history, physical exam, discussion and plan.    YESENIA Celestin

## 2024-01-09 ENCOUNTER — NURSING HOME VISIT (OUTPATIENT)
Dept: POST ACUTE CARE | Facility: EXTERNAL LOCATION | Age: 74
End: 2024-01-09
Payer: MEDICARE

## 2024-01-09 DIAGNOSIS — E11.65 TYPE 2 DIABETES MELLITUS WITH HYPERGLYCEMIA, WITHOUT LONG-TERM CURRENT USE OF INSULIN (MULTI): Primary | ICD-10-CM

## 2024-01-09 PROCEDURE — 99308 SBSQ NF CARE LOW MDM 20: CPT | Performed by: REGISTERED NURSE

## 2024-01-09 NOTE — LETTER
Patient: Miguel Angel Santos  : 1950    Encounter Date: 2024    PROGRESS NOTE    Subjective  Chief complaint: Miguel Angel Santos is a 73 y.o. male who is a long term care patient being seen and evaluated for low blood sugar.    HPI:  HPI nurse notified me patient had low blood sugar ordered a chest neck to be given the patient patient stable and alert  Objective  Vital signs: 168/84, 97.8, 62, 115.0, 97%    Physical Exam  Constitutional:       General: He is not in acute distress.  Eyes:      Extraocular Movements: Extraocular movements intact.   Pulmonary:      Effort: Pulmonary effort is normal.   Musculoskeletal:      Cervical back: Neck supple.   Neurological:      Mental Status: He is alert.   Psychiatric:         Mood and Affect: Mood normal.         Behavior: Behavior is cooperative.         Assessment/Plan  Problem List Items Addressed This Visit       Type 2 diabetes mellitus with hyperglycemia, without long-term current use of insulin (CMS/[facility]) - Primary     Give hs snack monitor           Medications, treatments, and labs reviewed  Continue medications and treatments as listed in [facility]    Scribe Attestation  I, Papito Acosta   attest that this documentation has been prepared under the direction and in the presence of YESENIA Celestin.    Provider Attestation - Scribe documentation  All medical record entries made by the Scribe were at my direction and personally dictated by me. I have reviewed the chart and agree that the record accurately reflects my personal performance of the history, physical exam, discussion and plan.    YESENIA Celestin          Electronically Signed By: YESENIA Celestin   24 10:04 AM

## 2024-01-16 NOTE — PROGRESS NOTES
PROGRESS NOTE    Subjective   Chief complaint: Miguel Angel Santos is a 73 y.o. male who is a long term care patient being seen and evaluated for low blood sugar.    HPI:  HPI nurse notified me patient had low blood sugar ordered a chest neck to be given the patient patient stable and alert  Objective   Vital signs: 168/84, 97.8, 62, 115.0, 97%    Physical Exam  Constitutional:       General: He is not in acute distress.  Eyes:      Extraocular Movements: Extraocular movements intact.   Pulmonary:      Effort: Pulmonary effort is normal.   Musculoskeletal:      Cervical back: Neck supple.   Neurological:      Mental Status: He is alert.   Psychiatric:         Mood and Affect: Mood normal.         Behavior: Behavior is cooperative.         Assessment/Plan   Problem List Items Addressed This Visit       Type 2 diabetes mellitus with hyperglycemia, without long-term current use of insulin (CMS/[facility]) - Primary     Give hs snack monitor           Medications, treatments, and labs reviewed  Continue medications and treatments as listed in [facility]    Scribe Attestation  IChristina Scribe   attest that this documentation has been prepared under the direction and in the presence of YESENIA Celestin.    Provider Attestation - Scribe documentation  All medical record entries made by the Scribe were at my direction and personally dictated by me. I have reviewed the chart and agree that the record accurately reflects my personal performance of the history, physical exam, discussion and plan.    YESENIA Celestin         Pt states records have been received by [facility].  They do not use epic so no uploads of visit.

## 2024-01-23 ENCOUNTER — NURSING HOME VISIT (OUTPATIENT)
Dept: POST ACUTE CARE | Facility: EXTERNAL LOCATION | Age: 74
End: 2024-01-23
Payer: MEDICARE

## 2024-01-23 DIAGNOSIS — E11.9 TYPE 2 DIABETES MELLITUS WITHOUT COMPLICATION, WITHOUT LONG-TERM CURRENT USE OF INSULIN (MULTI): Primary | ICD-10-CM

## 2024-01-23 PROCEDURE — 99308 SBSQ NF CARE LOW MDM 20: CPT | Performed by: REGISTERED NURSE

## 2024-01-23 NOTE — LETTER
Patient: Miguel Angel Santos  : 1950    Encounter Date: 2024    PROGRESS NOTE    Subjective  Chief complaint: Miguel Angel Santos is a 73 y.o. male who is an acute skilled patient being seen and evaluated for weakness    HPI:  24 Patient has no new complaints or concerns today.  Continues working towards goals in therapy.  Denies constitutional symptoms.      Objective  Vital signs: 128/67, 98.7, 72, 20, 132.0, 97%    Physical Exam  Constitutional:       General: He is not in acute distress.  Eyes:      Extraocular Movements: Extraocular movements intact.   Pulmonary:      Effort: Pulmonary effort is normal.   Musculoskeletal:      Cervical back: Neck supple.   Neurological:      Mental Status: He is alert.   Psychiatric:         Mood and Affect: Mood normal.         Behavior: Behavior is cooperative.         Assessment/Plan  Problem List Items Addressed This Visit       Diabetes mellitus (CMS/[facility]) - Primary     Bs low decrease glipizide 5mg bid           Medications, treatments, and labs reviewed  Continue medications and treatments as listed in P[facility]    Scribe Attestation  IChristina Scribe   attest that this documentation has been prepared under the direction and in the presence of YESENIA Celestin.    Provider Attestation - Scribe documentation  All medical record entries made by the Scribe were at my direction and personally dictated by me. I have reviewed the chart and agree that the record accurately reflects my personal performance of the history, physical exam, discussion and plan.    YESENIA Celestin        Electronically Signed By: YESENIA Celestin   24 10:53 AM

## 2024-01-24 NOTE — PROGRESS NOTES
PROGRESS NOTE    Subjective   Chief complaint: Miguel Angel Santos is a 73 y.o. male who is an acute skilled patient being seen and evaluated for weakness    HPI:  1/23/24 Patient has no new complaints or concerns today.  Continues working towards goals in therapy.  Denies constitutional symptoms.      Objective   Vital signs: 128/67, 98.7, 72, 20, 132.0, 97%    Physical Exam  Constitutional:       General: He is not in acute distress.  Eyes:      Extraocular Movements: Extraocular movements intact.   Pulmonary:      Effort: Pulmonary effort is normal.   Musculoskeletal:      Cervical back: Neck supple.   Neurological:      Mental Status: He is alert.   Psychiatric:         Mood and Affect: Mood normal.         Behavior: Behavior is cooperative.         Assessment/Plan   Problem List Items Addressed This Visit       Diabetes mellitus (CMS/[facility]) - Primary     Bs low decrease glipizide 5mg bid           Medications, treatments, and labs reviewed  Continue medications and treatments as listed in [facility]    Scribe Attestation  IChristina Scribe   attest that this documentation has been prepared under the direction and in the presence of YESENIA Celestin.    Provider Attestation - Scribe documentation  All medical record entries made by the Scribe were at my direction and personally dictated by me. I have reviewed the chart and agree that the record accurately reflects my personal performance of the history, physical exam, discussion and plan.    YESENIA Celestin

## 2024-01-25 PROBLEM — N32.81 OAB (OVERACTIVE BLADDER): Status: ACTIVE | Noted: 2024-01-25

## 2024-01-25 PROBLEM — R35.89 POLYURIA: Status: ACTIVE | Noted: 2024-01-25

## 2024-01-31 ENCOUNTER — NURSING HOME VISIT (OUTPATIENT)
Dept: POST ACUTE CARE | Facility: EXTERNAL LOCATION | Age: 74
End: 2024-01-31
Payer: MEDICARE

## 2024-01-31 DIAGNOSIS — I10 ESSENTIAL HYPERTENSION, BENIGN: Primary | ICD-10-CM

## 2024-01-31 DIAGNOSIS — E11.65 TYPE 2 DIABETES MELLITUS WITH HYPERGLYCEMIA, WITHOUT LONG-TERM CURRENT USE OF INSULIN (MULTI): ICD-10-CM

## 2024-01-31 DIAGNOSIS — N32.81 OAB (OVERACTIVE BLADDER): ICD-10-CM

## 2024-01-31 PROCEDURE — 99309 SBSQ NF CARE MODERATE MDM 30: CPT | Performed by: REGISTERED NURSE

## 2024-01-31 NOTE — LETTER
Patient: Miguel Angel Santos  : 1950    Encounter Date: 2024    PROGRESS NOTE    Subjective  Chief complaint: Miguel Angel Santos is a 74 y.o. male patient being seen and evaluated for monthly general medical care and follow-up    HPI:  24 Patient presents for general medical care and f/u.  Patient seen and examined at bedside.  No issues per nursing.  Patient has no acute complaints.          Objective  Vital signs: 128/67    Physical Exam  Constitutional:       General: He is not in acute distress.  Eyes:      Extraocular Movements: Extraocular movements intact.   Pulmonary:      Effort: Pulmonary effort is normal.   Musculoskeletal:      Cervical back: Neck supple.   Neurological:      Mental Status: He is alert.   Psychiatric:         Mood and Affect: Mood normal.         Behavior: Behavior is cooperative.         Assessment/Plan  Problem List Items Addressed This Visit       Type 2 diabetes mellitus with hyperglycemia, without long-term current use of insulin (CMS/[facility])     Give hs snack monitor          Essential hypertension, benign - Primary     Continue current medication          OAB (overactive bladder)     Oxybutynin           Medications, treatments, and labs reviewed  Continue medications and treatments as listed in EMR    Scribe Attestation  IChristina Scribe   attest that this documentation has been prepared under the direction and in the presence of YESENIA Celetsin.    Provider Attestation - Scribe documentation  All medical record entries made by the Scribe were at my direction and personally dictated by me. I have reviewed the chart and agree that the record accurately reflects my personal performance of the history, physical exam, discussion and plan.    YESENIA Celestin      Electronically Signed By: YESENIA Celestin   24 12:00 PM

## 2024-02-01 PROBLEM — E11.9 DIABETES MELLITUS (MULTI): Status: ACTIVE | Noted: 2024-02-01

## 2024-02-01 NOTE — PROGRESS NOTES
PROGRESS NOTE    Subjective   Chief complaint: Miguel Angel Santos is a 74 y.o. male patient being seen and evaluated for monthly general medical care and follow-up    HPI:  1/31/24 Patient presents for general medical care and f/u.  Patient seen and examined at bedside.  No issues per nursing.  Patient has no acute complaints.          Objective   Vital signs: 128/67    Physical Exam  Constitutional:       General: He is not in acute distress.  Eyes:      Extraocular Movements: Extraocular movements intact.   Pulmonary:      Effort: Pulmonary effort is normal.   Musculoskeletal:      Cervical back: Neck supple.   Neurological:      Mental Status: He is alert.   Psychiatric:         Mood and Affect: Mood normal.         Behavior: Behavior is cooperative.         Assessment/Plan   Problem List Items Addressed This Visit       Type 2 diabetes mellitus with hyperglycemia, without long-term current use of insulin (CMS/[facility])     Give hs snack monitor          Essential hypertension, benign - Primary     Continue current medication          OAB (overactive bladder)     Oxybutynin           Medications, treatments, and labs reviewed  Continue medications and treatments as listed in EMR    Scribe Attestation  IChristina Scribe   attest that this documentation has been prepared under the direction and in the presence of YESENIA Celestin.    Provider Attestation - Scribe documentation  All medical record entries made by the Scribe were at my direction and personally dictated by me. I have reviewed the chart and agree that the record accurately reflects my personal performance of the history, physical exam, discussion and plan.    YESENIA Celestin

## 2024-02-19 ENCOUNTER — NURSING HOME VISIT (OUTPATIENT)
Dept: POST ACUTE CARE | Facility: EXTERNAL LOCATION | Age: 74
End: 2024-02-19
Payer: MEDICARE
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
## 2024-02-19 PROCEDURE — 99308 SBSQ NF CARE LOW MDM 20: CPT | Performed by: REGISTERED NURSE

## 2024-02-19 NOTE — LETTER
Patient: Miguel Angel Santos  : 1950    Encounter Date: 2024    PROGRESS NOTE    Subjective  Chief complaint: Miguel Angel Santos is a 74 y.o. male who is a long term care patient being seen and evaluated for fall.    HPI:  HPI pt s/p fall denies pain   Objective  Vital signs: 146/88, 97.6, 90, 18, 106.0, 98%    Physical Exam  Constitutional:       General: He is not in acute distress.  Eyes:      Extraocular Movements: Extraocular movements intact.   Pulmonary:      Effort: Pulmonary effort is normal.   Musculoskeletal:      Cervical back: Neck supple.   Neurological:      Mental Status: He is alert.   Psychiatric:         Mood and Affect: Mood normal.         Behavior: Behavior is cooperative.         Assessment/Plan  Problem List Items Addressed This Visit       Fall - Primary     neurocheck          Medications, treatments, and labs reviewed  Continue medications and treatments as listed in [facility]    Scribe Attestation  IChristina Scribe   attest that this documentation has been prepared under the direction and in the presence of YESENIA Celestin.    Provider Attestation - Scribe documentation  All medical record entries made by the Scribe were at my direction and personally dictated by me. I have reviewed the chart and agree that the record accurately reflects my personal performance of the history, physical exam, discussion and plan.    YESENIA Celestin          Electronically Signed By: YESENIA Celestin   3/11/24  7:20 PM

## 2024-02-20 NOTE — PROGRESS NOTES
PROGRESS NOTE    Subjective   Chief complaint: Miguel Angel Santos is a 74 y.o. male who is a long term care patient being seen and evaluated for fall.    HPI:  HPI pt s/p fall denies pain   Objective   Vital signs: 146/88, 97.6, 90, 18, 106.0, 98%    Physical Exam  Constitutional:       General: He is not in acute distress.  Eyes:      Extraocular Movements: Extraocular movements intact.   Pulmonary:      Effort: Pulmonary effort is normal.   Musculoskeletal:      Cervical back: Neck supple.   Neurological:      Mental Status: He is alert.   Psychiatric:         Mood and Affect: Mood normal.         Behavior: Behavior is cooperative.         Assessment/Plan   Problem List Items Addressed This Visit       Fall - Primary     neurocheck          Medications, treatments, and labs reviewed  Continue medications and treatments as listed in [facility]    Scribe Attestation  I, Papito Acosta   attest that this documentation has been prepared under the direction and in the presence of YESENIA Celestin.    Provider Attestation - Scribe documentation  All medical record entries made by the Scribe were at my direction and personally dictated by me. I have reviewed the chart and agree that the record accurately reflects my personal performance of the history, physical exam, discussion and plan.    JOSUE CelestinCNP

## 2024-02-28 ENCOUNTER — NURSING HOME VISIT (OUTPATIENT)
Dept: POST ACUTE CARE | Facility: EXTERNAL LOCATION | Age: 74
End: 2024-02-28
Payer: MEDICARE

## 2024-02-28 DIAGNOSIS — I10 ESSENTIAL HYPERTENSION, BENIGN: Primary | ICD-10-CM

## 2024-02-28 DIAGNOSIS — E11.3592: Chronic | ICD-10-CM

## 2024-02-28 DIAGNOSIS — N32.81 OAB (OVERACTIVE BLADDER): ICD-10-CM

## 2024-02-28 PROCEDURE — 99309 SBSQ NF CARE MODERATE MDM 30: CPT | Performed by: REGISTERED NURSE

## 2024-02-29 NOTE — PROGRESS NOTES
PROGRESS NOTE    Subjective   Chief complaint: Miguel Angel Santos is a 74 y.o. male patient being seen and evaluated for monthly general medical care and follow-up    HPI:  2/28/24 Patient presents for general medical care and f/u.  Patient seen and examined at bedside.  No issues per nursing.  Patient has no acute complaints.          Objective   Vital signs: 146/88, 97.6, 90, 18, 210.0, 98%    Physical Exam  Constitutional:       General: He is not in acute distress.  Eyes:      Extraocular Movements: Extraocular movements intact.   Pulmonary:      Effort: Pulmonary effort is normal.   Musculoskeletal:      Cervical back: Neck supple.   Neurological:      Mental Status: He is alert.   Psychiatric:         Mood and Affect: Mood normal.         Behavior: Behavior is cooperative.         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    Provider Attestation - Scribe documentation  All medical record entries made by the Scribe were at my direction and personally dictated by me. I have reviewed the chart and agree that the record accurately reflects my personal performance of the history, physical exam, discussion and plan.    YESENIA Celestin

## 2024-03-05 ENCOUNTER — NURSING HOME VISIT (OUTPATIENT)
Dept: POST ACUTE CARE | Facility: EXTERNAL LOCATION | Age: 74
End: 2024-03-05
Payer: MEDICARE
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
## 2024-03-05 PROCEDURE — 99308 SBSQ NF CARE LOW MDM 20: CPT | Performed by: REGISTERED NURSE

## 2024-03-05 NOTE — LETTER
Patient: Miguel Angel Santos  : 1950    Encounter Date: 2024    PROGRESS NOTE    Subjective  Chief complaint: Miguel Angel Santos is a 74 y.o. male who is a long term care patient being seen and evaluated for fall.    HPI:  HPI status post fall denies pain  Objective  Vital signs: 146/88, 97.6, 90, 18, 177.0, 98%    Physical Exam  Constitutional:       General: He is not in acute distress.  Eyes:      Extraocular Movements: Extraocular movements intact.   Pulmonary:      Effort: Pulmonary effort is normal.   Musculoskeletal:      Cervical back: Neck supple.   Neurological:      Mental Status: He is alert.   Psychiatric:         Mood and Affect: Mood normal.         Behavior: Behavior is cooperative.         Assessment/Plan  Problem List Items Addressed This Visit       Fall - Primary     neurocheck          Medications, treatments, and labs reviewed  Continue medications and treatments as listed in [facility]    Scribe Attestation  IChristina Scribe   attest that this documentation has been prepared under the direction and in the presence of YESENIA Celestin.    Provider Attestation - Scribe documentation  All medical record entries made by the Scribe were at my direction and personally dictated by me. I have reviewed the chart and agree that the record accurately reflects my personal performance of the history, physical exam, discussion and plan.    YESENIA Celestin          Electronically Signed By: YESENIA Celestin   3/21/24 11:46 AM

## 2024-03-06 NOTE — PROGRESS NOTES
PROGRESS NOTE    Subjective   Chief complaint: Miguel Angel Santos is a 74 y.o. male who is a long term care patient being seen and evaluated for fall.    HPI:  HPI status post fall denies pain  Objective   Vital signs: 146/88, 97.6, 90, 18, 177.0, 98%    Physical Exam  Constitutional:       General: He is not in acute distress.  Eyes:      Extraocular Movements: Extraocular movements intact.   Pulmonary:      Effort: Pulmonary effort is normal.   Musculoskeletal:      Cervical back: Neck supple.   Neurological:      Mental Status: He is alert.   Psychiatric:         Mood and Affect: Mood normal.         Behavior: Behavior is cooperative.         Assessment/Plan   Problem List Items Addressed This Visit       Fall - Primary     neurocheck          Medications, treatments, and labs reviewed  Continue medications and treatments as listed in [facility]    Scribe Attestation  IChristina Scribe   attest that this documentation has been prepared under the direction and in the presence of YEESNIA Celestin.    Provider Attestation - Scribe documentation  All medical record entries made by the Scribe were at my direction and personally dictated by me. I have reviewed the chart and agree that the record accurately reflects my personal performance of the history, physical exam, discussion and plan.    JOSUE CelestinCNP
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
## 2024-03-29 ENCOUNTER — NURSING HOME VISIT (OUTPATIENT)
Dept: POST ACUTE CARE | Facility: EXTERNAL LOCATION | Age: 74
End: 2024-03-29
Payer: MEDICARE

## 2024-03-29 DIAGNOSIS — E11.3553 TYPE 2 DIABETES MELLITUS WITH STABLE PROLIFERATIVE RETINOPATHY OF BOTH EYES, WITHOUT LONG-TERM CURRENT USE OF INSULIN (MULTI): ICD-10-CM

## 2024-03-29 DIAGNOSIS — I10 ESSENTIAL HYPERTENSION, BENIGN: Primary | ICD-10-CM

## 2024-03-29 PROCEDURE — 99309 SBSQ NF CARE MODERATE MDM 30: CPT | Performed by: REGISTERED NURSE

## 2024-03-29 NOTE — LETTER
Patient: Miguel Angel Santos  : 1950    Encounter Date: 2024    PROGRESS NOTE    Subjective  Chief complaint: Miguel Angel Santos is a 74 y.o. male patient being seen and evaluated for monthly general medical care and follow-up    HPI:  3/29/24 Patient presents for general medical care and f/u.  Patient seen and examined at bedside.  No issues per nursing.  Patient has no acute complaints.        Objective  Vital signs: 116/67, 97.6, 58, 18, 200.0, 98%    Physical Exam  Constitutional:       General: He is not in acute distress.  Eyes:      Extraocular Movements: Extraocular movements intact.   Pulmonary:      Effort: Pulmonary effort is normal.   Musculoskeletal:      Cervical back: Neck supple.   Neurological:      Mental Status: He is alert.   Psychiatric:         Mood and Affect: Mood normal.         Behavior: Behavior is cooperative.         Assessment/Plan  Problem List Items Addressed This Visit       Essential hypertension, benign - Primary     Metformin  Losartan  Chlorthalidone            Type 2 diabetes mellitus with ophthalmic complication, without long-term current use of insulin (Multi)     Stable monitor           Medications, treatments, and labs reviewed  Continue medications and treatments as listed in EMR    Scribe Attestation  IChristina Scribe   attest that this documentation has been prepared under the direction and in the presence of YESENIA Celestin.    Provider Attestation - Scribe documentation  All medical record entries made by the Scribe were at my direction and personally dictated by me. I have reviewed the chart and agree that the record accurately reflects my personal performance of the history, physical exam, discussion and plan.    YESENIA Celestin      Electronically Signed By: YESENIA Celestin   24 12:20 PM

## 2024-04-01 NOTE — PROGRESS NOTES
PROGRESS NOTE    Subjective   Chief complaint: Miguel Angel Santos is a 74 y.o. male patient being seen and evaluated for monthly general medical care and follow-up    HPI:  3/29/24 Patient presents for general medical care and f/u.  Patient seen and examined at bedside.  No issues per nursing.  Patient has no acute complaints.        Objective   Vital signs: 116/67, 97.6, 58, 18, 200.0, 98%    Physical Exam  Constitutional:       General: He is not in acute distress.  Eyes:      Extraocular Movements: Extraocular movements intact.   Pulmonary:      Effort: Pulmonary effort is normal.   Musculoskeletal:      Cervical back: Neck supple.   Neurological:      Mental Status: He is alert.   Psychiatric:         Mood and Affect: Mood normal.         Behavior: Behavior is cooperative.         Assessment/Plan   Problem List Items Addressed This Visit       Essential hypertension, benign - Primary     Metformin  Losartan  Chlorthalidone            Type 2 diabetes mellitus with ophthalmic complication, without long-term current use of insulin (Multi)     Stable monitor           Medications, treatments, and labs reviewed  Continue medications and treatments as listed in EMR    Scribe Attestation  IChristina Scribe   attest that this documentation has been prepared under the direction and in the presence of YESENIA Celestin.    Provider Attestation - Scribe documentation  All medical record entries made by the Scribe were at my direction and personally dictated by me. I have reviewed the chart and agree that the record accurately reflects my personal performance of the history, physical exam, discussion and plan.    YESENIA Celestin

## 2024-04-18 ENCOUNTER — NURSING HOME VISIT (OUTPATIENT)
Dept: POST ACUTE CARE | Facility: EXTERNAL LOCATION | Age: 74
End: 2024-04-18
Payer: MEDICARE

## 2024-04-18 DIAGNOSIS — I10 ESSENTIAL HYPERTENSION, BENIGN: Primary | ICD-10-CM

## 2024-04-18 DIAGNOSIS — E11.3553 TYPE 2 DIABETES MELLITUS WITH STABLE PROLIFERATIVE RETINOPATHY OF BOTH EYES, WITHOUT LONG-TERM CURRENT USE OF INSULIN (MULTI): ICD-10-CM

## 2024-04-18 DIAGNOSIS — E55.9 VITAMIN D DEFICIENCY: ICD-10-CM

## 2024-04-18 DIAGNOSIS — E78.2 MIXED HYPERLIPIDEMIA: ICD-10-CM

## 2024-04-18 PROCEDURE — 99308 SBSQ NF CARE LOW MDM 20: CPT

## 2024-04-18 NOTE — LETTER
Patient: Miguel Angel Santos  : 1950    Encounter Date: 2024    PROGRESS NOTE    Subjective  Chief complaint: Miguel Angel Santos is a 74 y.o. male who is a long term care patient being seen and evaluated for monthly medical follow up.    HPI:  HPI  Patient presents for general medical care and f/u.  Patient seen and examined at bedside.  No new complaints at this time. Denies n/v/f/c.      Objective  Vital signs: 98.7, 150/78, 88, 87%, .    Physical Exam  Vitals reviewed.   Constitutional:       General: He is not in acute distress.  Eyes:      Extraocular Movements: Extraocular movements intact.   Cardiovascular:      Rate and Rhythm: Normal rate and regular rhythm.   Pulmonary:      Effort: Pulmonary effort is normal.      Breath sounds: Normal breath sounds.   Abdominal:      General: Bowel sounds are normal.      Palpations: Abdomen is soft.   Musculoskeletal:      Cervical back: Neck supple.      Right lower leg: No edema.      Left lower leg: No edema.   Neurological:      Mental Status: He is alert.   Psychiatric:         Mood and Affect: Mood normal.         Behavior: Behavior is cooperative.         Assessment/Plan  Problem List Items Addressed This Visit       Essential hypertension, benign - Primary     Metformin  Losartan  Chlorthalidone         Type 2 diabetes mellitus with ophthalmic complication, without long-term current use of insulin (Multi)     Stable  A1c 6.5 (2024)  Januvia  Metformin  Glipizide  Continue to monitor blood glucose           Mixed hyperlipidemia     Atorvastatin  Continue therapy pt tolerating well          Vitamin D deficiency     Vitamin D 40 (24)  Cont Vit D3 supplementation   Continue to monitor           Medications, treatments, and labs reviewed  Continue medications and treatments as listed in EMR    YESENIA George      Electronically Signed By: YESENIA George   24 11:02 PM

## 2024-04-23 PROBLEM — E11.65 TYPE 2 DIABETES MELLITUS WITH HYPERGLYCEMIA, WITHOUT LONG-TERM CURRENT USE OF INSULIN (MULTI): Status: RESOLVED | Noted: 2023-10-02 | Resolved: 2024-04-23

## 2024-04-23 PROBLEM — E78.2 MIXED HYPERLIPIDEMIA: Status: ACTIVE | Noted: 2024-04-23

## 2024-04-23 PROBLEM — E55.9 VITAMIN D DEFICIENCY: Status: ACTIVE | Noted: 2024-04-23

## 2024-04-23 PROBLEM — E11.39 TYPE 2 DIABETES MELLITUS WITH OPHTHALMIC COMPLICATION, WITHOUT LONG-TERM CURRENT USE OF INSULIN (MULTI): Status: ACTIVE | Noted: 2024-02-01

## 2024-04-23 PROBLEM — E11.3592: Chronic | Status: RESOLVED | Noted: 2018-10-03 | Resolved: 2024-04-23

## 2024-04-24 NOTE — PROGRESS NOTES
PROGRESS NOTE    Subjective   Chief complaint: Miguel Angel Santos is a 74 y.o. male who is a long term care patient being seen and evaluated for monthly medical follow up.    HPI:  HPI  Patient presents for general medical care and f/u.  Patient seen and examined at bedside.  No new complaints at this time. Denies n/v/f/c.      Objective   Vital signs: 98.7, 150/78, 88, 87%, .    Physical Exam  Vitals reviewed.   Constitutional:       General: He is not in acute distress.  Eyes:      Extraocular Movements: Extraocular movements intact.   Cardiovascular:      Rate and Rhythm: Normal rate and regular rhythm.   Pulmonary:      Effort: Pulmonary effort is normal.      Breath sounds: Normal breath sounds.   Abdominal:      General: Bowel sounds are normal.      Palpations: Abdomen is soft.   Musculoskeletal:      Cervical back: Neck supple.      Right lower leg: No edema.      Left lower leg: No edema.   Neurological:      Mental Status: He is alert.   Psychiatric:         Mood and Affect: Mood normal.         Behavior: Behavior is cooperative.         Assessment/Plan   Problem List Items Addressed This Visit       Essential hypertension, benign - Primary     Metformin  Losartan  Chlorthalidone         Type 2 diabetes mellitus with ophthalmic complication, without long-term current use of insulin (Multi)     Stable  A1c 6.5 (1/12/2024)  Januvia  Metformin  Glipizide  Continue to monitor blood glucose           Mixed hyperlipidemia     Atorvastatin  Continue therapy pt tolerating well          Vitamin D deficiency     Vitamin D 40 (1/12/24)  Cont Vit D3 supplementation   Continue to monitor           Medications, treatments, and labs reviewed  Continue medications and treatments as listed in EMR    Arleth Cordova, APRN-CNP

## 2025-01-26 NOTE — PROGRESS NOTES
Patient presents to ED via triage reporting severe abd pain, vomiting multiple times since this AM. Was OK yesterday. Hx of gastritis/GERD   Physical Therapy    Physical Therapy Treatment    Patient Name: Miguel Angel Santos  MRN: [id number]  Today's Date: 10/4/2023  Time Calculation  Start Time: 1358  Stop Time: 1506  Time Calculation (min): 68 min       Assessment/Plan   PT Assessment  PT Assessment Results: Decreased strength, Decreased range of motion, Impaired balance, Decreased mobility, Decreased coordination, Decreased cognition  Rehab Prognosis: Good  Evaluation/Treatment Tolerance: Patient tolerated treatment well  Medical Staff Made Aware: Yes  End of Session Communication: Bedside nurse  End of Session Patient Position: Bed, 2 rail up, Alarm off, not on at start of session     PT Plan  Treatment/Interventions: Bed mobility, Transfer training, Gait training, Balance training, Neuromuscular re-education, Strengthening, Endurance training, Postural re-education, Therapeutic exercise, Therapeutic activity  PT Plan: Skilled PT  PT Frequency: 4 times per week  PT Discharge Recommendations: High intensity level of continued care  PT Recommended Transfer Status: Assist x2      General Visit Information:   PT  Visit  PT Received On: 10/04/23  General  Family/Caregiver Present: No  Patient Position Received: Bed, 3 rail up    Subjective   Precautions:  Precautions  Medical Precautions: Cardiac precautions  Vital Signs:  Vital Signs  BP: 104/58 (BP dropped to 87/54 when standing, pt asymptomatic. BP post treatment in supine at 83/50, pt asymptomatic, RN notified)  BP Location: Left arm  BP Method: Automatic  Patient Position: Sitting    Objective   Pain:     Cognition:  Cognition  Overall Cognitive Status: Impaired  Arousal/Alertness: Appropriate responses to stimuli  Orientation Level: Disoriented to place, Disoriented to situation  Following Commands: Follows one step commands with increased time  Attention: Exceptions to WFL  Postural Control:  Postural Control  Trunk Control: retropulsion    Activity Tolerance:  Activity Tolerance  Endurance: Tolerates  less than 10 min exercise with changes in vital signs  Treatments:  Balance/Neuromuscular Re-Education  Balance/Neuromuscular Re-Education Activity Performed: Yes  Balance/Neuromuscular Re-Education Activity 1: 2 min static standing with UE support and Min A x 2 trials    Bed Mobility  Bed Mobility: Yes  Bed Mobility 1  Bed Mobility 1: Supine to sitting  Level of Assistance 1: Moderate assistance  Bed Mobility Comments 1: constant cuing for B LE management and UE placement  Bed Mobility 2  Bed Mobility  2: Sitting to supine  Level of Assistance 2: Moderate verbal cues    Ambulation/Gait Training  Ambulation/Gait Training Performed: Yes  Ambulation/Gait Training 1  Surface 1: Level tile  Device 1: Rolling walker  Gait Support Devices: Gait belt  Assistance 1: Moderate assistance  Quality of Gait 1:  (decreased step and stride length, increased time in double stance)  Comments/Distance (ft) 1:  (consant cuing for FWW)  Ambulation/Gait Training 2  Surface 2: Level tile  Device 2: No device  Gait Support Devices: Gait belt  Assistance 2: Hand held assistance, Arm in arm assistance, Maximum assistance  Quality of Gait 2:  (decreased stride and step length)  Transfers  Transfer: Yes  Transfer 1  Transfer From 1: Sit to  Transfer to 1: Stand  Technique 1: Sit to stand, Stand to sit  Transfer Level of Assistance 1: Arm in arm assistance, Moderate assistance  Trials/Comments 1: cuing for hand placement and to extend B LE  Transfers 2  Transfer From 2: Commode-standard to  Transfer to 2: Chair with arms  Technique 2: Squat pivot  Transfer Device 2: Gait belt  Transfer Level of Assistance 2: Moderate assistance  Transfers 3  Transfer From 3: Chair with arms to  Transfer to 3: Bed  Technique 3: Stand pivot  Transfer Device 3: Gait belt  Transfer Level of Assistance 3: Moderate assistance  Trials/Comments 3: constant cues for hand placement and hip management    Outcome Measures:  [facility] Basic Mobility  Turning from your back to  your side while in a flat bed without using bedrails: A little  Moving from lying on your back to sitting on the side of a flat bed without using bedrails: A lot  Moving to and from bed to chair (including a wheelchair): A lot  Standing up from a chair using your arms (e.g. wheelchair or bedside chair): A lot  To walk in hospital room: A lot  Climbing 3-5 steps with railing: Total  Basic Mobility - Total Score: 12    Education Documentation  Mobility Training, taught by Zaina May PTA at 10/4/2023  3:46 PM.  Learner: Patient  Readiness: Acceptance  Method: Explanation, Demonstration  Response: Verbalizes Understanding, Needs Reinforcement    Education Comments  No comments found.        OP EDUCATION:       Encounter Problems       Encounter Problems (Active)       Balance       STG - Maintains dynamic standing balance with CGA upper extremity support (Progressing)       Start:  10/03/23    Expected End:  10/17/23               Mobility       STG - Patient will ambulate >50 ft with CGA and no assistive device  (Progressing)       Start:  10/03/23    Expected End:  10/17/23               Transfers       STG - Patient will perform bed mobility with the use of bedrails and Jami x1 (Progressing)       Start:  10/03/23    Expected End:  10/17/23            STG - Patient will transfer sit to and from stand with CGA with no assistive device (Progressing)       Start:  10/03/23    Expected End:  10/17/23